# Patient Record
Sex: FEMALE | Race: BLACK OR AFRICAN AMERICAN | Employment: OTHER | URBAN - METROPOLITAN AREA
[De-identification: names, ages, dates, MRNs, and addresses within clinical notes are randomized per-mention and may not be internally consistent; named-entity substitution may affect disease eponyms.]

---

## 2019-11-25 ENCOUNTER — ED HISTORICAL/CONVERTED ENCOUNTER (OUTPATIENT)
Dept: OTHER | Age: 69
End: 2019-11-25

## 2021-05-08 ENCOUNTER — APPOINTMENT (OUTPATIENT)
Dept: GENERAL RADIOLOGY | Age: 71
End: 2021-05-08
Attending: STUDENT IN AN ORGANIZED HEALTH CARE EDUCATION/TRAINING PROGRAM
Payer: COMMERCIAL

## 2021-05-08 ENCOUNTER — HOSPITAL ENCOUNTER (EMERGENCY)
Age: 71
Discharge: HOME | End: 2021-05-08
Attending: STUDENT IN AN ORGANIZED HEALTH CARE EDUCATION/TRAINING PROGRAM
Payer: COMMERCIAL

## 2021-05-08 VITALS
SYSTOLIC BLOOD PRESSURE: 144 MMHG | WEIGHT: 240 LBS | OXYGEN SATURATION: 99 % | BODY MASS INDEX: 39.99 KG/M2 | HEIGHT: 65 IN | DIASTOLIC BLOOD PRESSURE: 50 MMHG | HEART RATE: 59 BPM | TEMPERATURE: 98.5 F | RESPIRATION RATE: 16 BRPM

## 2021-05-08 DIAGNOSIS — U07.1 COVID-19: ICD-10-CM

## 2021-05-08 DIAGNOSIS — R06.02 SOB (SHORTNESS OF BREATH): Primary | ICD-10-CM

## 2021-05-08 LAB
ALBUMIN SERPL-MCNC: 3.2 G/DL (ref 3.5–5)
ALBUMIN/GLOB SERPL: 0.9 {RATIO} (ref 1.1–2.2)
ALP SERPL-CCNC: 101 U/L (ref 45–117)
ALT SERPL-CCNC: 40 U/L (ref 12–78)
ANION GAP SERPL CALC-SCNC: 2 MMOL/L (ref 5–15)
AST SERPL W P-5'-P-CCNC: 16 U/L (ref 15–37)
ATRIAL RATE: 52 BPM
BASOPHILS # BLD: 0 K/UL (ref 0–0.1)
BASOPHILS NFR BLD: 0 % (ref 0–1)
BILIRUB SERPL-MCNC: 0.5 MG/DL (ref 0.2–1)
BNP SERPL-MCNC: 208 PG/ML
BUN SERPL-MCNC: 19 MG/DL (ref 6–20)
BUN/CREAT SERPL: 30 (ref 12–20)
CA-I BLD-MCNC: 9.6 MG/DL (ref 8.5–10.1)
CALCULATED P AXIS, ECG09: 49 DEGREES
CALCULATED R AXIS, ECG10: -52 DEGREES
CALCULATED T AXIS, ECG11: 103 DEGREES
CHLORIDE SERPL-SCNC: 104 MMOL/L (ref 97–108)
CO2 SERPL-SCNC: 33 MMOL/L (ref 21–32)
CREAT SERPL-MCNC: 0.63 MG/DL (ref 0.55–1.02)
D DIMER PPP FEU-MCNC: <0.27 UG/ML(FEU)
DIAGNOSIS, 93000: NORMAL
DIFFERENTIAL METHOD BLD: ABNORMAL
EOSINOPHIL # BLD: 0.2 K/UL (ref 0–0.4)
EOSINOPHIL NFR BLD: 2 % (ref 0–7)
ERYTHROCYTE [DISTWIDTH] IN BLOOD BY AUTOMATED COUNT: 12.5 % (ref 11.5–14.5)
GLOBULIN SER CALC-MCNC: 3.5 G/DL (ref 2–4)
GLUCOSE SERPL-MCNC: 114 MG/DL (ref 65–100)
HCT VFR BLD AUTO: 43.7 % (ref 35–47)
HGB BLD-MCNC: 13.9 G/DL (ref 11.5–16)
IMM GRANULOCYTES # BLD AUTO: 0 K/UL (ref 0–0.04)
IMM GRANULOCYTES NFR BLD AUTO: 0 % (ref 0–0.5)
LACTATE SERPL-SCNC: 1 MMOL/L (ref 0.4–2)
LDH SERPL L TO P-CCNC: 169 U/L (ref 81–246)
LYMPHOCYTES # BLD: 4.2 K/UL (ref 0.8–3.5)
LYMPHOCYTES NFR BLD: 48 % (ref 12–49)
MAGNESIUM SERPL-MCNC: 1.9 MG/DL (ref 1.6–2.4)
MCH RBC QN AUTO: 27.8 PG (ref 26–34)
MCHC RBC AUTO-ENTMCNC: 31.8 G/DL (ref 30–36.5)
MCV RBC AUTO: 87.4 FL (ref 80–99)
MONOCYTES # BLD: 1 K/UL (ref 0–1)
MONOCYTES NFR BLD: 12 % (ref 5–13)
NEUTS SEG # BLD: 3.2 K/UL (ref 1.8–8)
NEUTS SEG NFR BLD: 38 % (ref 32–75)
NRBC # BLD: 0 K/UL (ref 0–0.01)
NRBC BLD-RTO: 0 PER 100 WBC
P-R INTERVAL, ECG05: 170 MS
PLATELET # BLD AUTO: 276 K/UL (ref 150–400)
PMV BLD AUTO: 10.4 FL (ref 8.9–12.9)
POTASSIUM SERPL-SCNC: 3.4 MMOL/L (ref 3.5–5.1)
PROCALCITONIN SERPL-MCNC: <0.05 NG/ML
PROT SERPL-MCNC: 6.7 G/DL (ref 6.4–8.2)
Q-T INTERVAL, ECG07: 448 MS
QRS DURATION, ECG06: 118 MS
QTC CALCULATION (BEZET), ECG08: 416 MS
RBC # BLD AUTO: 5 M/UL (ref 3.8–5.2)
SODIUM SERPL-SCNC: 139 MMOL/L (ref 136–145)
TROPONIN I SERPL-MCNC: <0.05 NG/ML
VENTRICULAR RATE, ECG03: 52 BPM
WBC # BLD AUTO: 8.6 K/UL (ref 3.6–11)

## 2021-05-08 PROCEDURE — 83615 LACTATE (LD) (LDH) ENZYME: CPT

## 2021-05-08 PROCEDURE — 99285 EMERGENCY DEPT VISIT HI MDM: CPT

## 2021-05-08 PROCEDURE — 84484 ASSAY OF TROPONIN QUANT: CPT

## 2021-05-08 PROCEDURE — 71045 X-RAY EXAM CHEST 1 VIEW: CPT

## 2021-05-08 PROCEDURE — 83880 ASSAY OF NATRIURETIC PEPTIDE: CPT

## 2021-05-08 PROCEDURE — 82728 ASSAY OF FERRITIN: CPT

## 2021-05-08 PROCEDURE — 83605 ASSAY OF LACTIC ACID: CPT

## 2021-05-08 PROCEDURE — 80053 COMPREHEN METABOLIC PANEL: CPT

## 2021-05-08 PROCEDURE — 36415 COLL VENOUS BLD VENIPUNCTURE: CPT

## 2021-05-08 PROCEDURE — 93005 ELECTROCARDIOGRAM TRACING: CPT

## 2021-05-08 PROCEDURE — 74011250636 HC RX REV CODE- 250/636: Performed by: STUDENT IN AN ORGANIZED HEALTH CARE EDUCATION/TRAINING PROGRAM

## 2021-05-08 PROCEDURE — 85025 COMPLETE CBC W/AUTO DIFF WBC: CPT

## 2021-05-08 PROCEDURE — 84145 PROCALCITONIN (PCT): CPT

## 2021-05-08 PROCEDURE — 96374 THER/PROPH/DIAG INJ IV PUSH: CPT

## 2021-05-08 PROCEDURE — 83735 ASSAY OF MAGNESIUM: CPT

## 2021-05-08 PROCEDURE — 85379 FIBRIN DEGRADATION QUANT: CPT

## 2021-05-08 RX ORDER — DEXAMETHASONE SODIUM PHOSPHATE 10 MG/ML
10 INJECTION INTRAMUSCULAR; INTRAVENOUS ONCE
Status: COMPLETED | OUTPATIENT
Start: 2021-05-08 | End: 2021-05-08

## 2021-05-08 RX ORDER — ALBUTEROL SULFATE 90 UG/1
2 AEROSOL, METERED RESPIRATORY (INHALATION)
Qty: 1 INHALER | Refills: 1 | Status: SHIPPED | OUTPATIENT
Start: 2021-05-08 | End: 2021-10-11

## 2021-05-08 RX ORDER — ALBUTEROL SULFATE 90 UG/1
2 AEROSOL, METERED RESPIRATORY (INHALATION)
Qty: 1 INHALER | Refills: 1 | Status: SHIPPED | OUTPATIENT
Start: 2021-05-08 | End: 2021-05-08 | Stop reason: SDUPTHER

## 2021-05-08 RX ADMIN — DEXAMETHASONE SODIUM PHOSPHATE 10 MG: 10 INJECTION, SOLUTION INTRAMUSCULAR; INTRAVENOUS at 17:16

## 2021-05-08 NOTE — ED PROVIDER NOTES
EMERGENCY DEPARTMENT HISTORY AND PHYSICAL EXAM      Date: 5/8/2021  Patient Name: Nya Stern    History of Presenting Illness     Chief Complaint   Patient presents with    Shortness of Breath       History Provided By: Patient    HPI: Nya Stern, 66 y.o. female with a past medical history significant diabetes, hypertension and Recent Covid diagnosis presents to the ED with cc of shortness of breath. Patient was diagnosed with COVID-19 approximately 10 days ago, states she has been self isolating at home, has noticed worsening chest pressure, shortness of breath over the last 2 to 3 days. Patient was given prednisone to go home with, completed course, over the last 2 days noticed shortness of breath even worse. Denies any pains, denies any fevers chills, denies any abdominal pain, nausea vomiting. There are no other complaints, changes, or physical findings at this time. PCP: None    Current Outpatient Medications   Medication Sig Dispense Refill    albuterol (PROVENTIL HFA, VENTOLIN HFA, PROAIR HFA) 90 mcg/actuation inhaler Take 2 Puffs by inhalation every four (4) hours as needed for Wheezing. 1 Inhaler 1       Past History     Past Medical History:  Past Medical History:   Diagnosis Date    Diabetes ([facility] [address]     High cholesterol     Hypertension        Past Surgical History:  Past Surgical History:   Procedure Laterality Date    HX HEMORRHOIDECTOMY      HX TONSILLECTOMY      HX WISDOM TEETH EXTRACTION         Family History:  No family history on file. Social History:  Social History     Tobacco Use    Smoking status: Never Smoker   Substance Use Topics    Alcohol use: Never     Frequency: Never    Drug use: Never       Allergies: Allergies   Allergen Reactions    Doxycycline Unknown (comments)     HIVES    Metoprolol Unknown (comments)     HIVES         Review of Systems     Review of Systems   Constitutional: Negative for activity change, appetite change, chills and fever. HENT: Negative for ear pain, rhinorrhea and sore throat. Eyes: Negative for photophobia and visual disturbance. Respiratory: Positive for cough, chest tightness and shortness of breath. Cardiovascular: Negative for chest pain and palpitations. Gastrointestinal: Negative for abdominal pain and nausea. Genitourinary: Negative for dysuria and hematuria. Musculoskeletal: Negative for arthralgias and myalgias. Skin: Negative for color change and pallor. Neurological: Negative for dizziness, weakness, numbness and headaches. Psychiatric/Behavioral: Negative for confusion. Physical Exam     Physical Exam  Vitals signs and nursing note reviewed. Constitutional:       General: She is not in acute distress. Appearance: Normal appearance. She is normal weight. She is not ill-appearing. HENT:      Head: Normocephalic and atraumatic. Nose: Nose normal.      Mouth/Throat:      Mouth: Mucous membranes are moist.   Eyes:      Extraocular Movements: Extraocular movements intact. Pupils: Pupils are equal, round, and reactive to light. Neck:      Musculoskeletal: Normal range of motion and neck supple. No muscular tenderness. Cardiovascular:      Rate and Rhythm: Normal rate and regular rhythm. Pulses: Normal pulses. Pulmonary:      Effort: Tachypnea and accessory muscle usage present. Breath sounds: Normal breath sounds. No stridor. Chest:      Chest wall: No tenderness or edema. Abdominal:      General: Abdomen is flat. Bowel sounds are normal.      Palpations: Abdomen is soft. Tenderness: There is no abdominal tenderness. There is no guarding. Musculoskeletal: Normal range of motion. General: No tenderness. Skin:     General: Skin is warm and dry. Neurological:      General: No focal deficit present. Mental Status: She is alert and oriented to person, place, and time. Sensory: No sensory deficit. Motor: No weakness. Diagnostic Study Results     Labs -     Recent Results (from the past 12 hour(s))   CBC WITH AUTOMATED DIFF    Collection Time: 05/08/21  4:00 PM   Result Value Ref Range    WBC 8.6 3.6 - 11.0 K/uL    RBC 5.00 3.80 - 5.20 M/uL    HGB 13.9 11.5 - 16.0 g/dL    HCT 43.7 35.0 - 47.0 %    MCV 87.4 80.0 - 99.0 FL    MCH 27.8 26.0 - 34.0 PG    MCHC 31.8 30.0 - 36.5 g/dL    RDW 12.5 11.5 - 14.5 %    PLATELET 990 472 - 703 K/uL    MPV 10.4 8.9 - 12.9 FL    NRBC 0.0 0.0  WBC    ABSOLUTE NRBC 0.00 0.00 - 0.01 K/uL    NEUTROPHILS 38 32 - 75 %    LYMPHOCYTES 48 12 - 49 %    MONOCYTES 12 5 - 13 %    EOSINOPHILS 2 0 - 7 %    BASOPHILS 0 0 - 1 %    IMMATURE GRANULOCYTES 0 0 - 0.5 %    ABS. NEUTROPHILS 3.2 1.8 - 8.0 K/UL    ABS. LYMPHOCYTES 4.2 (H) 0.8 - 3.5 K/UL    ABS. MONOCYTES 1.0 0.0 - 1.0 K/UL    ABS. EOSINOPHILS 0.2 0.0 - 0.4 K/UL    ABS. BASOPHILS 0.0 0.0 - 0.1 K/UL    ABS. IMM. GRANS. 0.0 0.00 - 0.04 K/UL    DF AUTOMATED     METABOLIC PANEL, COMPREHENSIVE    Collection Time: 05/08/21  4:00 PM   Result Value Ref Range    Sodium 139 136 - 145 mmol/L    Potassium 3.4 (L) 3.5 - 5.1 mmol/L    Chloride 104 97 - 108 mmol/L    CO2 33 (H) 21 - 32 mmol/L    Anion gap 2 (L) 5 - 15 mmol/L    Glucose 114 (H) 65 - 100 mg/dL    BUN 19 6 - 20 mg/dL    Creatinine 0.63 0.55 - 1.02 mg/dL    BUN/Creatinine ratio 30 (H) 12 - 20      GFR est AA >60 >60 ml/min/1.73m2    GFR est non-AA >60 >60 ml/min/1.73m2    Calcium 9.6 8.5 - 10.1 mg/dL    Bilirubin, total 0.5 0.2 - 1.0 mg/dL    AST (SGOT) 16 15 - 37 U/L    ALT (SGPT) 40 12 - 78 U/L    Alk.  phosphatase 101 45 - 117 U/L    Protein, total 6.7 6.4 - 8.2 g/dL    Albumin 3.2 (L) 3.5 - 5.0 g/dL    Globulin 3.5 2.0 - 4.0 g/dL    A-G Ratio 0.9 (L) 1.1 - 2.2     TROPONIN I    Collection Time: 05/08/21  4:00 PM   Result Value Ref Range    Troponin-I, Qt. <0.05 <0.05 ng/mL   BNP    Collection Time: 05/08/21  4:00 PM   Result Value Ref Range    NT pro- (H) <125 pg/mL   MAGNESIUM Collection Time: 05/08/21  4:00 PM   Result Value Ref Range    Magnesium 1.9 1.6 - 2.4 mg/dL   LD    Collection Time: 05/08/21  4:00 PM   Result Value Ref Range     81 - 246 U/L   D DIMER    Collection Time: 05/08/21  4:00 PM   Result Value Ref Range    D DIMER <0.27 <0.50 ug/ml(FEU)   PROCALCITONIN    Collection Time: 05/08/21  4:00 PM   Result Value Ref Range    Procalcitonin <0.05 (H) 0 ng/mL   LACTIC ACID    Collection Time: 05/08/21  4:00 PM   Result Value Ref Range    Lactic acid 1.0 0.4 - 2.0 mmol/L   EKG, 12 LEAD, INITIAL    Collection Time: 05/08/21  5:22 PM   Result Value Ref Range    Ventricular Rate 52 BPM    Atrial Rate 52 BPM    P-R Interval 170 ms    QRS Duration 118 ms    Q-T Interval 448 ms    QTC Calculation (Bezet) 416 ms    Calculated P Axis 49 degrees    Calculated R Axis -52 degrees    Calculated T Axis 103 degrees    Diagnosis       Sinus bradycardia with Premature atrial complexes  Left anterior fascicular block  Left ventricular hypertrophy with QRS widening and repolarization abnormality  Abnormal ECG  No previous ECGs available  Confirmed by DOMENICO RAMOS ([id number]) on 5/8/2021 6:47:28 PM         Radiologic Studies -   [unfilled]  CT Results  (Last 48 hours)    None        CXR Results  (Last 48 hours)               05/08/21 1609  XR CHEST SNGL V Final result    Narrative:  Chest single view. A single view of the chest is obtained. Lung volumes are within normal limits. The peripheral lungs are clear. Cardiac and mediastinal structures are within   normal limits. There is no pneumothorax or pleural effusion. Medical Decision Making and ED Course   I am the first provider for this patient. I reviewed the vital signs, available nursing notes, past medical history, past surgical history, family history and social history. Vital Signs-Reviewed the patient's vital signs.   Patient Vitals for the past 12 hrs:   Temp Pulse Resp BP SpO2   05/08/21 1755  (!) 59 16 (!) 144/50 99 %   05/08/21 1736     100 %   05/08/21 1735  61 18 (!) 155/81 100 %   05/08/21 1554 98.5 °F (36.9 °C) 67 18 (!) 163/148 99 %       EKG interpretation: (Preliminary)  Sinus bradycardia 52, no ST elevations, T wave inversions in lateral leads    Records Reviewed: Nursing Notes    The patient presents with cough, shortness of breath with a differential diagnosis of COVID-19 pneumonia, pleural effusion, ACS, NSTEMI      Provider Notes (Medical Decision Making):     MDM   67-year-old female, history of diabetes, hypertension, presents to emergency department for 2 days of worsening shortness of breath, patient was diagnosed with COVID-19 approximately 10 days ago. Physical exam shows stable vitals,  patient tachypneic, complaining of significant shortness of breath however breath sounds are clear, saturations 99% on room air, clear oropharynx, no stridor no signs of obstruction. Patient placed on 2 L oxygen for symptomatic relief, states that symptoms much improved after oxygen. Chest x-ray obtained, resulted by radiologist no signs of acute infiltrate, effusion, no obvious airway obstruction noted,    Basic lab work drawn including cardiac enzymes, BNP, D-dimer    will administer 10 mg Decadron IV. ED Course:   Initial assessment performed. The patients presenting problems have been discussed, and they are in agreement with the care plan formulated and outlined with them. I have encouraged them to ask questions as they arise throughout their visit. ED Course as of May 08 2234   Sat May 08, 2021   1759 Patient's lab work reviewed, D-dimer negative, troponin negative, BMP within normal limits, chest x-ray shows no effusion infiltrate, patient continues to saturate 99 -100% on room air.     [PZ]   5774 Patient reassessed, comfortable appearing, discussed results with patient and daughter over the phone, will discharge patient home with prescription for albuterol pump, pulse ox reading, instructed on use, instructed to follow-up with primary care physician 1 to 2 days as needed, return precautions discussed. [PZ]      ED Course User Index  [PZ] Starr Yin MD         Procedures       Tate Drake MD  Procedures         Disposition       Discharged    Remove if not discharged  DISCHARGE PLAN:  1. There are no discharge medications for this patient. 2.   Follow-up Information     Follow up With Specialties Details Why Contact Info    Your primary care physician            3. Return to ED if worse     Diagnosis     Clinical Impression:   1. SOB (shortness of breath)    2. COVID-19        Attestations:    Tate Drake MD    Please note that this dictation was completed with [email or web address], the computer voice recognition software. Quite often unanticipated grammatical, syntax, homophones, and other interpretive errors are inadvertently transcribed by the computer software. Please disregard these errors. Please excuse any errors that have escaped final proofreading. Thank you.

## 2021-05-09 ENCOUNTER — PATIENT OUTREACH (OUTPATIENT)
Dept: CASE MANAGEMENT | Age: 71
End: 2021-05-09

## 2021-05-09 NOTE — PROGRESS NOTES
Patient contacted regarding TOGNS-11 diagnosis\". Discussed COVID-19 related testing which was not done at this time. Test results were not done. Patient informed of results, if available? N/a. Tested positive 11 days outside of health system. LPN Care Coordinator contacted the patient by telephone to perform post discharge assessment. Call within 2 business days of discharge: Yes Verified name and  with patient as identifiers. Provided introduction to self, and explanation of the CTN/ACM role, and reason for call due to risk factors for infection and/or exposure to COVID-19. Symptoms reviewed with patient who verbalized the following symptoms: no worsening symptoms      Due to no new or worsening symptoms encounter was not routed to provider for escalation. Discussed follow-up appointments. If no appointment was previously scheduled, appointment scheduling offered:  [facility] follow up appointment(s): No future appointments. Non-[facility] follow up appointment(s): Plans to follow-up with primary care provider. Advance Care Planning:   Does patient have an Advance Directive:  decision maker updated. Patient has following risk factors of: diabetes. LPN CC reviewed discharge instructions, medical action plan and red flags such as increased shortness of breath, increasing fever and signs of decompensation with patient who verbalized understanding. Discussed exposure protocols and quarantine with CDC Guidelines What to do if you are sick with coronavirus disease 2019.  Patient was given an opportunity for questions and concerns. The patient agrees to contact the [facility] exposure line [phone], [facility] [address]  ([phone] and PCP office for questions related to their healthcare. LPN CC provided contact information for future needs.     Reviewed and educated patient on any new and changed medications related to discharge diagnosis     Was patient discharged with a pulse oximeter? Pt states not discharged with pulse ox. Discussed and confirmed pulse oximeter discharge instructions and when to notify provider or seek emergency care. Plan for follow-up call in 5-7 days based on severity of symptoms and risk factors.

## 2021-05-09 NOTE — ACP (ADVANCE CARE PLANNING)
Advance Care Planning   Healthcare Decision Maker:     Carlos Betojama Marleni-daughter    Click here to complete [address] including selection of the Healthcare Decision Maker Relationship (ie \"Primary\")

## 2021-05-10 LAB — FERRITIN SERPL-MCNC: 66 NG/ML (ref 8–252)

## 2021-05-19 ENCOUNTER — PATIENT OUTREACH (OUTPATIENT)
Dept: CASE MANAGEMENT | Age: 71
End: 2021-05-19

## 2021-05-20 NOTE — PROGRESS NOTES
Patient contacted regarding COVID-19 diagnosis. LPN Care Coordinator contacted the patient by telephone to perform follow-up assessment. Verified name and  with patient as identifiers. Patient has following risk factors of: diabetes and HTN. Symptoms reviewed with patient who verbalized the following symptoms: no new symptoms. Due to no new or worsening symptoms encounter was not routed to provider for escalation. LPN CC provided contact information. Plan for follow-up call in 3-5 days based on severity of symptoms and risk factors.

## 2021-05-25 ENCOUNTER — PATIENT OUTREACH (OUTPATIENT)
Dept: CASE MANAGEMENT | Age: 71
End: 2021-05-25

## 2021-10-05 ENCOUNTER — HOSPITAL ENCOUNTER (INPATIENT)
Age: 71
LOS: 6 days | Discharge: HOME HEALTH | DRG: 308 | End: 2021-10-11
Attending: EMERGENCY MEDICINE | Admitting: FAMILY MEDICINE
Payer: MEDICARE

## 2021-10-05 ENCOUNTER — APPOINTMENT (OUTPATIENT)
Dept: CT IMAGING | Age: 71
DRG: 308 | End: 2021-10-05
Attending: FAMILY MEDICINE
Payer: MEDICARE

## 2021-10-05 ENCOUNTER — APPOINTMENT (OUTPATIENT)
Dept: CT IMAGING | Age: 71
DRG: 308 | End: 2021-10-05
Attending: EMERGENCY MEDICINE
Payer: MEDICARE

## 2021-10-05 ENCOUNTER — APPOINTMENT (OUTPATIENT)
Dept: GENERAL RADIOLOGY | Age: 71
DRG: 308 | End: 2021-10-05
Attending: EMERGENCY MEDICINE
Payer: MEDICARE

## 2021-10-05 DIAGNOSIS — R06.03 ACUTE RESPIRATORY DISTRESS: Primary | ICD-10-CM

## 2021-10-05 PROBLEM — R06.02 SOB (SHORTNESS OF BREATH): Status: ACTIVE | Noted: 2021-10-05

## 2021-10-05 PROBLEM — R06.00 DYSPNEA: Status: ACTIVE | Noted: 2021-10-05

## 2021-10-05 LAB
ALBUMIN SERPL-MCNC: 3.2 G/DL (ref 3.5–5)
ALBUMIN/GLOB SERPL: 1 {RATIO} (ref 1.1–2.2)
ALP SERPL-CCNC: 88 U/L (ref 45–117)
ALT SERPL-CCNC: 27 U/L (ref 12–78)
ANION GAP SERPL CALC-SCNC: 2 MMOL/L (ref 5–15)
APPEARANCE UR: CLEAR
AST SERPL W P-5'-P-CCNC: 18 U/L (ref 15–37)
ATRIAL RATE: 73 BPM
BACTERIA URNS QL MICRO: NEGATIVE /HPF
BASOPHILS # BLD: 0 K/UL (ref 0–0.1)
BASOPHILS NFR BLD: 1 % (ref 0–1)
BILIRUB SERPL-MCNC: 0.4 MG/DL (ref 0.2–1)
BILIRUB UR QL: NEGATIVE
BNP SERPL-MCNC: 89 PG/ML
BUN SERPL-MCNC: 12 MG/DL (ref 6–20)
BUN/CREAT SERPL: 30 (ref 12–20)
CA-I BLD-MCNC: 9.8 MG/DL (ref 8.5–10.1)
CALCULATED P AXIS, ECG09: 43 DEGREES
CALCULATED R AXIS, ECG10: -54 DEGREES
CALCULATED T AXIS, ECG11: 111 DEGREES
CHLORIDE SERPL-SCNC: 102 MMOL/L (ref 97–108)
CK SERPL-CCNC: 38.2 NG/ML (ref 26–192)
CO2 SERPL-SCNC: 37 MMOL/L (ref 21–32)
COLOR UR: NORMAL
COVID-19 RAPID TEST, COVR: NOT DETECTED
CREAT SERPL-MCNC: 0.4 MG/DL (ref 0.55–1.02)
DIAGNOSIS, 93000: NORMAL
DIFFERENTIAL METHOD BLD: ABNORMAL
EOSINOPHIL # BLD: 0 K/UL (ref 0–0.4)
EOSINOPHIL NFR BLD: 1 % (ref 0–7)
ERYTHROCYTE [DISTWIDTH] IN BLOOD BY AUTOMATED COUNT: 11.9 % (ref 11.5–14.5)
FLUAV AG NPH QL IA: NEGATIVE
FLUBV AG NOSE QL IA: NEGATIVE
GLOBULIN SER CALC-MCNC: 3.3 G/DL (ref 2–4)
GLUCOSE SERPL-MCNC: 101 MG/DL (ref 65–100)
GLUCOSE UR STRIP.AUTO-MCNC: NEGATIVE MG/DL
HCT VFR BLD AUTO: 39.1 % (ref 35–47)
HGB BLD-MCNC: 12.4 G/DL (ref 11.5–16)
HGB UR QL STRIP: NEGATIVE
IMM GRANULOCYTES # BLD AUTO: 0 K/UL (ref 0–0.04)
IMM GRANULOCYTES NFR BLD AUTO: 0 % (ref 0–0.5)
KETONES UR QL STRIP.AUTO: NEGATIVE MG/DL
LEUKOCYTE ESTERASE UR QL STRIP.AUTO: NEGATIVE
LYMPHOCYTES # BLD: 1.8 K/UL (ref 0.8–3.5)
LYMPHOCYTES NFR BLD: 42 % (ref 12–49)
MCH RBC QN AUTO: 28.6 PG (ref 26–34)
MCHC RBC AUTO-ENTMCNC: 31.7 G/DL (ref 30–36.5)
MCV RBC AUTO: 90.1 FL (ref 80–99)
MONOCYTES # BLD: 0.6 K/UL (ref 0–1)
MONOCYTES NFR BLD: 14 % (ref 5–13)
MUCOUS THREADS URNS QL MICRO: NORMAL /LPF
NEUTS SEG # BLD: 1.8 K/UL (ref 1.8–8)
NEUTS SEG NFR BLD: 42 % (ref 32–75)
NITRITE UR QL STRIP.AUTO: NEGATIVE
NRBC # BLD: 0 K/UL (ref 0–0.01)
NRBC BLD-RTO: 0 PER 100 WBC
P-R INTERVAL, ECG05: 198 MS
PH UR STRIP: 7 [PH] (ref 5–8)
PLATELET # BLD AUTO: 228 K/UL (ref 150–400)
PMV BLD AUTO: 10.5 FL (ref 8.9–12.9)
POTASSIUM SERPL-SCNC: 3.4 MMOL/L (ref 3.5–5.1)
PROT SERPL-MCNC: 6.5 G/DL (ref 6.4–8.2)
PROT UR STRIP-MCNC: NEGATIVE MG/DL
Q-T INTERVAL, ECG07: 414 MS
QRS DURATION, ECG06: 108 MS
QTC CALCULATION (BEZET), ECG08: 456 MS
RBC # BLD AUTO: 4.34 M/UL (ref 3.8–5.2)
RBC #/AREA URNS HPF: NORMAL /HPF (ref 0–5)
SODIUM SERPL-SCNC: 141 MMOL/L (ref 136–145)
SP GR UR REFRACTOMETRY: 1.01 (ref 1–1.03)
SPECIMEN SOURCE: NORMAL
TROPONIN I SERPL-MCNC: <0.05 NG/ML
TROPONIN I SERPL-MCNC: <0.05 NG/ML
UA: UC IF INDICATED,UAUC: NORMAL
UROBILINOGEN UR QL STRIP.AUTO: 0.1 EU/DL (ref 0.1–1)
VENTRICULAR RATE, ECG03: 73 BPM
WBC # BLD AUTO: 4.2 K/UL (ref 3.6–11)
WBC URNS QL MICRO: NORMAL /HPF (ref 0–4)

## 2021-10-05 PROCEDURE — 74011250637 HC RX REV CODE- 250/637: Performed by: FAMILY MEDICINE

## 2021-10-05 PROCEDURE — 87804 INFLUENZA ASSAY W/OPTIC: CPT

## 2021-10-05 PROCEDURE — 87040 BLOOD CULTURE FOR BACTERIA: CPT

## 2021-10-05 PROCEDURE — 99284 EMERGENCY DEPT VISIT MOD MDM: CPT

## 2021-10-05 PROCEDURE — 94760 N-INVAS EAR/PLS OXIMETRY 1: CPT

## 2021-10-05 PROCEDURE — 74011250637 HC RX REV CODE- 250/637: Performed by: EMERGENCY MEDICINE

## 2021-10-05 PROCEDURE — 71275 CT ANGIOGRAPHY CHEST: CPT

## 2021-10-05 PROCEDURE — 80053 COMPREHEN METABOLIC PANEL: CPT

## 2021-10-05 PROCEDURE — 74011250636 HC RX REV CODE- 250/636: Performed by: FAMILY MEDICINE

## 2021-10-05 PROCEDURE — 74011000250 HC RX REV CODE- 250: Performed by: FAMILY MEDICINE

## 2021-10-05 PROCEDURE — 94640 AIRWAY INHALATION TREATMENT: CPT

## 2021-10-05 PROCEDURE — 81001 URINALYSIS AUTO W/SCOPE: CPT

## 2021-10-05 PROCEDURE — 85025 COMPLETE CBC W/AUTO DIFF WBC: CPT

## 2021-10-05 PROCEDURE — 82550 ASSAY OF CK (CPK): CPT

## 2021-10-05 PROCEDURE — 65270000029 HC RM PRIVATE

## 2021-10-05 PROCEDURE — 87635 SARS-COV-2 COVID-19 AMP PRB: CPT

## 2021-10-05 PROCEDURE — 93005 ELECTROCARDIOGRAM TRACING: CPT

## 2021-10-05 PROCEDURE — 83880 ASSAY OF NATRIURETIC PEPTIDE: CPT

## 2021-10-05 PROCEDURE — 83036 HEMOGLOBIN GLYCOSYLATED A1C: CPT

## 2021-10-05 PROCEDURE — 84484 ASSAY OF TROPONIN QUANT: CPT

## 2021-10-05 PROCEDURE — 74011000636 HC RX REV CODE- 636: Performed by: FAMILY MEDICINE

## 2021-10-05 PROCEDURE — 71045 X-RAY EXAM CHEST 1 VIEW: CPT

## 2021-10-05 RX ORDER — POLYETHYLENE GLYCOL 3350 17 G/17G
17 POWDER, FOR SOLUTION ORAL DAILY PRN
Status: DISCONTINUED | OUTPATIENT
Start: 2021-10-05 | End: 2021-10-11 | Stop reason: HOSPADM

## 2021-10-05 RX ORDER — AMLODIPINE BESYLATE 10 MG/1
TABLET ORAL
COMMUNITY

## 2021-10-05 RX ORDER — GLIPIZIDE 5 MG/1
TABLET, FILM COATED, EXTENDED RELEASE ORAL
COMMUNITY

## 2021-10-05 RX ORDER — ACETAMINOPHEN 650 MG/1
650 SUPPOSITORY RECTAL
Status: DISCONTINUED | OUTPATIENT
Start: 2021-10-05 | End: 2021-10-11 | Stop reason: HOSPADM

## 2021-10-05 RX ORDER — ONDANSETRON 4 MG/1
4 TABLET, ORALLY DISINTEGRATING ORAL
Status: DISCONTINUED | OUTPATIENT
Start: 2021-10-05 | End: 2021-10-11 | Stop reason: HOSPADM

## 2021-10-05 RX ORDER — ASPIRIN 325 MG
325 TABLET ORAL
Status: COMPLETED | OUTPATIENT
Start: 2021-10-05 | End: 2021-10-05

## 2021-10-05 RX ORDER — IPRATROPIUM BROMIDE AND ALBUTEROL SULFATE 2.5; .5 MG/3ML; MG/3ML
3 SOLUTION RESPIRATORY (INHALATION)
Status: DISCONTINUED | OUTPATIENT
Start: 2021-10-05 | End: 2021-10-10

## 2021-10-05 RX ORDER — CHLORTHALIDONE 25 MG/1
25 TABLET ORAL DAILY
Status: DISCONTINUED | OUTPATIENT
Start: 2021-10-06 | End: 2021-10-11 | Stop reason: HOSPADM

## 2021-10-05 RX ORDER — LANOLIN ALCOHOL/MO/W.PET/CERES
CREAM (GRAM) TOPICAL
COMMUNITY

## 2021-10-05 RX ORDER — AMLODIPINE BESYLATE 5 MG/1
10 TABLET ORAL DAILY
Status: DISCONTINUED | OUTPATIENT
Start: 2021-10-06 | End: 2021-10-11 | Stop reason: HOSPADM

## 2021-10-05 RX ORDER — CHLORTHALIDONE 25 MG/1
TABLET ORAL
COMMUNITY

## 2021-10-05 RX ORDER — ENOXAPARIN SODIUM 100 MG/ML
40 INJECTION SUBCUTANEOUS DAILY
Status: DISCONTINUED | OUTPATIENT
Start: 2021-10-06 | End: 2021-10-08

## 2021-10-05 RX ORDER — SODIUM CHLORIDE 9 MG/ML
25 INJECTION, SOLUTION INTRAVENOUS CONTINUOUS
Status: DISCONTINUED | OUTPATIENT
Start: 2021-10-05 | End: 2021-10-08

## 2021-10-05 RX ORDER — ONDANSETRON 2 MG/ML
4 INJECTION INTRAMUSCULAR; INTRAVENOUS
Status: DISCONTINUED | OUTPATIENT
Start: 2021-10-05 | End: 2021-10-11 | Stop reason: HOSPADM

## 2021-10-05 RX ORDER — OXYBUTYNIN CHLORIDE 5 MG/1
5 TABLET ORAL DAILY
Status: DISCONTINUED | OUTPATIENT
Start: 2021-10-06 | End: 2021-10-11 | Stop reason: HOSPADM

## 2021-10-05 RX ORDER — ALBUTEROL SULFATE 90 UG/1
AEROSOL, METERED RESPIRATORY (INHALATION)
COMMUNITY
End: 2021-10-11

## 2021-10-05 RX ORDER — OXYBUTYNIN CHLORIDE 5 MG/1
TABLET ORAL
COMMUNITY
Start: 2021-09-23

## 2021-10-05 RX ORDER — LISINOPRIL 40 MG/1
40 TABLET ORAL DAILY
Status: DISCONTINUED | OUTPATIENT
Start: 2021-10-06 | End: 2021-10-11 | Stop reason: HOSPADM

## 2021-10-05 RX ORDER — INSULIN LISPRO 100 [IU]/ML
INJECTION, SOLUTION INTRAVENOUS; SUBCUTANEOUS
Status: DISCONTINUED | OUTPATIENT
Start: 2021-10-06 | End: 2021-10-11 | Stop reason: HOSPADM

## 2021-10-05 RX ORDER — MAGNESIUM SULFATE 100 %
4 CRYSTALS MISCELLANEOUS AS NEEDED
Status: DISCONTINUED | OUTPATIENT
Start: 2021-10-05 | End: 2021-10-11 | Stop reason: HOSPADM

## 2021-10-05 RX ORDER — LANOLIN ALCOHOL/MO/W.PET/CERES
400 CREAM (GRAM) TOPICAL DAILY
Status: DISCONTINUED | OUTPATIENT
Start: 2021-10-06 | End: 2021-10-11 | Stop reason: HOSPADM

## 2021-10-05 RX ORDER — GABAPENTIN 100 MG/1
CAPSULE ORAL
COMMUNITY

## 2021-10-05 RX ORDER — PRAVASTATIN SODIUM 20 MG/1
80 TABLET ORAL DAILY
Status: DISCONTINUED | OUTPATIENT
Start: 2021-10-06 | End: 2021-10-11 | Stop reason: HOSPADM

## 2021-10-05 RX ORDER — GABAPENTIN 100 MG/1
100 CAPSULE ORAL
Status: DISCONTINUED | OUTPATIENT
Start: 2021-10-05 | End: 2021-10-11 | Stop reason: HOSPADM

## 2021-10-05 RX ORDER — FAMOTIDINE 20 MG/1
40 TABLET, FILM COATED ORAL 2 TIMES DAILY
Status: DISCONTINUED | OUTPATIENT
Start: 2021-10-05 | End: 2021-10-11 | Stop reason: HOSPADM

## 2021-10-05 RX ORDER — DEXTROSE 50 % IN WATER (D50W) INTRAVENOUS SYRINGE
25-50 AS NEEDED
Status: DISCONTINUED | OUTPATIENT
Start: 2021-10-05 | End: 2021-10-11 | Stop reason: HOSPADM

## 2021-10-05 RX ORDER — PRAVASTATIN SODIUM 80 MG/1
TABLET ORAL
COMMUNITY
Start: 2020-12-07

## 2021-10-05 RX ORDER — ACETAMINOPHEN 325 MG/1
650 TABLET ORAL
Status: DISCONTINUED | OUTPATIENT
Start: 2021-10-05 | End: 2021-10-11 | Stop reason: HOSPADM

## 2021-10-05 RX ORDER — RAMIPRIL 10 MG/1
CAPSULE ORAL
COMMUNITY
Start: 2021-08-14

## 2021-10-05 RX ORDER — GLIPIZIDE 5 MG/1
5 TABLET ORAL DAILY
Status: DISCONTINUED | OUTPATIENT
Start: 2021-10-06 | End: 2021-10-11 | Stop reason: HOSPADM

## 2021-10-05 RX ADMIN — SODIUM CHLORIDE 75 ML/HR: 9 INJECTION, SOLUTION INTRAVENOUS at 23:50

## 2021-10-05 RX ADMIN — IPRATROPIUM BROMIDE AND ALBUTEROL SULFATE 3 ML: .5; 2.5 SOLUTION RESPIRATORY (INHALATION) at 19:23

## 2021-10-05 RX ADMIN — FAMOTIDINE 40 MG: 20 TABLET ORAL at 20:09

## 2021-10-05 RX ADMIN — ASPIRIN 325 MG ORAL TABLET 325 MG: 325 PILL ORAL at 16:23

## 2021-10-05 RX ADMIN — GABAPENTIN 100 MG: 100 CAPSULE ORAL at 20:10

## 2021-10-05 RX ADMIN — APIXABAN 5 MG: 5 TABLET, FILM COATED ORAL at 20:10

## 2021-10-05 RX ADMIN — METHYLPREDNISOLONE SODIUM SUCCINATE 40 MG: 40 INJECTION, POWDER, FOR SOLUTION INTRAMUSCULAR; INTRAVENOUS at 18:03

## 2021-10-05 RX ADMIN — IOPAMIDOL 100 ML: 755 INJECTION, SOLUTION INTRAVENOUS at 19:00

## 2021-10-05 RX ADMIN — METHYLPREDNISOLONE SODIUM SUCCINATE 40 MG: 40 INJECTION, POWDER, FOR SOLUTION INTRAMUSCULAR; INTRAVENOUS at 23:53

## 2021-10-05 NOTE — H&P
History and Physical    NAME: Raman Garibay   :  1950   MRN:  [id number]     Date/Time:  10/5/2021 6:08 PM    Patient PCP: Aidee Reynolds MD  ______________________________________________________________________             Subjective:     CHIEF COMPLAINT:     Shortness of breath    HISTORY OF PRESENT ILLNESS:       Patient is a 66y.o. year old female past medical history of diabetes hypertension hyperlipidemia chronic A. fib came to emergency room because of shortness of breath and dyspnea according the patient patient have Covid 19 in April since then patient feeling weak tired seen in the ER 3 times but recently getting more worse came to the emergency room seen by the ER physician  And recommend the patient to be admitted for further evaluation treatment    Past Medical History:   Diagnosis Date    Diabetes ([address]     High cholesterol     Hypertension         Past Surgical History:   Procedure Laterality Date    HX HEMORRHOIDECTOMY      HX TONSILLECTOMY      HX WISDOM TEETH EXTRACTION         Social History     Tobacco Use    Smoking status: Never Smoker   Substance Use Topics    Alcohol use: Never        No family history on file. Allergies   Allergen Reactions    Doxycycline Unknown (comments)     HIVES    Metoprolol Unknown (comments)     HIVES        Prior to Admission medications    Medication Sig Start Date End Date Taking? Authorizing Provider   apixaban (Eliquis) 5 mg tablet Eliquis 5 mg tablet   Take 1 tablet twice a day by oral route.    Yes Other, MD Vicenta   glipiZIDE SR (GLUCOTROL XL) 5 mg CR tablet glipizide ER 5 mg tablet, extended release 24 hr   Yes Other, MD Vicenta   amLODIPine (NORVASC) 10 mg tablet amlodipine 10 mg tablet   Yes Other, MD Vicenta   pravastatin (PRAVACHOL) 80 mg tablet pravastatin 80 mg tablet 20  Yes Other, MD Vicenta   ramipriL (ALTACE) 10 mg capsule  21  Yes Other, MD Vicenta   chlorthalidone (HYGROTON) 25 mg tablet chlorthalidone 25 mg tablet TAKE 1 TABLET BY MOUTH EVERY DAY   Yes Other, MD Vicenta   gabapentin (NEURONTIN) 100 mg capsule gabapentin 100 mg capsule   TAKE 1 CAPSULE AT BEDTIME THEN SLOWLY INCREASE TO 1 CAPSULE 3 TIMES A DAY   Yes Miky, MD Vicenta   magnesium oxide (MAG-OX) 400 mg tablet magnesium oxide 400 mg (241.3 mg magnesium) tablet   Take 1 tablet every day by oral route. Yes Miky, MD Vicenta   oxybutynin (DITROPAN) 5 mg tablet 1 TABLET AT BEDTIME ORALLY ONCE A DAY 10 DAYS 9/23/21  Yes Other, MD Vicenta   albuterol (PROVENTIL HFA, VENTOLIN HFA, PROAIR HFA) 90 mcg/actuation inhaler albuterol sulfate HFA 90 mcg/actuation aerosol inhaler   Yes Other, MD Vicenta   albuterol (PROVENTIL HFA, VENTOLIN HFA, PROAIR HFA) 90 mcg/actuation inhaler Take 2 Puffs by inhalation every four (4) hours as needed for Wheezing.  5/8/21  Yes Pepe Benjamin NP         Current Facility-Administered Medications:     [START ON 10/6/2021] amLODIPine (NORVASC) tablet 10 mg, 10 mg, Oral, DAILY, Quiana Cerna MD    apixaban (ELIQUIS) tablet 5 mg, 5 mg, Oral, BID, Quiana Cerna MD Estell Edouard  [START ON 10/6/2021] chlorthalidone (HYGROTON) tablet 25 mg, 25 mg, Oral, DAILY, Quiana Cerna MD    gabapentin (NEURONTIN) capsule 100 mg, 100 mg, Oral, QHS, Beverly Cerna MD    [START ON 10/6/2021] glipiZIDE (GLUCOTROL) tablet 5 mg, 5 mg, Oral, DAILY, Quiana Cerna MD Estell Edouard  [START ON 10/6/2021] magnesium oxide (MAG-OX) tablet 400 mg, 400 mg, Oral, DAILY, Quiana Cerna MD Estell Edouard  [START ON 10/6/2021] oxybutynin (DITROPAN) tablet 5 mg, 5 mg, Oral, DAILY, Quiana Cerna MD Estell Edouard  [START ON 10/6/2021] pravastatin (PRAVACHOL) tablet 80 mg, 80 mg, Oral, DAILY, Beverly Cerna MD Estell Edouard  [START ON 10/6/2021] lisinopriL (PRINIVIL, ZESTRIL) tablet 40 mg, 40 mg, Oral, DAILY, Beverly Cerna MD    albuterol-ipratropium (DUO-NEB) 2.5 MG-0.5 MG/3 ML, 3 mL, Nebulization, Q6H RT, Beverly Cerna MD    methylPREDNISolone (PF) (SOLU-MEDROL) injection 40 mg, 40 mg, IntraVENous, Q6H, Neeru Cerna MD    Current Outpatient Medications:     apixaban (Eliquis) 5 mg tablet, Eliquis 5 mg tablet  Take 1 tablet twice a day by oral route., Disp: , Rfl:     glipiZIDE SR (GLUCOTROL XL) 5 mg CR tablet, glipizide ER 5 mg tablet, extended release 24 hr, Disp: , Rfl:     amLODIPine (NORVASC) 10 mg tablet, amlodipine 10 mg tablet, Disp: , Rfl:     pravastatin (PRAVACHOL) 80 mg tablet, pravastatin 80 mg tablet, Disp: , Rfl:     ramipriL (ALTACE) 10 mg capsule, , Disp: , Rfl:     chlorthalidone (HYGROTON) 25 mg tablet, chlorthalidone 25 mg tablet  TAKE 1 TABLET BY MOUTH EVERY DAY, Disp: , Rfl:     gabapentin (NEURONTIN) 100 mg capsule, gabapentin 100 mg capsule  TAKE 1 CAPSULE AT BEDTIME THEN SLOWLY INCREASE TO 1 CAPSULE 3 TIMES A DAY, Disp: , Rfl:     magnesium oxide (MAG-OX) 400 mg tablet, magnesium oxide 400 mg (241.3 mg magnesium) tablet  Take 1 tablet every day by oral route., Disp: , Rfl:     oxybutynin (DITROPAN) 5 mg tablet, 1 TABLET AT BEDTIME ORALLY ONCE A DAY 10 DAYS, Disp: , Rfl:     albuterol (PROVENTIL HFA, VENTOLIN HFA, PROAIR HFA) 90 mcg/actuation inhaler, albuterol sulfate HFA 90 mcg/actuation aerosol inhaler, Disp: , Rfl:     albuterol (PROVENTIL HFA, VENTOLIN HFA, PROAIR HFA) 90 mcg/actuation inhaler, Take 2 Puffs by inhalation every four (4) hours as needed for Wheezing., Disp: 1 Inhaler, Rfl: 1    LAB DATA REVIEWED:    Recent Results (from the past 24 hour(s))   EKG, 12 LEAD, INITIAL    Collection Time: 10/05/21  9:59 AM   Result Value Ref Range    Ventricular Rate 73 BPM    Atrial Rate 73 BPM    P-R Interval 198 ms    QRS Duration 108 ms    Q-T Interval 414 ms    QTC Calculation (Bezet) 456 ms    Calculated P Axis 43 degrees    Calculated R Axis -54 degrees    Calculated T Axis 111 degrees    Diagnosis       Normal sinus rhythm  Left anterior fascicular block  Left ventricular hypertrophy with repolarization abnormality  Cannot rule out Septal infarct , age undetermined  Possible Lateral infarct , age undetermined  Abnormal ECG  When compared with ECG of 08-MAY-2021 17:22,  Premature atrial complexes are no longer Present  Confirmed by Navid Tavares ([id number]) on 10/5/2021 5:16:08 PM     COVID-19 RAPID TEST    Collection Time: 10/05/21 10:10 AM   Result Value Ref Range    Specimen source Please find results under separate order      COVID-19 rapid test Not Detected Not Detected     INFLUENZA A & B AG (RAPID TEST)    Collection Time: 10/05/21 10:10 AM   Result Value Ref Range    Influenza A Antigen Negative Negative      Influenza B Antigen Negative Negative     URINALYSIS W/ REFLEX CULTURE    Collection Time: 10/05/21 11:25 AM    Specimen: Urine   Result Value Ref Range    Color Yellow/Straw      Appearance Clear Clear      Specific gravity 1.014 1.003 - 1.030      pH (UA) 7.0 5.0 - 8.0      Protein Negative Negative mg/dL    Glucose Negative Negative mg/dL    Ketone Negative Negative mg/dL    Bilirubin Negative Negative      Blood Negative Negative      Urobilinogen 0.1 0.1 - 1.0 EU/dL    Nitrites Negative Negative      Leukocyte Esterase Negative Negative      UA:UC IF INDICATED Culture not indicated by UA result Culture not indicated by UA result      WBC 0-4 0 - 4 /hpf    RBC 0-5 0 - 5 /hpf    Bacteria Negative Negative /hpf    Mucus Trace /lpf   CBC WITH AUTOMATED DIFF    Collection Time: 10/05/21 11:40 AM   Result Value Ref Range    WBC 4.2 3.6 - 11.0 K/uL    RBC 4.34 3.80 - 5.20 M/uL    HGB 12.4 11.5 - 16.0 g/dL    HCT 39.1 35.0 - 47.0 %    MCV 90.1 80.0 - 99.0 FL    MCH 28.6 26.0 - 34.0 PG    MCHC 31.7 30.0 - 36.5 g/dL    RDW 11.9 11.5 - 14.5 %    PLATELET 378 762 - 232 K/uL    MPV 10.5 8.9 - 12.9 FL    NRBC 0.0 0.0  WBC    ABSOLUTE NRBC 0.00 0.00 - 0.01 K/uL    NEUTROPHILS 42 32 - 75 %    LYMPHOCYTES 42 12 - 49 %    MONOCYTES 14 (H) 5 - 13 %    EOSINOPHILS 1 0 - 7 %    BASOPHILS 1 0 - 1 %    IMMATURE GRANULOCYTES 0 0 - 0.5 %    ABS.  NEUTROPHILS 1.8 1.8 - 8.0 K/UL    ABS. LYMPHOCYTES 1.8 0.8 - 3.5 K/UL    ABS. MONOCYTES 0.6 0.0 - 1.0 K/UL    ABS. EOSINOPHILS 0.0 0.0 - 0.4 K/UL    ABS. BASOPHILS 0.0 0.0 - 0.1 K/UL    ABS. IMM. GRANS. 0.0 0.00 - 0.04 K/UL    DF AUTOMATED     CK W/ REFLX CKMB    Collection Time: 10/05/21 12:45 PM   Result Value Ref Range    CK 38.2 26 - 692 ng/mL   METABOLIC PANEL, COMPREHENSIVE    Collection Time: 10/05/21 12:45 PM   Result Value Ref Range    Sodium 141 136 - 145 mmol/L    Potassium 3.4 (L) 3.5 - 5.1 mmol/L    Chloride 102 97 - 108 mmol/L    CO2 37 (H) 21 - 32 mmol/L    Anion gap 2 (L) 5 - 15 mmol/L    Glucose 101 (H) 65 - 100 mg/dL    BUN 12 6 - 20 mg/dL    Creatinine 0.40 (L) 0.55 - 1.02 mg/dL    BUN/Creatinine ratio 30 (H) 12 - 20      GFR est AA >60 >60 ml/min/1.73m2    GFR est non-AA >60 >60 ml/min/1.73m2    Calcium 9.8 8.5 - 10.1 mg/dL    Bilirubin, total 0.4 0.2 - 1.0 mg/dL    AST (SGOT) 18 15 - 37 U/L    ALT (SGPT) 27 12 - 78 U/L    Alk. phosphatase 88 45 - 117 U/L    Protein, total 6.5 6.4 - 8.2 g/dL    Albumin 3.2 (L) 3.5 - 5.0 g/dL    Globulin 3.3 2.0 - 4.0 g/dL    A-G Ratio 1.0 (L) 1.1 - 2.2     BNP    Collection Time: 10/05/21 12:45 PM   Result Value Ref Range    NT pro-BNP 89 <125 pg/mL   TROPONIN I    Collection Time: 10/05/21 12:45 PM   Result Value Ref Range    Troponin-I, Qt. <0.05 <0.05 ng/mL       XR Results (most recent):  Results from Hospital Encounter encounter on 10/05/21    XR CHEST PORT    Narrative  Chest single view. Comparison single view chest May 8, 2021. Hilar prominence, this may be exaggerated related to reduced lung volumes. Unable to exclude lymphadenopathy. No gross interstitial or alveolar pulmonary edema. No pneumothorax or sizable  pleural effusion.     Consider optimal inspiratory PA and lateral view of the chest or  CECT chest.         XR CHEST PORT   Final Result      CTA CHEST W OR W WO CONT    (Results Pending)   CTA CHEST W OR W WO CONT    (Results Pending)        Review of Systems:  Constitutional: Negative for chills and fever. HENT: Negative. Eyes: Negative. Respiratory: Shortness of breath cardiovascular: Negative. Gastrointestinal: Negative for abdominal pain and nausea. Skin: Negative. Neurological: Negative. Objective:   VITALS:    Visit Vitals  BP (!) 164/74 (BP 1 Location: Left upper arm, BP Patient Position: At rest)   Pulse 74   Temp 98.3 °F (36.8 °C)   Resp 20   Ht 5' 4.5\" (1.638 m)   Wt 107.5 kg (237 lb)   SpO2 100%   BMI 40.05 kg/m²       Physical Exam:   Constitutional: pt is oriented to person, place, and time. HENT:   Head: Normocephalic and atraumatic. Eyes: Pupils are equal, round, and reactive to light. EOM are normal.   Cardiovascular: Normal rate, regular rhythm and normal heart sounds. Pulmonary/Chest: Breath sounds normal. No wheezes. No rales. Exhibits no tenderness. Abdominal: Soft. Bowel sounds are normal. There is no abdominal tenderness. There is no rebound and no guarding. Musculoskeletal: Normal range of motion. Neurological: pt is alert and oriented to person, place, and time. Alert. Normal strength. No cranial nerve deficit or sensory deficit. Displays a negative Romberg sign. ASSESSMENT & PLAN:    Acute dyspnea  History of COVID-19  Type 2 diabetes  Hypertension  Hyperlipidemia  History of DEIDRE Brown      Current Facility-Administered Medications:     [START ON 10/6/2021] amLODIPine (NORVASC) tablet 10 mg, 10 mg, Oral, DAILY, Mohiuddin, Gretel Lundborg, MD    apixaban (ELIQUIS) tablet 5 mg, 5 mg, Oral, BID, Mohiuddin, Gretel Lundborg, MD  [facility]  [START ON 10/6/2021] chlorthalidone (HYGROTON) tablet 25 mg, 25 mg, Oral, DAILY, Beverly Cerna MD    gabapentin (NEURONTIN) capsule 100 mg, 100 mg, Oral, QHS, Beverly Cerna MD    [START ON 10/6/2021] glipiZIDE (GLUCOTROL) tablet 5 mg, 5 mg, Oral, DAILY, Beverly Cerna MD  [facility]  [START ON 10/6/2021] magnesium oxide (MAG-OX) tablet 400 mg, 400 mg, Oral, DAILY, Lieutenant Joce MD  [facility] [START ON 10/6/2021] oxybutynin (DITROPAN) tablet 5 mg, 5 mg, Oral, DAILY, Keith Cerna MD  [facility]  [START ON 10/6/2021] pravastatin (PRAVACHOL) tablet 80 mg, 80 mg, Oral, DAILY, Keith Cerna MD  [facility]  [START ON 10/6/2021] lisinopriL (PRINIVIL, ZESTRIL) tablet 40 mg, 40 mg, Oral, DAILY, Keith Cerna MD    albuterol-ipratropium (DUO-NEB) 2.5 MG-0.5 MG/3 ML, 3 mL, Nebulization, Q6H RT, Keith Cerna MD    methylPREDNISolone (PF) (SOLU-MEDROL) injection 40 mg, 40 mg, IntraVENous, Q6H, Beverly Cerna MD    Current Outpatient Medications:     apixaban (Eliquis) 5 mg tablet, Eliquis 5 mg tablet  Take 1 tablet twice a day by oral route., Disp: , Rfl:     glipiZIDE SR (GLUCOTROL XL) 5 mg CR tablet, glipizide ER 5 mg tablet, extended release 24 hr, Disp: , Rfl:     amLODIPine (NORVASC) 10 mg tablet, amlodipine 10 mg tablet, Disp: , Rfl:     pravastatin (PRAVACHOL) 80 mg tablet, pravastatin 80 mg tablet, Disp: , Rfl:     ramipriL (ALTACE) 10 mg capsule, , Disp: , Rfl:     chlorthalidone (HYGROTON) 25 mg tablet, chlorthalidone 25 mg tablet  TAKE 1 TABLET BY MOUTH EVERY DAY, Disp: , Rfl:     gabapentin (NEURONTIN) 100 mg capsule, gabapentin 100 mg capsule  TAKE 1 CAPSULE AT BEDTIME THEN SLOWLY INCREASE TO 1 CAPSULE 3 TIMES A DAY, Disp: , Rfl:     magnesium oxide (MAG-OX) 400 mg tablet, magnesium oxide 400 mg (241.3 mg magnesium) tablet  Take 1 tablet every day by oral route., Disp: , Rfl:     oxybutynin (DITROPAN) 5 mg tablet, 1 TABLET AT BEDTIME ORALLY ONCE A DAY 10 DAYS, Disp: , Rfl:     albuterol (PROVENTIL HFA, VENTOLIN HFA, PROAIR HFA) 90 mcg/actuation inhaler, albuterol sulfate HFA 90 mcg/actuation aerosol inhaler, Disp: , Rfl:     albuterol (PROVENTIL HFA, VENTOLIN HFA, PROAIR HFA) 90 mcg/actuation inhaler, Take 2 Puffs by inhalation every four (4) hours as needed for Wheezing., Disp: 1 Inhaler, Rfl: 1    Patient admitted to medical telemetry floor order CT of the chest start on nebulizer treatment and IV Solu-Medrol continue other home medication pulmonary consult   PT OT consult ________________________________________________________________________    Signed: Jyoti Berkowitz MD

## 2021-10-05 NOTE — ED TRIAGE NOTES
Patient complained of SOB for a few days. Seen by PCP and given an inhaler with no relief. Patient daughter called EMS for evaluation.  all vitals stable.

## 2021-10-05 NOTE — ACP (ADVANCE CARE PLANNING)
Advance Care Planning   Healthcare Decision Maker:       Primary Decision Maker: Sally Yepez [facility] - [phone]

## 2021-10-05 NOTE — Clinical Note
Status[de-identified] INPATIENT [101]   Type of Bed: Remote Telemetry [29]   Cardiac Monitoring Required?: No   Inpatient Hospitalization Certified Necessary for the Following Reasons: 3.  Patient receiving treatment that can only be provided in an inpatient setting (further clarification in H&P documentation)   Admitting Diagnosis: SOB (shortness of breath) [174355]   Admitting Physician: Mitch Redmond [[id number]]   Attending Physician: Mitch Redmond [[id number]]   Estimated Length of Stay: 2 Midnights   Discharge Plan[de-identified] Home with Office Follow-up

## 2021-10-05 NOTE — PROGRESS NOTES
10/5/21. PCP is STU Liang. D/C Plan is home with daughter Ilana Mcdowell @ [phone]) & daughter will transport home upon discharge. Pt uses cane/no home health.

## 2021-10-05 NOTE — PROGRESS NOTES
Reason for Admission:  Dyspnea                     RUR Score:  8%                   Plan for utilizing home health:  Uses cane/no home health. PCP: First and Last name:  Shanna Mcnulty MD     Name of Practice:    Are you a current patient: Yes/No: Yes   Approximate date of last visit: Seen a few weeks ago. Can you participate in a virtual visit with your PCP: Yes/call. Current Advanced Directive/Advance Care Plan: No Order      Healthcare Decision Maker:                Primary Decision Maker: Sally Yepez - Daughter - [phone]                  Transition of Care Plan:  D/C Plan is home with daughter & daughter will transport home upon discharge.

## 2021-10-06 ENCOUNTER — APPOINTMENT (OUTPATIENT)
Dept: NON INVASIVE DIAGNOSTICS | Age: 71
DRG: 308 | End: 2021-10-06
Attending: FAMILY MEDICINE
Payer: MEDICARE

## 2021-10-06 LAB
ALBUMIN SERPL-MCNC: 3.2 G/DL (ref 3.5–5)
ALBUMIN/GLOB SERPL: 0.9 {RATIO} (ref 1.1–2.2)
ALP SERPL-CCNC: 94 U/L (ref 45–117)
ALT SERPL-CCNC: 29 U/L (ref 12–78)
ANION GAP SERPL CALC-SCNC: 1 MMOL/L (ref 5–15)
AST SERPL W P-5'-P-CCNC: 16 U/L (ref 15–37)
BASOPHILS # BLD: 0 K/UL (ref 0–0.1)
BASOPHILS NFR BLD: 0 % (ref 0–1)
BILIRUB SERPL-MCNC: 0.3 MG/DL (ref 0.2–1)
BNP SERPL-MCNC: 248 PG/ML
BUN SERPL-MCNC: 12 MG/DL (ref 6–20)
BUN/CREAT SERPL: 28 (ref 12–20)
CA-I BLD-MCNC: 10 MG/DL (ref 8.5–10.1)
CHLORIDE SERPL-SCNC: 101 MMOL/L (ref 97–108)
CO2 SERPL-SCNC: 37 MMOL/L (ref 21–32)
CREAT SERPL-MCNC: 0.43 MG/DL (ref 0.55–1.02)
DIFFERENTIAL METHOD BLD: ABNORMAL
ECHO AO ROOT DIAM: 3.1 CM
ECHO AV PEAK GRADIENT: 4 MMHG
ECHO AV PEAK VELOCITY: 106 CM/S
ECHO EST RA PRESSURE: 3 MMHG
ECHO LA MAJOR AXIS: 2.6 CM
ECHO LA MINOR AXIS: 1.23 CM
ECHO LV E' SEPTAL VELOCITY: 5.01 CM/S
ECHO LV EDV A2C: 30.7 CM3
ECHO LV EJECTION FRACTION BIPLANE: 64.7 % (ref 55–100)
ECHO LV ESV A2C: 8.74 CM3
ECHO LV INTERNAL DIMENSION DIASTOLIC: 3.13 CM (ref 3.9–5.3)
ECHO LV INTERNAL DIMENSION SYSTOLIC: 2.06 CM
ECHO LV IVSD: 2.23 CM (ref 0.6–0.9)
ECHO LV MASS 2D: 198.3 G (ref 67–162)
ECHO LV MASS INDEX 2D: 94 G/M2 (ref 43–95)
ECHO LV POSTERIOR WALL DIASTOLIC: 1.09 CM (ref 0.6–0.9)
ECHO LVOT PEAK GRADIENT: 3 MMHG
ECHO LVOT PEAK VELOCITY: 90.7 CM/S
ECHO MV A VELOCITY: 151 CM/S
ECHO MV AREA PHT: 5.5 CM2
ECHO MV E DECELERATION TIME (DT): 136 MS
ECHO MV E VELOCITY: 89 CM/S
ECHO MV E/A RATIO: 0.59
ECHO MV E/E' SEPTAL: 17.76
ECHO MV PRESSURE HALF TIME (PHT): 40 MS
ECHO PV MAX VELOCITY: 116 CM/S
ECHO PV PEAK INSTANTANEOUS GRADIENT SYSTOLIC: 5 MMHG
ECHO RIGHT VENTRICULAR SYSTOLIC PRESSURE (RVSP): 35 MMHG
ECHO RV INTERNAL DIMENSION: 3.05 CM
ECHO TV MEAN GRADIENT: 32 MMHG
ECHO TV REGURGITANT MAX VELOCITY: 282 CM/S
EOSINOPHIL # BLD: 0 K/UL (ref 0–0.4)
EOSINOPHIL NFR BLD: 0 % (ref 0–7)
ERYTHROCYTE [DISTWIDTH] IN BLOOD BY AUTOMATED COUNT: 12 % (ref 11.5–14.5)
EST. AVERAGE GLUCOSE BLD GHB EST-MCNC: 128 MG/DL
GLOBULIN SER CALC-MCNC: 3.6 G/DL (ref 2–4)
GLUCOSE BLD STRIP.AUTO-MCNC: 161 MG/DL (ref 65–117)
GLUCOSE BLD STRIP.AUTO-MCNC: 176 MG/DL (ref 65–117)
GLUCOSE BLD STRIP.AUTO-MCNC: 194 MG/DL (ref 65–117)
GLUCOSE BLD STRIP.AUTO-MCNC: 211 MG/DL (ref 65–117)
GLUCOSE SERPL-MCNC: 207 MG/DL (ref 65–100)
HBA1C MFR BLD: 6.1 % (ref 4–5.6)
HCT VFR BLD AUTO: 40.4 % (ref 35–47)
HGB BLD-MCNC: 12.3 G/DL (ref 11.5–16)
IMM GRANULOCYTES # BLD AUTO: 0 K/UL (ref 0–0.04)
IMM GRANULOCYTES NFR BLD AUTO: 1 % (ref 0–0.5)
LYMPHOCYTES # BLD: 0.5 K/UL (ref 0.8–3.5)
LYMPHOCYTES NFR BLD: 11 % (ref 12–49)
MCH RBC QN AUTO: 27.8 PG (ref 26–34)
MCHC RBC AUTO-ENTMCNC: 30.4 G/DL (ref 30–36.5)
MCV RBC AUTO: 91.2 FL (ref 80–99)
MONOCYTES # BLD: 0 K/UL (ref 0–1)
MONOCYTES NFR BLD: 1 % (ref 5–13)
MV DEC SLOPE: 6480 MM/S2
MV DEC SLOPE: 6480 MM/S2
NEUTS SEG # BLD: 3.6 K/UL (ref 1.8–8)
NEUTS SEG NFR BLD: 87 % (ref 32–75)
NRBC # BLD: 0 K/UL (ref 0–0.01)
NRBC BLD-RTO: 0 PER 100 WBC
PERFORMED BY, TECHID: ABNORMAL
PLATELET # BLD AUTO: 203 K/UL (ref 150–400)
PMV BLD AUTO: 10.8 FL (ref 8.9–12.9)
POTASSIUM SERPL-SCNC: 3.8 MMOL/L (ref 3.5–5.1)
PROT SERPL-MCNC: 6.8 G/DL (ref 6.4–8.2)
RBC # BLD AUTO: 4.43 M/UL (ref 3.8–5.2)
SODIUM SERPL-SCNC: 139 MMOL/L (ref 136–145)
WBC # BLD AUTO: 4.1 K/UL (ref 3.6–11)

## 2021-10-06 PROCEDURE — 93306 TTE W/DOPPLER COMPLETE: CPT

## 2021-10-06 PROCEDURE — 74011250637 HC RX REV CODE- 250/637: Performed by: FAMILY MEDICINE

## 2021-10-06 PROCEDURE — 83880 ASSAY OF NATRIURETIC PEPTIDE: CPT

## 2021-10-06 PROCEDURE — 74011636637 HC RX REV CODE- 636/637: Performed by: FAMILY MEDICINE

## 2021-10-06 PROCEDURE — 94640 AIRWAY INHALATION TREATMENT: CPT

## 2021-10-06 PROCEDURE — 85025 COMPLETE CBC W/AUTO DIFF WBC: CPT

## 2021-10-06 PROCEDURE — 74011000250 HC RX REV CODE- 250: Performed by: FAMILY MEDICINE

## 2021-10-06 PROCEDURE — 74011250636 HC RX REV CODE- 250/636: Performed by: FAMILY MEDICINE

## 2021-10-06 PROCEDURE — 80053 COMPREHEN METABOLIC PANEL: CPT

## 2021-10-06 PROCEDURE — 36415 COLL VENOUS BLD VENIPUNCTURE: CPT

## 2021-10-06 PROCEDURE — 82962 GLUCOSE BLOOD TEST: CPT

## 2021-10-06 PROCEDURE — 77010033678 HC OXYGEN DAILY

## 2021-10-06 PROCEDURE — 65270000029 HC RM PRIVATE

## 2021-10-06 RX ADMIN — Medication 400 MG: at 09:41

## 2021-10-06 RX ADMIN — METHYLPREDNISOLONE SODIUM SUCCINATE 40 MG: 40 INJECTION, POWDER, FOR SOLUTION INTRAMUSCULAR; INTRAVENOUS at 12:24

## 2021-10-06 RX ADMIN — INSULIN LISPRO 4 UNITS: 100 INJECTION, SOLUTION INTRAVENOUS; SUBCUTANEOUS at 12:10

## 2021-10-06 RX ADMIN — FAMOTIDINE 40 MG: 20 TABLET ORAL at 09:40

## 2021-10-06 RX ADMIN — GLIPIZIDE 5 MG: 5 TABLET ORAL at 09:43

## 2021-10-06 RX ADMIN — ENOXAPARIN SODIUM 40 MG: 40 INJECTION SUBCUTANEOUS at 09:46

## 2021-10-06 RX ADMIN — IPRATROPIUM BROMIDE AND ALBUTEROL SULFATE 3 ML: .5; 2.5 SOLUTION RESPIRATORY (INHALATION) at 08:10

## 2021-10-06 RX ADMIN — CHLORTHALIDONE 25 MG: 25 TABLET ORAL at 09:44

## 2021-10-06 RX ADMIN — ACETAMINOPHEN 650 MG: 325 TABLET ORAL at 20:58

## 2021-10-06 RX ADMIN — GABAPENTIN 100 MG: 100 CAPSULE ORAL at 21:00

## 2021-10-06 RX ADMIN — LISINOPRIL 40 MG: 40 TABLET ORAL at 09:43

## 2021-10-06 RX ADMIN — IPRATROPIUM BROMIDE AND ALBUTEROL SULFATE 3 ML: .5; 2.5 SOLUTION RESPIRATORY (INHALATION) at 02:00

## 2021-10-06 RX ADMIN — APIXABAN 5 MG: 5 TABLET, FILM COATED ORAL at 09:41

## 2021-10-06 RX ADMIN — METHYLPREDNISOLONE SODIUM SUCCINATE 40 MG: 40 INJECTION, POWDER, FOR SOLUTION INTRAMUSCULAR; INTRAVENOUS at 05:57

## 2021-10-06 RX ADMIN — OXYBUTYNIN CHLORIDE 5 MG: 5 TABLET ORAL at 09:41

## 2021-10-06 RX ADMIN — PRAVASTATIN SODIUM 80 MG: 20 TABLET ORAL at 09:00

## 2021-10-06 RX ADMIN — IPRATROPIUM BROMIDE AND ALBUTEROL SULFATE 3 ML: .5; 2.5 SOLUTION RESPIRATORY (INHALATION) at 19:23

## 2021-10-06 RX ADMIN — APIXABAN 5 MG: 5 TABLET, FILM COATED ORAL at 20:58

## 2021-10-06 RX ADMIN — METHYLPREDNISOLONE SODIUM SUCCINATE 40 MG: 40 INJECTION, POWDER, FOR SOLUTION INTRAMUSCULAR; INTRAVENOUS at 17:22

## 2021-10-06 RX ADMIN — FAMOTIDINE 40 MG: 20 TABLET ORAL at 20:58

## 2021-10-06 RX ADMIN — INSULIN LISPRO 3 UNITS: 100 INJECTION, SOLUTION INTRAVENOUS; SUBCUTANEOUS at 16:40

## 2021-10-06 NOTE — CONSULTS
PULMONARY CONSULT  [facility]    Name: Mary Kay Raza MRN: [id number]   : 1950 Hospital: [address] [place] [address]   Date: 10/6/2021  Admission date: 10/5/2021 Hospital Day: 2       HPI:     Hospital Problems  Never Reviewed        Codes Class Noted POA    Dyspnea ICD-10-CM: R06.00  ICD-9-CM: 786.09  10/5/2021 Unknown        SOB (shortness of breath) ICD-10-CM: R06.02  ICD-9-CM: 786.05  10/5/2021 Unknown                   [x] High complexity decision making was performed  [x] See my orders for details      Subjective/Initial History:     I was asked by Waylon Caldera MD to see Mary Kay Raza  a 66 y.o.   female in consultation     Excerpts from admission 10/5/2021 or consult notes as follows:     57-year-old lady came in because of shortness of breath and dyspnea significant past medical history of chronic A. fib, diabetes mellitus hypertension hyperlipidemia.   She was diagnosed with COVID-19 pneumonia in April since then patient has not been feeling well she has multiple admissions and ER visits secondary to the above for shortness of breath and dyspnea according to the daughter she is not been feeling well so the past 2 days she is more short of breath so he came to the hospital got admitted and pulmonary consult was called    Allergies   Allergen Reactions    Doxycycline Unknown (comments)     HIVES    Metoprolol Unknown (comments)     HIVES        MAR reviewed and pertinent medications noted or modified as needed     Current Facility-Administered Medications   Medication    amLODIPine (NORVASC) tablet 10 mg    apixaban (ELIQUIS) tablet 5 mg    chlorthalidone (HYGROTON) tablet 25 mg    gabapentin (NEURONTIN) capsule 100 mg    glipiZIDE (GLUCOTROL) tablet 5 mg    magnesium oxide (MAG-OX) tablet 400 mg    oxybutynin (DITROPAN) tablet 5 mg    pravastatin (PRAVACHOL) tablet 80 mg    lisinopriL (PRINIVIL, ZESTRIL) tablet 40 mg    albuterol-ipratropium (DUO-NEB) 2.5 MG-0.5 MG/3 ML    methylPREDNISolone (PF) (SOLU-MEDROL) injection 40 mg    insulin lispro (HUMALOG) injection    glucose chewable tablet 16 g    dextrose (D50W) injection syrg 12.5-25 g    glucagon (GLUCAGEN) injection 1 mg    0.9% sodium chloride infusion    acetaminophen (TYLENOL) tablet 650 mg    Or    acetaminophen (TYLENOL) suppository 650 mg    polyethylene glycol (MIRALAX) packet 17 g    ondansetron (ZOFRAN ODT) tablet 4 mg    Or    ondansetron (ZOFRAN) injection 4 mg    enoxaparin (LOVENOX) injection 40 mg    famotidine (PEPCID) tablet 40 mg     Current Outpatient Medications   Medication Sig    apixaban (Eliquis) 5 mg tablet Eliquis 5 mg tablet   Take 1 tablet twice a day by oral route.  glipiZIDE SR (GLUCOTROL XL) 5 mg CR tablet glipizide ER 5 mg tablet, extended release 24 hr    amLODIPine (NORVASC) 10 mg tablet amlodipine 10 mg tablet    pravastatin (PRAVACHOL) 80 mg tablet pravastatin 80 mg tablet    ramipriL (ALTACE) 10 mg capsule     chlorthalidone (HYGROTON) 25 mg tablet chlorthalidone 25 mg tablet   TAKE 1 TABLET BY MOUTH EVERY DAY    gabapentin (NEURONTIN) 100 mg capsule gabapentin 100 mg capsule   TAKE 1 CAPSULE AT BEDTIME THEN SLOWLY INCREASE TO 1 CAPSULE 3 TIMES A DAY    magnesium oxide (MAG-OX) 400 mg tablet magnesium oxide 400 mg (241.3 mg magnesium) tablet   Take 1 tablet every day by oral route.  oxybutynin (DITROPAN) 5 mg tablet 1 TABLET AT BEDTIME ORALLY ONCE A DAY 10 DAYS    albuterol (PROVENTIL HFA, VENTOLIN HFA, PROAIR HFA) 90 mcg/actuation inhaler albuterol sulfate HFA 90 mcg/actuation aerosol inhaler    albuterol (PROVENTIL HFA, VENTOLIN HFA, PROAIR HFA) 90 mcg/actuation inhaler Take 2 Puffs by inhalation every four (4) hours as needed for Wheezing. Patient PCP: Vita Smith MD  PMH:  has a past medical history of Diabetes ([address] High cholesterol, and Hypertension.   PSH:   has a past surgical history that includes hx hemorrhoidectomy; hx tonsillectomy; and hx wisdom teeth extraction. FHX: family history is not on file. SHX:  reports that she has never smoked. She does not have any smokeless tobacco history on file. She reports that she does not drink alcohol and does not use drugs. ROS:    Review of Systems   Constitutional: Positive for malaise/fatigue. HENT: Negative. Eyes: Negative. Respiratory: Positive for shortness of breath. Cardiovascular: Negative. Gastrointestinal: Negative. Genitourinary: Negative. Musculoskeletal: Negative. Skin: Negative. Neurological: Negative. Psychiatric/Behavioral: Negative. Objective:     Vital Signs: Telemetry atrial fibrillation Intake/Output:   Visit Vitals  BP (!) 140/74   Pulse 89   Temp 97.8 °F (36.6 °C)   Resp 18   Ht 5' 4.5\" (1.638 m)   Wt 107.5 kg (237 lb)   SpO2 100%   BMI 40.05 kg/m²       Temp (24hrs), Av.8 °F (36.6 °C), Min:97.7 °F (36.5 °C), Max:97.8 °F (36.6 °C)        O2 Device: Nasal cannula O2 Flow Rate (L/min): 2 l/min       Wt Readings from Last 4 Encounters:   10/05/21 107.5 kg (237 lb)   21 108.9 kg (240 lb)        No intake or output data in the 24 hours ending 10/06/21 1023    Last shift:      No intake/output data recorded. Last 3 shifts: No intake/output data recorded. Physical Exam:     Physical Exam  Constitutional:       Appearance: She is obese. HENT:      Head: Normocephalic and atraumatic. Nose: Nose normal.      Mouth/Throat:      Mouth: Mucous membranes are moist.   Eyes:      Pupils: Pupils are equal, round, and reactive to light. Cardiovascular:      Rate and Rhythm: Normal rate and regular rhythm. Pulses: Normal pulses. Heart sounds: Normal heart sounds. Pulmonary:      Effort: Pulmonary effort is normal.   Abdominal:      General: Abdomen is flat. Bowel sounds are normal.      Palpations: Abdomen is soft. Musculoskeletal:         General: Normal range of motion. Cervical back: Neck supple.    Skin:     General: Skin is warm. Neurological:      General: No focal deficit present. Mental Status: She is alert. Psychiatric:         Mood and Affect: Mood normal.          Labs:    Recent Labs     10/06/21  0430 10/05/21  1140   WBC 4.1 4.2   HGB 12.3 12.4    228     Recent Labs     10/06/21  0430 10/05/21  1245    141   K 3.8 3.4*    102   CO2 37* 37*   * 101*   BUN 12 12   CREA 0.43* 0.40*   CA 10.0 9.8   ALB 3.2* 3.2*   ALT 29 27     No results for input(s): PH, PCO2, PO2, HCO3, FIO2 in the last 72 hours. Recent Labs     10/05/21  2020 10/05/21  1245   TROIQ <0.05 <0.05     No results found for: BNPP, BNP   No results found for: CULTNo results found for: TSH, TSHEXT    Imaging:    CXR Results  (Last 48 hours)               10/05/21 1050  XR CHEST PORT Final result    Narrative:  Chest single view. Comparison single view chest May 8, 2021. Hilar prominence, this may be exaggerated related to reduced lung volumes. Unable to exclude lymphadenopathy. No gross interstitial or alveolar pulmonary edema. No pneumothorax or sizable   pleural effusion. Consider optimal inspiratory PA and lateral view of the chest or    CECT chest.           Results from Hospital Encounter encounter on 10/05/21    XR CHEST PORT    Narrative  Chest single view. Comparison single view chest May 8, 2021. Hilar prominence, this may be exaggerated related to reduced lung volumes. Unable to exclude lymphadenopathy. No gross interstitial or alveolar pulmonary edema. No pneumothorax or sizable  pleural effusion. Consider optimal inspiratory PA and lateral view of the chest or  CECT chest.      Results from Hospital Encounter encounter on 05/08/21    XR CHEST SNGL V    Narrative  Chest single view. A single view of the chest is obtained. Lung volumes are within normal limits. The peripheral lungs are clear. Cardiac and mediastinal structures are within  normal limits.  There is no pneumothorax or pleural effusion. Results from [place] encounter on 10/05/21    CTA CHEST W OR W WO CONT    Narrative  Shortness of breath. Comparison chest x-ray 10/5/2021. Chest CTA Technique: Axial chest with IV contrast. Multiplanar chest  reformatting and chest MIPs. Dose reduction: All CT scans at this facility are performed using dose reduction  optimization techniques as appropriate to a performed exam including the  following: Automated exposure control, adjustments of the mA and/or kV according  to patient's size, or use of iterative reconstruction technique. FINDINGS: Mild cardiomegaly. No pericardial effusion. Thoracic aorta without  aneurysm or dissection. No large central pulmonary arterial filling defects. Main pulmonary artery is larger than the aorta. No mediastinal or hilar  adenopathy. Thoracic esophagus is collapsed. No pleural effusion. Bilateral  lower lung airspace disease. Included imaging of the upper abdomen demonstrates  dense material in the colon causing streak artifact. No axillary adenopathy. Included thyroid unremarkable. Degenerative changes about the bony structures. Impression  1. No large central pulmonary embolus. Main pulmonary artery is larger than the  aorta suggesting pulmonary arterial hypertension. 2. Cardiomegaly. 3. Bilateral lower lung atelectasis or infiltrates. IMPRESSION:   1. Dyspnea  2. Acute hypoxic respiratory failure  3. Personal history of COVID-19 pneumonia  4. Rule out obstructive sleep apnea  5. Chronic A. Fib  6. Hypertension hyperlipidemia  7. Pulmonary hypertension  8. Pt is requiring Drug therapy requiring intensive monitoring for toxicity  9. Pt is unstable, unpredictable needing inpatient monitoring; is acutely ill and at high risk of sudden decline and decompensation with severe consequenses and continued end organ dysfunction and failure  10. Prognosis guarded       RECOMMENDATIONS/PLAN:     1.  Her dyspnea is multifactorial secondary to underlying chronic A. fib pulmonary hypertension also possible sleep apnea and also she had history of COVID-19 pneumonia  2. CAT scan of the chest negative for pulmonary embolism atelectasis basal no lung mass  3. Will get cardiology consult 2D echo to see the right heart pressure  4. Agree with Solu-Medrol continue with Eliquis  5. Agree with Empiric IV antibiotics pending culture results   6. Follow culture results  7. Supplemental O2 to keep sats > 93%  8. Aspiration precautions  9. Labs to follow electrolytes, renal function and and blood counts  10. Glucose monitoring and SSI  11. Bronchial hygiene with respiratory therapy techniques, bronchodilators  12. DVT, SUP prophylaxis         This care involved high complexity medical decision making: I personally:  · Reviewed the flowsheet and previous days notes  · Reviewed and summarized records or history from previous days note or discussions with staff, family  · High Risk Drug therapy requiring intensive monitoring for toxicity: eg steroids, pressors, antibiotics  · Reviewed and/or ordered Clinical lab tests  · Reviewed images and/or ordered Radiology tests  · Reviewed the patients ECG / Telemetry  · Reviewed and/or adjusted NiPPV settings  · Called and arranged for Radiologic procedures or interventions  · performed or ordered Diagnostic endoscopies with identified risk factors.   · discussed my assessment/management with : Nursing, Hospitalist and Family for coordination of care          Faizan Murray MD

## 2021-10-06 NOTE — CONSULTS
CONSULTATION    REASON FOR CONSULT:  History of atrial fibrillation     REQUESTING PROVIDER:  Dr. Woodrow Abdullahi:    Chief Complaint   Patient presents with    Shortness of Breath         HISTORY OF PRESENT ILLNESS:  April Mercado is a 66y.o. year-old female with past medical history significant for hypertension, hyperlipidemia, and chronic atrial fibrillation who presented to ED for evaluation of atrial fibrillation. On examination patient is sitting upright in the bed. She states that since her COVID diagnosis in April she has experienced generalized weakness and dyspnea with exertion. She states that her atrial fibrillation has been well controlled. She denies palpitations or chest discomfort. EKG: NSR; heart rate 73. Troponin negative x 1, . Records from hospital admission course thus far reviewed. PAST MEDICAL HISTORY:    Past Medical History:   Diagnosis Date    Diabetes ([address]     High cholesterol     Hypertension        PAST SURGICAL HISTORY:   Past Surgical History:   Procedure Laterality Date    HX HEMORRHOIDECTOMY      HX TONSILLECTOMY      HX WISDOM TEETH EXTRACTION         ALLERGIES:    Allergies   Allergen Reactions    Doxycycline Unknown (comments)     HIVES    Metoprolol Unknown (comments)     HIVES       FAMILY HISTORY:  No family history on file. SOCIAL HISTORY:    Tobacco: no  Drugs: no  ETOH: no    HOME MEDICATIONS:    Prior to Admission Medications   Prescriptions Last Dose Informant Patient Reported? Taking? albuterol (PROVENTIL HFA, VENTOLIN HFA, PROAIR HFA) 90 mcg/actuation inhaler 10/5/2021 at Unknown time  No Yes   Sig: Take 2 Puffs by inhalation every four (4) hours as needed for Wheezing.    albuterol (PROVENTIL HFA, VENTOLIN HFA, PROAIR HFA) 90 mcg/actuation inhaler 10/5/2021 at Unknown time  Yes Yes   Sig: albuterol sulfate HFA 90 mcg/actuation aerosol inhaler   amLODIPine (NORVASC) 10 mg tablet 10/5/2021 at Unknown time  Yes Yes Sig: amlodipine 10 mg tablet   apixaban (Eliquis) 5 mg tablet 10/5/2021 at Unknown time  Yes Yes   Sig: Eliquis 5 mg tablet   Take 1 tablet twice a day by oral route. chlorthalidone (HYGROTON) 25 mg tablet 10/5/2021 at Unknown time  Yes Yes   Sig: chlorthalidone 25 mg tablet   TAKE 1 TABLET BY MOUTH EVERY DAY   gabapentin (NEURONTIN) 100 mg capsule 10/5/2021 at Unknown time  Yes Yes   Sig: gabapentin 100 mg capsule   TAKE 1 CAPSULE AT BEDTIME THEN SLOWLY INCREASE TO 1 CAPSULE 3 TIMES A DAY   glipiZIDE SR (GLUCOTROL XL) 5 mg CR tablet 10/5/2021 at Unknown time  Yes Yes   Sig: glipizide ER 5 mg tablet, extended release 24 hr   magnesium oxide (MAG-OX) 400 mg tablet 10/5/2021 at Unknown time  Yes Yes   Sig: magnesium oxide 400 mg (241.3 mg magnesium) tablet   Take 1 tablet every day by oral route. oxybutynin (DITROPAN) 5 mg tablet 10/5/2021 at Unknown time  Yes Yes   Si TABLET AT BEDTIME ORALLY ONCE A DAY 10 DAYS   pravastatin (PRAVACHOL) 80 mg tablet 10/5/2021 at Unknown time  Yes Yes   Sig: pravastatin 80 mg tablet   ramipriL (ALTACE) 10 mg capsule 10/5/2021 at Unknown time  Yes Yes      Facility-Administered Medications: None       REVIEW OF SYSTEMS:  Complete review of systems performed, pertinents noted above, all other systems are negative.     Patient Vitals for the past 24 hrs:   Temp Pulse Resp BP SpO2   10/06/21 1405 97.6 °F (36.4 °C) 95 18 135/68 99 %   10/06/21 1307  83 20 133/75 97 %   10/06/21 1120  (!) 102 25 (!) 162/82    10/06/21 1046  98 24 (!) 149/85 97 %   10/06/21 0900  95 24 (!) 160/81 99 %   10/06/21 0812     100 %   10/06/21 0700  87 19 (!) 147/73 99 %   10/06/21 0448  89 18 (!) 140/74 99 %   10/06/21 0217 97.8 °F (36.6 °C) 90 18 (!) 143/71 98 %   10/06/21 0127     99 %   10/05/21 2349 97.7 °F (36.5 °C) 94 22 (!) 147/70 98 %   10/05/21 2012    (!) 162/59    10/05/21 2011  96 28 (!) 162/61 99 %       PHYSICAL EXAMINATION:    General: Well nourished well developed, NAD, A&O  HEENT: Normocephalic, PERRL, no drainage  Neck: Supple, Trachea midline, No JVD  RESP: CTA bilaterally. + Symmetrical chest movement. No SOB or distress. On O2 per protocol. Cardiovascular: RRR no MRG  PVS: No rubor, cyanosis, no edema  ABD: flat, soft, NT, Normoactive BS  Derm: Warm/Dry/Intact with no lesions,   Neuro: A&O PPTS, No focal deficits  PSYCH: No anxiety or agitation    Recent labs results and imaging reviewed. Recent Results (from the past 24 hour(s))   TROPONIN I    Collection Time: 10/05/21  8:20 PM   Result Value Ref Range    Troponin-I, Qt. <0.05 <0.05 ng/mL   CULTURE, BLOOD    Collection Time: 10/05/21 11:38 PM    Specimen: Blood   Result Value Ref Range    Special Requests: No Special Requests      Culture result: No growth after 4 hours     HEMOGLOBIN A1C WITH EAG    Collection Time: 10/05/21 11:41 PM   Result Value Ref Range    Hemoglobin A1c 6.1 (H) 4.0 - 5.6 %    Est. average glucose 128 mg/dL   CULTURE, BLOOD    Collection Time: 10/05/21 11:41 PM    Specimen: Blood   Result Value Ref Range    Special Requests: No Special Requests      Culture result: No growth after 4 hours     METABOLIC PANEL, COMPREHENSIVE    Collection Time: 10/06/21  4:30 AM   Result Value Ref Range    Sodium 139 136 - 145 mmol/L    Potassium 3.8 3.5 - 5.1 mmol/L    Chloride 101 97 - 108 mmol/L    CO2 37 (H) 21 - 32 mmol/L    Anion gap 1 (L) 5 - 15 mmol/L    Glucose 207 (H) 65 - 100 mg/dL    BUN 12 6 - 20 mg/dL    Creatinine 0.43 (L) 0.55 - 1.02 mg/dL    BUN/Creatinine ratio 28 (H) 12 - 20      GFR est AA >60 >60 ml/min/1.73m2    GFR est non-AA >60 >60 ml/min/1.73m2    Calcium 10.0 8.5 - 10.1 mg/dL    Bilirubin, total 0.3 0.2 - 1.0 mg/dL    AST (SGOT) 16 15 - 37 U/L    ALT (SGPT) 29 12 - 78 U/L    Alk.  phosphatase 94 45 - 117 U/L    Protein, total 6.8 6.4 - 8.2 g/dL    Albumin 3.2 (L) 3.5 - 5.0 g/dL    Globulin 3.6 2.0 - 4.0 g/dL    A-G Ratio 0.9 (L) 1.1 - 2.2     CBC WITH AUTOMATED DIFF    Collection Time: 10/06/21  4:30 AM   Result Value Ref Range    WBC 4.1 3.6 - 11.0 K/uL    RBC 4.43 3.80 - 5.20 M/uL    HGB 12.3 11.5 - 16.0 g/dL    HCT 40.4 35.0 - 47.0 %    MCV 91.2 80.0 - 99.0 FL    MCH 27.8 26.0 - 34.0 PG    MCHC 30.4 30.0 - 36.5 g/dL    RDW 12.0 11.5 - 14.5 %    PLATELET 087 188 - 354 K/uL    MPV 10.8 8.9 - 12.9 FL    NRBC 0.0 0.0  WBC    ABSOLUTE NRBC 0.00 0.00 - 0.01 K/uL    NEUTROPHILS 87 (H) 32 - 75 %    LYMPHOCYTES 11 (L) 12 - 49 %    MONOCYTES 1 (L) 5 - 13 %    EOSINOPHILS 0 0 - 7 %    BASOPHILS 0 0 - 1 %    IMMATURE GRANULOCYTES 1 (H) 0 - 0.5 %    ABS. NEUTROPHILS 3.6 1.8 - 8.0 K/UL    ABS. LYMPHOCYTES 0.5 (L) 0.8 - 3.5 K/UL    ABS. MONOCYTES 0.0 0.0 - 1.0 K/UL    ABS. EOSINOPHILS 0.0 0.0 - 0.4 K/UL    ABS. BASOPHILS 0.0 0.0 - 0.1 K/UL    ABS. IMM. GRANS. 0.0 0.00 - 0.04 K/UL    DF AUTOMATED     GLUCOSE, POC    Collection Time: 10/06/21  8:32 AM   Result Value Ref Range    Glucose (POC) 161 (H) 65 - 117 mg/dL    Performed by Jim Blanca    BNP    Collection Time: 10/06/21 10:43 AM   Result Value Ref Range    NT pro- (H) <125 pg/mL   GLUCOSE, POC    Collection Time: 10/06/21 11:54 AM   Result Value Ref Range    Glucose (POC) 211 (H) 65 - 117 mg/dL    Performed by JAMIE LARA        XR Results (maximum last 3): Results from [place] encounter on 10/05/21    XR CHEST PORT      Results from [place] encounter on 05/08/21    XR CHEST SNGL V      CT Results (maximum last 3): Results from [place] encounter on 10/05/21    CTA CHEST W OR W WO CONT    Impression  1. No large central pulmonary embolus. Main pulmonary artery is larger than the  aorta suggesting pulmonary arterial hypertension. 2. Cardiomegaly. 3. Bilateral lower lung atelectasis or infiltrates.             Current Facility-Administered Medications:     amLODIPine (NORVASC) tablet 10 mg, 10 mg, Oral, DAILY, Beverly Cerna MD    apixaban (ELIQUIS) tablet 5 mg, 5 mg, Oral, BID, Reg Bliss MD, 5 mg at 10/06/21 0941    chlorthalidone (HYGROTON) tablet 25 mg, 25 mg, Oral, DAILY, Reg Bliss MD, 25 mg at 10/06/21 0944    gabapentin (NEURONTIN) capsule 100 mg, 100 mg, Oral, QHS, Beverly Cerna MD, 100 mg at 10/05/21 2010    glipiZIDE (GLUCOTROL) tablet 5 mg, 5 mg, Oral, DAILY, Reg Bliss MD, 5 mg at 10/06/21 0943    magnesium oxide (MAG-OX) tablet 400 mg, 400 mg, Oral, DAILY, Beverly Cerna MD, 400 mg at 10/06/21 0941    oxybutynin (DITROPAN) tablet 5 mg, 5 mg, Oral, DAILY, Reg Bliss MD, 5 mg at 10/06/21 0941    pravastatin (PRAVACHOL) tablet 80 mg, 80 mg, Oral, DAILY, Beverly Cerna MD, 80 mg at 10/06/21 0900    lisinopriL (PRINIVIL, ZESTRIL) tablet 40 mg, 40 mg, Oral, DAILY, Reg Bliss MD, 40 mg at 10/06/21 0943    albuterol-ipratropium (DUO-NEB) 2.5 MG-0.5 MG/3 ML, 3 mL, Nebulization, Q6H RT, Reg Bliss MD, 3 mL at 10/06/21 0810    methylPREDNISolone (PF) (SOLU-MEDROL) injection 40 mg, 40 mg, IntraVENous, Q6H, Beverly Cerna MD, 40 mg at 10/06/21 1224    insulin lispro (HUMALOG) injection, , SubCUTAneous, AC&HS, Reg Bliss MD, 4 Units at 10/06/21 1210    glucose chewable tablet 16 g, 4 Tablet, Oral, PRN, Purvi Cerna MD    dextrose (D50W) injection syrg 12.5-25 g, 25-50 mL, IntraVENous, PRN, Purvi Cerna MD    glucagon (GLUCAGEN) injection 1 mg, 1 mg, IntraMUSCular, PRN, Reg Bliss MD    0.9% sodium chloride infusion, 25 mL/hr, IntraVENous, CONTINUOUS, Beverly Cerna MD, Last Rate: 25 mL/hr at 10/06/21 1125, 25 mL/hr at 10/06/21 1125    acetaminophen (TYLENOL) tablet 650 mg, 650 mg, Oral, Q6H PRN **OR** acetaminophen (TYLENOL) suppository 650 mg, 650 mg, Rectal, Q6H PRN, Beverly Cerna MD    polyethylene glycol (MIRALAX) packet 17 g, 17 g, Oral, DAILY PRN, Beverly Cerna MD    ondansetron (ZOFRAN ODT) tablet 4 mg, 4 mg, Oral, Q8H PRN **OR** ondansetron (ZOFRAN) injection 4 mg, 4 mg, IntraVENous, Q6H PRN, Beverly Cerna MD    enoxaparin (LOVENOX) injection 40 mg, 40 mg, SubCUTAneous, DAILY, Beverly Cerna MD, 40 mg at 10/06/21 0946    famotidine (PEPCID) tablet 40 mg, 40 mg, Oral, BID, Beverly Cerna MD, 40 mg at 10/06/21 0940          Case discussed with collaborating physician Dr. Veverly Essex and our impression and recommendations are as follows:     1. Chronic atrial fibrillation:   - Rate is controlled. Patient is allergic to metoprolol. Will add Cardizem if rate is uncontrolled. Her CHADS2-VASc score is 4 ; continue Eliquis Continue telemetry monitoring. Keep serum potassium between 4-5 and serum magnesium > 2.   - Recommend outpatient appointment to establish care. 2. Hypertension:   - blood pressure is at goal.   - Continue to monitor. 3. Acute hypoxic respiratory failure:  - CTA negative for PE  - pulmonary on the case. Thank you for involving us in the care of this patient. Please do not hesitate to call if additional questions arise. If after hours please call [phone].      Krupa Encinas MD, Reji ANTWAN Noguera  Structural Heart Disease  Endovascular and Vascular Medicine  Interventional Cardiology  [facility] - [facility]  [phone]

## 2021-10-06 NOTE — PROGRESS NOTES
Primary Nurse Cailin Olmos LPN and Blanca López RN performed a dual skin assessment on Ms. Severiano Messer. Patient has no apparent skin issues report. Patient skin is warm, dry, and intact.

## 2021-10-06 NOTE — PROGRESS NOTES
General Daily Progress Note          Patient Name:   Jonathon Gomez       YOB: 1950       Age:  66 y.o. Admit Date: 10/5/2021      Subjective:     Patient is a 66y.o. year old female past medical history of diabetes hypertension hyperlipidemia chronic A. fib came to emergency room because of shortness of breath and dyspnea according the patient patient have Covid 19 in April since then patient feeling weak tired seen in the ER 3 times but recently getting more worse came to the emergency room seen by the ER physician    Patient alert awake denies any chest pain or shortness of breath  Shortness of breath on exertion    CT of the chest done shows    1. No large central pulmonary embolus. Main pulmonary artery is larger than the  aorta suggesting pulmonary arterial hypertension. 2. Cardiomegaly.   3. Bilateral lower lung atelectasis or infiltrates        Objective:     Visit Vitals  BP (!) 140/74   Pulse 89   Temp 97.8 °F (36.6 °C)   Resp 18   Ht 5' 4.5\" (1.638 m)   Wt 107.5 kg (237 lb)   SpO2 100%   BMI 40.05 kg/m²        Recent Results (from the past 24 hour(s))   COVID-19 RAPID TEST    Collection Time: 10/05/21 10:10 AM   Result Value Ref Range    Specimen source Please find results under separate order      COVID-19 rapid test Not Detected Not Detected     INFLUENZA A & B AG (RAPID TEST)    Collection Time: 10/05/21 10:10 AM   Result Value Ref Range    Influenza A Antigen Negative Negative      Influenza B Antigen Negative Negative     URINALYSIS W/ REFLEX CULTURE    Collection Time: 10/05/21 11:25 AM    Specimen: Urine   Result Value Ref Range    Color Yellow/Straw      Appearance Clear Clear      Specific gravity 1.014 1.003 - 1.030      pH (UA) 7.0 5.0 - 8.0      Protein Negative Negative mg/dL    Glucose Negative Negative mg/dL    Ketone Negative Negative mg/dL    Bilirubin Negative Negative      Blood Negative Negative      Urobilinogen 0.1 0.1 - 1.0 EU/dL    Nitrites Negative Negative Leukocyte Esterase Negative Negative      UA:UC IF INDICATED Culture not indicated by UA result Culture not indicated by UA result      WBC 0-4 0 - 4 /hpf    RBC 0-5 0 - 5 /hpf    Bacteria Negative Negative /hpf    Mucus Trace /lpf   CBC WITH AUTOMATED DIFF    Collection Time: 10/05/21 11:40 AM   Result Value Ref Range    WBC 4.2 3.6 - 11.0 K/uL    RBC 4.34 3.80 - 5.20 M/uL    HGB 12.4 11.5 - 16.0 g/dL    HCT 39.1 35.0 - 47.0 %    MCV 90.1 80.0 - 99.0 FL    MCH 28.6 26.0 - 34.0 PG    MCHC 31.7 30.0 - 36.5 g/dL    RDW 11.9 11.5 - 14.5 %    PLATELET 196 579 - 911 K/uL    MPV 10.5 8.9 - 12.9 FL    NRBC 0.0 0.0  WBC    ABSOLUTE NRBC 0.00 0.00 - 0.01 K/uL    NEUTROPHILS 42 32 - 75 %    LYMPHOCYTES 42 12 - 49 %    MONOCYTES 14 (H) 5 - 13 %    EOSINOPHILS 1 0 - 7 %    BASOPHILS 1 0 - 1 %    IMMATURE GRANULOCYTES 0 0 - 0.5 %    ABS. NEUTROPHILS 1.8 1.8 - 8.0 K/UL    ABS. LYMPHOCYTES 1.8 0.8 - 3.5 K/UL    ABS. MONOCYTES 0.6 0.0 - 1.0 K/UL    ABS. EOSINOPHILS 0.0 0.0 - 0.4 K/UL    ABS. BASOPHILS 0.0 0.0 - 0.1 K/UL    ABS. IMM. GRANS. 0.0 0.00 - 0.04 K/UL    DF AUTOMATED     CK W/ REFLX CKMB    Collection Time: 10/05/21 12:45 PM   Result Value Ref Range    CK 38.2 26 - 896 ng/mL   METABOLIC PANEL, COMPREHENSIVE    Collection Time: 10/05/21 12:45 PM   Result Value Ref Range    Sodium 141 136 - 145 mmol/L    Potassium 3.4 (L) 3.5 - 5.1 mmol/L    Chloride 102 97 - 108 mmol/L    CO2 37 (H) 21 - 32 mmol/L    Anion gap 2 (L) 5 - 15 mmol/L    Glucose 101 (H) 65 - 100 mg/dL    BUN 12 6 - 20 mg/dL    Creatinine 0.40 (L) 0.55 - 1.02 mg/dL    BUN/Creatinine ratio 30 (H) 12 - 20      GFR est AA >60 >60 ml/min/1.73m2    GFR est non-AA >60 >60 ml/min/1.73m2    Calcium 9.8 8.5 - 10.1 mg/dL    Bilirubin, total 0.4 0.2 - 1.0 mg/dL    AST (SGOT) 18 15 - 37 U/L    ALT (SGPT) 27 12 - 78 U/L    Alk.  phosphatase 88 45 - 117 U/L    Protein, total 6.5 6.4 - 8.2 g/dL    Albumin 3.2 (L) 3.5 - 5.0 g/dL    Globulin 3.3 2.0 - 4.0 g/dL    A-G Ratio 1.0 (L) 1.1 - 2.2     BNP    Collection Time: 10/05/21 12:45 PM   Result Value Ref Range    NT pro-BNP 89 <125 pg/mL   TROPONIN I    Collection Time: 10/05/21 12:45 PM   Result Value Ref Range    Troponin-I, Qt. <0.05 <0.05 ng/mL   TROPONIN I    Collection Time: 10/05/21  8:20 PM   Result Value Ref Range    Troponin-I, Qt. <0.05 <0.38 ng/mL   METABOLIC PANEL, COMPREHENSIVE    Collection Time: 10/06/21  4:30 AM   Result Value Ref Range    Sodium 139 136 - 145 mmol/L    Potassium 3.8 3.5 - 5.1 mmol/L    Chloride 101 97 - 108 mmol/L    CO2 37 (H) 21 - 32 mmol/L    Anion gap 1 (L) 5 - 15 mmol/L    Glucose 207 (H) 65 - 100 mg/dL    BUN 12 6 - 20 mg/dL    Creatinine 0.43 (L) 0.55 - 1.02 mg/dL    BUN/Creatinine ratio 28 (H) 12 - 20      GFR est AA >60 >60 ml/min/1.73m2    GFR est non-AA >60 >60 ml/min/1.73m2    Calcium 10.0 8.5 - 10.1 mg/dL    Bilirubin, total 0.3 0.2 - 1.0 mg/dL    AST (SGOT) 16 15 - 37 U/L    ALT (SGPT) 29 12 - 78 U/L    Alk. phosphatase 94 45 - 117 U/L    Protein, total 6.8 6.4 - 8.2 g/dL    Albumin 3.2 (L) 3.5 - 5.0 g/dL    Globulin 3.6 2.0 - 4.0 g/dL    A-G Ratio 0.9 (L) 1.1 - 2.2     CBC WITH AUTOMATED DIFF    Collection Time: 10/06/21  4:30 AM   Result Value Ref Range    WBC 4.1 3.6 - 11.0 K/uL    RBC 4.43 3.80 - 5.20 M/uL    HGB 12.3 11.5 - 16.0 g/dL    HCT 40.4 35.0 - 47.0 %    MCV 91.2 80.0 - 99.0 FL    MCH 27.8 26.0 - 34.0 PG    MCHC 30.4 30.0 - 36.5 g/dL    RDW 12.0 11.5 - 14.5 %    PLATELET 117 907 - 427 K/uL    MPV 10.8 8.9 - 12.9 FL    NRBC 0.0 0.0  WBC    ABSOLUTE NRBC 0.00 0.00 - 0.01 K/uL    NEUTROPHILS 87 (H) 32 - 75 %    LYMPHOCYTES 11 (L) 12 - 49 %    MONOCYTES 1 (L) 5 - 13 %    EOSINOPHILS 0 0 - 7 %    BASOPHILS 0 0 - 1 %    IMMATURE GRANULOCYTES 1 (H) 0 - 0.5 %    ABS. NEUTROPHILS 3.6 1.8 - 8.0 K/UL    ABS. LYMPHOCYTES 0.5 (L) 0.8 - 3.5 K/UL    ABS. MONOCYTES 0.0 0.0 - 1.0 K/UL    ABS. EOSINOPHILS 0.0 0.0 - 0.4 K/UL    ABS. BASOPHILS 0.0 0.0 - 0.1 K/UL    ABS. IMM. GRANS. 0.0 0.00 - 0.04 K/UL    DF AUTOMATED     GLUCOSE, POC    Collection Time: 10/06/21  8:32 AM   Result Value Ref Range    Glucose (POC) 161 (H) 65 - 117 mg/dL    Performed by Severino Villanueva      [unfilled]      Review of Systems    Constitutional: Negative for chills and fever. HENT: Negative. Eyes: Negative. Respiratory: Negative. Cardiovascular: Negative. Gastrointestinal: Negative for abdominal pain and nausea. Skin: Negative. Neurological: Negative. Physical Exam:      Constitutional: pt is oriented to person, place, and time. HENT:   Head: Normocephalic and atraumatic. Eyes: Pupils are equal, round, and reactive to light. EOM are normal.   Cardiovascular: Normal rate, regular rhythm and normal heart sounds. Pulmonary/Chest: Breath sounds normal. No wheezes. No rales. Exhibits no tenderness. Abdominal: Soft. Bowel sounds are normal. There is no abdominal tenderness. There is no rebound and no guarding. Musculoskeletal: Normal range of motion. Neurological: pt is alert and oriented to person, place, and time. CTA CHEST W OR W WO CONT   Final Result   1. No large central pulmonary embolus. Main pulmonary artery is larger than the   aorta suggesting pulmonary arterial hypertension. 2. Cardiomegaly. 3. Bilateral lower lung atelectasis or infiltrates.       XR CHEST PORT   Final Result           Recent Results (from the past 24 hour(s))   COVID-19 RAPID TEST    Collection Time: 10/05/21 10:10 AM   Result Value Ref Range    Specimen source Please find results under separate order      COVID-19 rapid test Not Detected Not Detected     INFLUENZA A & B AG (RAPID TEST)    Collection Time: 10/05/21 10:10 AM   Result Value Ref Range    Influenza A Antigen Negative Negative      Influenza B Antigen Negative Negative     URINALYSIS W/ REFLEX CULTURE    Collection Time: 10/05/21 11:25 AM    Specimen: Urine   Result Value Ref Range    Color Yellow/Straw      Appearance Clear Clear      Specific gravity 1.014 1.003 - 1.030      pH (UA) 7.0 5.0 - 8.0      Protein Negative Negative mg/dL    Glucose Negative Negative mg/dL    Ketone Negative Negative mg/dL    Bilirubin Negative Negative      Blood Negative Negative      Urobilinogen 0.1 0.1 - 1.0 EU/dL    Nitrites Negative Negative      Leukocyte Esterase Negative Negative      UA:UC IF INDICATED Culture not indicated by UA result Culture not indicated by UA result      WBC 0-4 0 - 4 /hpf    RBC 0-5 0 - 5 /hpf    Bacteria Negative Negative /hpf    Mucus Trace /lpf   CBC WITH AUTOMATED DIFF    Collection Time: 10/05/21 11:40 AM   Result Value Ref Range    WBC 4.2 3.6 - 11.0 K/uL    RBC 4.34 3.80 - 5.20 M/uL    HGB 12.4 11.5 - 16.0 g/dL    HCT 39.1 35.0 - 47.0 %    MCV 90.1 80.0 - 99.0 FL    MCH 28.6 26.0 - 34.0 PG    MCHC 31.7 30.0 - 36.5 g/dL    RDW 11.9 11.5 - 14.5 %    PLATELET 733 266 - 361 K/uL    MPV 10.5 8.9 - 12.9 FL    NRBC 0.0 0.0  WBC    ABSOLUTE NRBC 0.00 0.00 - 0.01 K/uL    NEUTROPHILS 42 32 - 75 %    LYMPHOCYTES 42 12 - 49 %    MONOCYTES 14 (H) 5 - 13 %    EOSINOPHILS 1 0 - 7 %    BASOPHILS 1 0 - 1 %    IMMATURE GRANULOCYTES 0 0 - 0.5 %    ABS. NEUTROPHILS 1.8 1.8 - 8.0 K/UL    ABS. LYMPHOCYTES 1.8 0.8 - 3.5 K/UL    ABS. MONOCYTES 0.6 0.0 - 1.0 K/UL    ABS. EOSINOPHILS 0.0 0.0 - 0.4 K/UL    ABS. BASOPHILS 0.0 0.0 - 0.1 K/UL    ABS. IMM.  GRANS. 0.0 0.00 - 0.04 K/UL    DF AUTOMATED     CK W/ REFLX CKMB    Collection Time: 10/05/21 12:45 PM   Result Value Ref Range    CK 38.2 26 - 615 ng/mL   METABOLIC PANEL, COMPREHENSIVE    Collection Time: 10/05/21 12:45 PM   Result Value Ref Range    Sodium 141 136 - 145 mmol/L    Potassium 3.4 (L) 3.5 - 5.1 mmol/L    Chloride 102 97 - 108 mmol/L    CO2 37 (H) 21 - 32 mmol/L    Anion gap 2 (L) 5 - 15 mmol/L    Glucose 101 (H) 65 - 100 mg/dL    BUN 12 6 - 20 mg/dL    Creatinine 0.40 (L) 0.55 - 1.02 mg/dL    BUN/Creatinine ratio 30 (H) 12 - 20      GFR est AA >60 >60 ml/min/1.73m2 GFR est non-AA >60 >60 ml/min/1.73m2    Calcium 9.8 8.5 - 10.1 mg/dL    Bilirubin, total 0.4 0.2 - 1.0 mg/dL    AST (SGOT) 18 15 - 37 U/L    ALT (SGPT) 27 12 - 78 U/L    Alk. phosphatase 88 45 - 117 U/L    Protein, total 6.5 6.4 - 8.2 g/dL    Albumin 3.2 (L) 3.5 - 5.0 g/dL    Globulin 3.3 2.0 - 4.0 g/dL    A-G Ratio 1.0 (L) 1.1 - 2.2     BNP    Collection Time: 10/05/21 12:45 PM   Result Value Ref Range    NT pro-BNP 89 <125 pg/mL   TROPONIN I    Collection Time: 10/05/21 12:45 PM   Result Value Ref Range    Troponin-I, Qt. <0.05 <0.05 ng/mL   TROPONIN I    Collection Time: 10/05/21  8:20 PM   Result Value Ref Range    Troponin-I, Qt. <0.05 <0.25 ng/mL   METABOLIC PANEL, COMPREHENSIVE    Collection Time: 10/06/21  4:30 AM   Result Value Ref Range    Sodium 139 136 - 145 mmol/L    Potassium 3.8 3.5 - 5.1 mmol/L    Chloride 101 97 - 108 mmol/L    CO2 37 (H) 21 - 32 mmol/L    Anion gap 1 (L) 5 - 15 mmol/L    Glucose 207 (H) 65 - 100 mg/dL    BUN 12 6 - 20 mg/dL    Creatinine 0.43 (L) 0.55 - 1.02 mg/dL    BUN/Creatinine ratio 28 (H) 12 - 20      GFR est AA >60 >60 ml/min/1.73m2    GFR est non-AA >60 >60 ml/min/1.73m2    Calcium 10.0 8.5 - 10.1 mg/dL    Bilirubin, total 0.3 0.2 - 1.0 mg/dL    AST (SGOT) 16 15 - 37 U/L    ALT (SGPT) 29 12 - 78 U/L    Alk.  phosphatase 94 45 - 117 U/L    Protein, total 6.8 6.4 - 8.2 g/dL    Albumin 3.2 (L) 3.5 - 5.0 g/dL    Globulin 3.6 2.0 - 4.0 g/dL    A-G Ratio 0.9 (L) 1.1 - 2.2     CBC WITH AUTOMATED DIFF    Collection Time: 10/06/21  4:30 AM   Result Value Ref Range    WBC 4.1 3.6 - 11.0 K/uL    RBC 4.43 3.80 - 5.20 M/uL    HGB 12.3 11.5 - 16.0 g/dL    HCT 40.4 35.0 - 47.0 %    MCV 91.2 80.0 - 99.0 FL    MCH 27.8 26.0 - 34.0 PG    MCHC 30.4 30.0 - 36.5 g/dL    RDW 12.0 11.5 - 14.5 %    PLATELET 708 684 - 161 K/uL    MPV 10.8 8.9 - 12.9 FL    NRBC 0.0 0.0  WBC    ABSOLUTE NRBC 0.00 0.00 - 0.01 K/uL    NEUTROPHILS 87 (H) 32 - 75 %    LYMPHOCYTES 11 (L) 12 - 49 %    MONOCYTES 1 (L) 5 - 13 %    EOSINOPHILS 0 0 - 7 %    BASOPHILS 0 0 - 1 %    IMMATURE GRANULOCYTES 1 (H) 0 - 0.5 %    ABS. NEUTROPHILS 3.6 1.8 - 8.0 K/UL    ABS. LYMPHOCYTES 0.5 (L) 0.8 - 3.5 K/UL    ABS. MONOCYTES 0.0 0.0 - 1.0 K/UL    ABS. EOSINOPHILS 0.0 0.0 - 0.4 K/UL    ABS. BASOPHILS 0.0 0.0 - 0.1 K/UL    ABS. IMM. GRANS. 0.0 0.00 - 0.04 K/UL    DF AUTOMATED     GLUCOSE, POC    Collection Time: 10/06/21  8:32 AM   Result Value Ref Range    Glucose (POC) 161 (H) 65 - 117 mg/dL    Performed by Caprice Barrera        Results     Procedure Component Value Units Date/Time    CULTURE, BLOOD #2 [[id number]] Collected: 10/05/21 2341    Order Status: Completed Specimen: Blood Updated: 10/06/21 0000    CULTURE, BLOOD #1 [[id number]] Collected: 10/05/21 2338    Order Status: Completed Specimen: Blood Updated: 10/06/21 0000    COVID-19 RAPID TEST [[id number]] Collected: 10/05/21 1010    Order Status: Completed Specimen: Nasopharyngeal Updated: 10/05/21 1051     Specimen source       Please find results under separate order           COVID-19 rapid test Not Detected        Comment: Rapid Abbott ID Now   Rapid NAAT:  The specimen is NEGATIVE for SARS-CoV-2, the novel coronavirus associated with COVID-19. Negative results should be treated as presumptive and, if inconsistent with clinical signs and symptoms or necessary for patient management, should be tested with an alternative molecular assay. Negative results do not preclude SARS-CoV-2 infection and should not be used as the sole basis for patient management decisions. This test has been authorized by the FDA under   an Emergency Use Authorization (EUA) for use by authorized laboratories.  Fact sheet for Healthcare Providers: ConventionUpdate.co.nz Fact sheet for Patients: ConventionUpdate.co.nz   Methodology: Isothermal Nucleic Acid Amplification         INFLUENZA A & B AG (RAPID TEST) [[id number]] Collected: 10/05/21 1010    Order Status: Completed Specimen: Nasopharyngeal from Nasal washing Updated: 10/05/21 1109     Influenza A Antigen Negative        Influenza B Antigen Negative              Labs:     Recent Labs     10/06/21  0430 10/05/21  1140   WBC 4.1 4.2   HGB 12.3 12.4   HCT 40.4 39.1    228     Recent Labs     10/06/21  0430 10/05/21  1245    141   K 3.8 3.4*    102   CO2 37* 37*   BUN 12 12   CREA 0.43* 0.40*   * 101*   CA 10.0 9.8     Recent Labs     10/06/21  0430 10/05/21  1245   ALT 29 27   AP 94 88   TBILI 0.3 0.4   TP 6.8 6.5   ALB 3.2* 3.2*   GLOB 3.6 3.3     No results for input(s): INR, PTP, APTT, INREXT in the last 72 hours. No results for input(s): FE, TIBC, PSAT, FERR in the last 72 hours. No results found for: FOL, RBCF   No results for input(s): PH, PCO2, PO2 in the last 72 hours. Recent Labs     10/05/21  2020 10/05/21  1245   TROIQ <0.05 <0.05     No results found for: CHOL, CHOLX, CHLST, CHOLV, HDL, HDLP, LDL, LDLC, DLDLP, TGLX, TRIGL, TRIGP, CHHD, CHHDX  Lab Results   Component Value Date/Time    Glucose (POC) 161 (H) 10/06/2021 08:32 AM     Lab Results   Component Value Date/Time    Color Yellow/Straw 10/05/2021 11:25 AM    Appearance Clear 10/05/2021 11:25 AM    Specific gravity 1.014 10/05/2021 11:25 AM    pH (UA) 7.0 10/05/2021 11:25 AM    Protein Negative 10/05/2021 11:25 AM    Glucose Negative 10/05/2021 11:25 AM    Ketone Negative 10/05/2021 11:25 AM    Bilirubin Negative 10/05/2021 11:25 AM    Urobilinogen 0.1 10/05/2021 11:25 AM    Nitrites Negative 10/05/2021 11:25 AM    Leukocyte Esterase Negative 10/05/2021 11:25 AM    Bacteria Negative 10/05/2021 11:25 AM    WBC 0-4 10/05/2021 11:25 AM    RBC 0-5 10/05/2021 11:25 AM         Assessment:     Acute dyspnea  History of COVID-19  Type 2 diabetes  Hypertension  Hyperlipidemia  History of A.  Fib  Pulmonary hypertension      Plan:     Decrease off IV fluid 25 cc/h  2D echo  PT OT consult  Pulmonary consult    Continue following medications        Current Facility-Administered Medications:     amLODIPine (NORVASC) tablet 10 mg, 10 mg, Oral, DAILY, Beverly Cerna MD    apixaban (ELIQUIS) tablet 5 mg, 5 mg, Oral, BID, Beverly Cerna MD, 5 mg at 10/06/21 0941    chlorthalidone (HYGROTON) tablet 25 mg, 25 mg, Oral, DAILY, Beverly Cerna MD, 25 mg at 10/06/21 0944    gabapentin (NEURONTIN) capsule 100 mg, 100 mg, Oral, QHS, Beverly Cerna MD, 100 mg at 10/05/21 2010    glipiZIDE (GLUCOTROL) tablet 5 mg, 5 mg, Oral, DAILY, Martin Bolivar MD, 5 mg at 10/06/21 0943    magnesium oxide (MAG-OX) tablet 400 mg, 400 mg, Oral, DAILY, Beverly Cerna MD, 400 mg at 10/06/21 0941    oxybutynin (DITROPAN) tablet 5 mg, 5 mg, Oral, DAILY, Beverly Cerna MD, 5 mg at 10/06/21 0941    pravastatin (PRAVACHOL) tablet 80 mg, 80 mg, Oral, DAILY, Beverly Cerna MD, 80 mg at 10/06/21 0900    lisinopriL (PRINIVIL, ZESTRIL) tablet 40 mg, 40 mg, Oral, DAILY, Martin Bolivar MD, 40 mg at 10/06/21 0943    albuterol-ipratropium (DUO-NEB) 2.5 MG-0.5 MG/3 ML, 3 mL, Nebulization, Q6H RT, Beverly Cerna MD, 3 mL at 10/06/21 0810    methylPREDNISolone (PF) (SOLU-MEDROL) injection 40 mg, 40 mg, IntraVENous, Q6H, Beverly Cerna MD, 40 mg at 10/06/21 0557    insulin lispro (HUMALOG) injection, , SubCUTAneous, AC&HS, Danyelle Cerna MD    glucose chewable tablet 16 g, 4 Tablet, Oral, PRN, Danyelle Cerna MD    dextrose (D50W) injection syrg 12.5-25 g, 25-50 mL, IntraVENous, PRN, Danyelle Cerna MD    glucagon (GLUCAGEN) injection 1 mg, 1 mg, IntraMUSCular, PRN, Beverly Cerna MD    0.9% sodium chloride infusion, 75 mL/hr, IntraVENous, CONTINUOUS, Beverly Cerna MD, Last Rate: 75 mL/hr at 10/05/21 2350, 75 mL/hr at 10/05/21 2350    acetaminophen (TYLENOL) tablet 650 mg, 650 mg, Oral, Q6H PRN **OR** acetaminophen (TYLENOL) suppository 650 mg, 650 mg, Rectal, Q6H PRN, Danyelle Cerna MD    polyethylene glycol (MIRALAX) packet 17 g, 17 g, Oral, DAILY PRN, Macy Cerna MD    ondansetron (ZOFRAN ODT) tablet 4 mg, 4 mg, Oral, Q8H PRN **OR** ondansetron (ZOFRAN) injection 4 mg, 4 mg, IntraVENous, Q6H PRN, Macy Cerna MD    enoxaparin (LOVENOX) injection 40 mg, 40 mg, SubCUTAneous, DAILY, Beverly Cerna MD, 40 mg at 10/06/21 0946    famotidine (PEPCID) tablet 40 mg, 40 mg, Oral, BID, Beverly Cerna MD, 40 mg at 10/06/21 0940    Current Outpatient Medications:     apixaban (Eliquis) 5 mg tablet, Eliquis 5 mg tablet  Take 1 tablet twice a day by oral route., Disp: , Rfl:     glipiZIDE SR (GLUCOTROL XL) 5 mg CR tablet, glipizide ER 5 mg tablet, extended release 24 hr, Disp: , Rfl:     amLODIPine (NORVASC) 10 mg tablet, amlodipine 10 mg tablet, Disp: , Rfl:     pravastatin (PRAVACHOL) 80 mg tablet, pravastatin 80 mg tablet, Disp: , Rfl:     ramipriL (ALTACE) 10 mg capsule, , Disp: , Rfl:     chlorthalidone (HYGROTON) 25 mg tablet, chlorthalidone 25 mg tablet  TAKE 1 TABLET BY MOUTH EVERY DAY, Disp: , Rfl:     gabapentin (NEURONTIN) 100 mg capsule, gabapentin 100 mg capsule  TAKE 1 CAPSULE AT BEDTIME THEN SLOWLY INCREASE TO 1 CAPSULE 3 TIMES A DAY, Disp: , Rfl:     magnesium oxide (MAG-OX) 400 mg tablet, magnesium oxide 400 mg (241.3 mg magnesium) tablet  Take 1 tablet every day by oral route., Disp: , Rfl:     oxybutynin (DITROPAN) 5 mg tablet, 1 TABLET AT BEDTIME ORALLY ONCE A DAY 10 DAYS, Disp: , Rfl:     albuterol (PROVENTIL HFA, VENTOLIN HFA, PROAIR HFA) 90 mcg/actuation inhaler, albuterol sulfate HFA 90 mcg/actuation aerosol inhaler, Disp: , Rfl:     albuterol (PROVENTIL HFA, VENTOLIN HFA, PROAIR HFA) 90 mcg/actuation inhaler, Take 2 Puffs by inhalation every four (4) hours as needed for Wheezing., Disp: 1 Inhaler, Rfl: 1

## 2021-10-06 NOTE — ROUTINE PROCESS
TRANSFER - OUT REPORT:    Verbal report given to GUERLINE Estrada(name) on Marco A Sloan  being transferred to [facility](unit) for routine progression of care       Report consisted of patients Situation, Background, Assessment and   Recommendations(SBAR). Information from the following report(s) ED Summary was reviewed with the receiving nurse. Lines:   Peripheral IV 10/05/21 Left Antecubital (Active)       Peripheral IV 10/05/21 Right Antecubital (Active)        Opportunity for questions and clarification was provided.       Patient transported with:   Registered Nurse

## 2021-10-06 NOTE — ED NOTES
Verbal shift change report given to Maddy Ag RN (oncoming nurse) by Melody [address], RN (offgoing nurse). Report included the following information SBAR, Kardex, ED Summary, [facility] and Recent Results. 0300: Bedside and Verbal shift change report given to GUERLINE Bush (oncoming nurse) by Maddy Ag RN (offgoing nurse). Report included the following information SBAR, Kardex, ED Summary, MAR, Recent Results and Cardiac Rhythm Sinus.

## 2021-10-07 LAB
ALBUMIN SERPL-MCNC: 3.3 G/DL (ref 3.5–5)
ALBUMIN/GLOB SERPL: 1 {RATIO} (ref 1.1–2.2)
ALP SERPL-CCNC: 90 U/L (ref 45–117)
ALT SERPL-CCNC: 26 U/L (ref 12–78)
ANION GAP SERPL CALC-SCNC: 2 MMOL/L (ref 5–15)
AST SERPL W P-5'-P-CCNC: 17 U/L (ref 15–37)
BILIRUB SERPL-MCNC: 0.3 MG/DL (ref 0.2–1)
BUN SERPL-MCNC: 14 MG/DL (ref 6–20)
BUN/CREAT SERPL: 30 (ref 12–20)
CA-I BLD-MCNC: 10.6 MG/DL (ref 8.5–10.1)
CHLORIDE SERPL-SCNC: 103 MMOL/L (ref 97–108)
CO2 SERPL-SCNC: 35 MMOL/L (ref 21–32)
CREAT SERPL-MCNC: 0.46 MG/DL (ref 0.55–1.02)
ERYTHROCYTE [DISTWIDTH] IN BLOOD BY AUTOMATED COUNT: 11.9 % (ref 11.5–14.5)
GLOBULIN SER CALC-MCNC: 3.2 G/DL (ref 2–4)
GLUCOSE BLD STRIP.AUTO-MCNC: 174 MG/DL (ref 65–117)
GLUCOSE BLD STRIP.AUTO-MCNC: 240 MG/DL (ref 65–117)
GLUCOSE BLD STRIP.AUTO-MCNC: 271 MG/DL (ref 65–117)
GLUCOSE BLD STRIP.AUTO-MCNC: 84 MG/DL (ref 65–117)
GLUCOSE SERPL-MCNC: 208 MG/DL (ref 65–100)
HCT VFR BLD AUTO: 40.5 % (ref 35–47)
HGB BLD-MCNC: 12.5 G/DL (ref 11.5–16)
MCH RBC QN AUTO: 28 PG (ref 26–34)
MCHC RBC AUTO-ENTMCNC: 30.9 G/DL (ref 30–36.5)
MCV RBC AUTO: 90.6 FL (ref 80–99)
NRBC # BLD: 0 K/UL (ref 0–0.01)
NRBC BLD-RTO: 0 PER 100 WBC
PERFORMED BY, TECHID: ABNORMAL
PERFORMED BY, TECHID: NORMAL
PLATELET # BLD AUTO: 209 K/UL (ref 150–400)
PMV BLD AUTO: 10.7 FL (ref 8.9–12.9)
POTASSIUM SERPL-SCNC: 4.2 MMOL/L (ref 3.5–5.1)
PROT SERPL-MCNC: 6.5 G/DL (ref 6.4–8.2)
RBC # BLD AUTO: 4.47 M/UL (ref 3.8–5.2)
SODIUM SERPL-SCNC: 140 MMOL/L (ref 136–145)
WBC # BLD AUTO: 10 K/UL (ref 3.6–11)

## 2021-10-07 PROCEDURE — 92526 ORAL FUNCTION THERAPY: CPT

## 2021-10-07 PROCEDURE — 74011000250 HC RX REV CODE- 250: Performed by: FAMILY MEDICINE

## 2021-10-07 PROCEDURE — 74011250636 HC RX REV CODE- 250/636: Performed by: FAMILY MEDICINE

## 2021-10-07 PROCEDURE — 74011636637 HC RX REV CODE- 636/637: Performed by: INTERNAL MEDICINE

## 2021-10-07 PROCEDURE — 82962 GLUCOSE BLOOD TEST: CPT

## 2021-10-07 PROCEDURE — 97530 THERAPEUTIC ACTIVITIES: CPT

## 2021-10-07 PROCEDURE — 74011250637 HC RX REV CODE- 250/637: Performed by: FAMILY MEDICINE

## 2021-10-07 PROCEDURE — 92610 EVALUATE SWALLOWING FUNCTION: CPT

## 2021-10-07 PROCEDURE — 74011636637 HC RX REV CODE- 636/637: Performed by: FAMILY MEDICINE

## 2021-10-07 PROCEDURE — 80053 COMPREHEN METABOLIC PANEL: CPT

## 2021-10-07 PROCEDURE — 97161 PT EVAL LOW COMPLEX 20 MIN: CPT

## 2021-10-07 PROCEDURE — 97165 OT EVAL LOW COMPLEX 30 MIN: CPT

## 2021-10-07 PROCEDURE — 94640 AIRWAY INHALATION TREATMENT: CPT

## 2021-10-07 PROCEDURE — 85027 COMPLETE CBC AUTOMATED: CPT

## 2021-10-07 PROCEDURE — 77010033678 HC OXYGEN DAILY

## 2021-10-07 PROCEDURE — 65270000029 HC RM PRIVATE

## 2021-10-07 PROCEDURE — 94760 N-INVAS EAR/PLS OXIMETRY 1: CPT

## 2021-10-07 PROCEDURE — 36415 COLL VENOUS BLD VENIPUNCTURE: CPT

## 2021-10-07 RX ORDER — PREDNISONE 5 MG/1
10 TABLET ORAL
Status: DISCONTINUED | OUTPATIENT
Start: 2021-10-07 | End: 2021-10-11 | Stop reason: HOSPADM

## 2021-10-07 RX ADMIN — IPRATROPIUM BROMIDE AND ALBUTEROL SULFATE 3 ML: .5; 2.5 SOLUTION RESPIRATORY (INHALATION) at 19:21

## 2021-10-07 RX ADMIN — APIXABAN 5 MG: 5 TABLET, FILM COATED ORAL at 21:05

## 2021-10-07 RX ADMIN — PRAVASTATIN SODIUM 80 MG: 20 TABLET ORAL at 08:58

## 2021-10-07 RX ADMIN — CHLORTHALIDONE 25 MG: 25 TABLET ORAL at 09:11

## 2021-10-07 RX ADMIN — OXYBUTYNIN CHLORIDE 5 MG: 5 TABLET ORAL at 08:59

## 2021-10-07 RX ADMIN — LISINOPRIL 40 MG: 40 TABLET ORAL at 08:59

## 2021-10-07 RX ADMIN — INSULIN LISPRO 4 UNITS: 100 INJECTION, SOLUTION INTRAVENOUS; SUBCUTANEOUS at 11:30

## 2021-10-07 RX ADMIN — APIXABAN 5 MG: 5 TABLET, FILM COATED ORAL at 08:58

## 2021-10-07 RX ADMIN — ACETAMINOPHEN 650 MG: 325 TABLET ORAL at 04:43

## 2021-10-07 RX ADMIN — Medication 400 MG: at 08:59

## 2021-10-07 RX ADMIN — AMLODIPINE BESYLATE 10 MG: 5 TABLET ORAL at 21:05

## 2021-10-07 RX ADMIN — FAMOTIDINE 40 MG: 20 TABLET ORAL at 21:05

## 2021-10-07 RX ADMIN — FAMOTIDINE 40 MG: 20 TABLET ORAL at 08:58

## 2021-10-07 RX ADMIN — PREDNISONE 10 MG: 5 TABLET ORAL at 13:51

## 2021-10-07 RX ADMIN — METHYLPREDNISOLONE SODIUM SUCCINATE 40 MG: 40 INJECTION, POWDER, FOR SOLUTION INTRAMUSCULAR; INTRAVENOUS at 00:02

## 2021-10-07 RX ADMIN — GLIPIZIDE 5 MG: 5 TABLET ORAL at 08:58

## 2021-10-07 RX ADMIN — IPRATROPIUM BROMIDE AND ALBUTEROL SULFATE 3 ML: .5; 2.5 SOLUTION RESPIRATORY (INHALATION) at 08:50

## 2021-10-07 RX ADMIN — IPRATROPIUM BROMIDE AND ALBUTEROL SULFATE 3 ML: .5; 2.5 SOLUTION RESPIRATORY (INHALATION) at 01:25

## 2021-10-07 RX ADMIN — METHYLPREDNISOLONE SODIUM SUCCINATE 40 MG: 40 INJECTION, POWDER, FOR SOLUTION INTRAMUSCULAR; INTRAVENOUS at 05:53

## 2021-10-07 RX ADMIN — GABAPENTIN 100 MG: 100 CAPSULE ORAL at 21:05

## 2021-10-07 RX ADMIN — ENOXAPARIN SODIUM 40 MG: 40 INJECTION SUBCUTANEOUS at 09:00

## 2021-10-07 RX ADMIN — IPRATROPIUM BROMIDE AND ALBUTEROL SULFATE 3 ML: .5; 2.5 SOLUTION RESPIRATORY (INHALATION) at 13:32

## 2021-10-07 RX ADMIN — INSULIN LISPRO 4 UNITS: 100 INJECTION, SOLUTION INTRAVENOUS; SUBCUTANEOUS at 21:05

## 2021-10-07 RX ADMIN — INSULIN LISPRO 3 UNITS: 100 INJECTION, SOLUTION INTRAVENOUS; SUBCUTANEOUS at 08:56

## 2021-10-07 NOTE — PROGRESS NOTES
Problem: Pressure Injury - Risk of  Goal: *Prevention of pressure injury  Description: Document Nestor Scale and appropriate interventions in the flowsheet. 10/7/2021 0347 by Candance August, RN  Outcome: Progressing Towards Goal  Note: Pressure Injury Interventions:  Sensory Interventions: Float heels, Keep linens dry and wrinkle-free, Maintain/enhance activity level, Minimize linen layers, Turn and reposition approx. every two hours (pillows and wedges if needed), Use 30-degree side-lying position    Moisture Interventions: Absorbent underpads, Apply protective barrier, creams and emollients, Internal/External urinary devices, Limit adult briefs, Maintain skin hydration (lotion/cream)    Activity Interventions: Increase time out of bed    Mobility Interventions: HOB 30 degrees or less, Float heels, Turn and reposition approx. every two hours(pillow and wedges)    Nutrition Interventions: Document food/fluid/supplement intake                  10/7/2021 0346 by Candance August, RN  Outcome: Progressing Towards Goal  Note: Pressure Injury Interventions:  Sensory Interventions: Float heels, Keep linens dry and wrinkle-free, Maintain/enhance activity level, Minimize linen layers, Turn and reposition approx. every two hours (pillows and wedges if needed), Use 30-degree side-lying position    Moisture Interventions: Absorbent underpads, Apply protective barrier, creams and emollients, Internal/External urinary devices, Limit adult briefs, Maintain skin hydration (lotion/cream)    Activity Interventions: Increase time out of bed    Mobility Interventions: HOB 30 degrees or less, Float heels, Turn and reposition approx.  every two hours(pillow and wedges)    Nutrition Interventions: Document food/fluid/supplement intake                     Problem: Patient Education: Go to Patient Education Activity  Goal: Patient/Family Education  10/7/2021 0347 by Candance August, RN  Outcome: Progressing Towards Goal  10/7/2021 0346 by Donis Case RN  Outcome: Progressing Towards Goal     Problem: Falls - Risk of  Goal: *Absence of Falls  Description: Document Kenzie Moon Fall Risk and appropriate interventions in the flowsheet.   10/7/2021 0347 by Donis Case RN  Outcome: Progressing Towards Goal  Note: Fall Risk Interventions:  Mobility Interventions: Bed/chair exit alarm, Patient to call before getting OOB, Strengthening exercises (ROM-active/passive), Utilize walker, cane, or other assistive device              Elimination Interventions: Bed/chair exit alarm, Call light in reach, Stay With Me (per policy)           13/3/5547 0346 by Donis Case RN  Outcome: Progressing Towards Goal  Note: Fall Risk Interventions:  Mobility Interventions: Bed/chair exit alarm, Patient to call before getting OOB, Strengthening exercises (ROM-active/passive), Utilize walker, cane, or other assistive device      Elimination Interventions: Bed/chair exit alarm, Call light in reach, Stay With Me (per policy)      Problem: Patient Education: Go to Patient Education Activity  Goal: Patient/Family Education  10/7/2021 0347 by Donis Case RN  Outcome: Progressing Towards Goal  10/7/2021 0346 by Donis Case RN  Outcome: Progressing Towards Goal     Problem: Gas Exchange - Impaired  Goal: *Absence of hypoxia  Outcome: Progressing Towards Goal  Note:   Activity tolerance assessment (eg: Vital signs including apical pulse; level of consciousness; oxygen saturation level; skin color; dyspnea on exertion; diaphoresis)    Respiratory assessment (eg: Rate; depth; rhythm; lung/breath sounds; use of accessory muscles; secretions)    Mental status assessment    Vital signs assessment    Respiratory management (eg: Oxygen therapy; breathing exercises; early mobilization; suctioning)    Energy conservation management    Cough promotion    Position change (eg: Head of bed elevation; position change every 2 hours)    Incentive spirometry

## 2021-10-07 NOTE — PROGRESS NOTES
CM spoke with patient and family at bedside. They are in agreement with IRF. Choice letter signed and placed on chart. Referral sent to [facility] in [place]. CM will continue to follow.

## 2021-10-07 NOTE — PROGRESS NOTES
Hospitalist Progress Note    Subjective:   Daily Progress Note: 10/7/2021 11:58 AM    Patient is N 54 y. o. year old female past medical history of diabetes hypertension hyperlipidemia chronic A. fib came to emergency room because of shortness of breath and dyspnea according the patient patient have Covid 19 in April since then patient feeling weak tired seen in the ER 3 times but recently getting more worse came to the emergency room seen by the ER physician    10/7  Patient seen today resting in recliner. She appeared fatigued. She admits to dyspnea on exertion, orthopnea, as well as constipation with her last bowel movement being on sunday. She denies chest pain, fever, abdominal pain. Echocardiogram: Summary  · LV: Estimated LVEF is 60 - 65%. Normal cavity size, wall thickness and systolic function (ejection fraction normal). · Image quality for this study was technically difficult.       Current Facility-Administered Medications   Medication Dose Route Frequency    predniSONE (DELTASONE) tablet 10 mg  10 mg Oral DAILY WITH BREAKFAST    amLODIPine (NORVASC) tablet 10 mg  10 mg Oral DAILY    apixaban (ELIQUIS) tablet 5 mg  5 mg Oral BID    chlorthalidone (HYGROTON) tablet 25 mg  25 mg Oral DAILY    gabapentin (NEURONTIN) capsule 100 mg  100 mg Oral QHS    glipiZIDE (GLUCOTROL) tablet 5 mg  5 mg Oral DAILY    magnesium oxide (MAG-OX) tablet 400 mg  400 mg Oral DAILY    oxybutynin (DITROPAN) tablet 5 mg  5 mg Oral DAILY    pravastatin (PRAVACHOL) tablet 80 mg  80 mg Oral DAILY    lisinopriL (PRINIVIL, ZESTRIL) tablet 40 mg  40 mg Oral DAILY    albuterol-ipratropium (DUO-NEB) 2.5 MG-0.5 MG/3 ML  3 mL Nebulization Q6H RT    insulin lispro (HUMALOG) injection   SubCUTAneous AC&HS    glucose chewable tablet 16 g  4 Tablet Oral PRN    dextrose (D50W) injection syrg 12.5-25 g  25-50 mL IntraVENous PRN    glucagon (GLUCAGEN) injection 1 mg  1 mg IntraMUSCular PRN    0.9% sodium chloride infusion 25 mL/hr IntraVENous CONTINUOUS    acetaminophen (TYLENOL) tablet 650 mg  650 mg Oral Q6H PRN    Or    acetaminophen (TYLENOL) suppository 650 mg  650 mg Rectal Q6H PRN    polyethylene glycol (MIRALAX) packet 17 g  17 g Oral DAILY PRN    ondansetron (ZOFRAN ODT) tablet 4 mg  4 mg Oral Q8H PRN    Or    ondansetron (ZOFRAN) injection 4 mg  4 mg IntraVENous Q6H PRN    enoxaparin (LOVENOX) injection 40 mg  40 mg SubCUTAneous DAILY    famotidine (PEPCID) tablet 40 mg  40 mg Oral BID            Objective:     Visit Vitals  BP (!) 176/83 (BP 1 Location: Right upper arm, BP Patient Position: At rest)   Pulse 81   Temp 98.5 °F (36.9 °C)   Resp 18   Ht 5' 4.5\" (1.638 m)   Wt 107.5 kg (236 lb 15.9 oz)   SpO2 95%   BMI 40.05 kg/m²    O2 Flow Rate (L/min): 2 l/min O2 Device: None (Room air)    Temp (24hrs), Av.3 °F (36.8 °C), Min:97.6 °F (36.4 °C), Max:98.7 °F (37.1 °C)      No intake/output data recorded. 10/05 1901 - 10/07 0700  In: -   Out: 900 [Urine:900]    Physical Exam:  Constitutional: Alert and awke  HENT:  Head: Normocephalic and atraumatic. Eyes: Pupils are equal, round, and reactive to light. EOM are normal.   Cardiovascular: Normal rate, regular rhythm and normal heart sounds. Pulmonary/Chest: Breath sounds normal. No wheezes. No rales. Exhibits no tenderness. Abdominal: Soft. Bowel sounds are normal. There is no abdominal tenderness. There is no rebound and no guarding. Musculoskeletal: Normal range of motion. Neurological: apporiate mood and judgement   Skin:       Data Review    24 hrs labs reviewed. Echocardiogram: Summary  · LV: Estimated LVEF is 60 - 65%. Normal cavity size, wall thickness and systolic function (ejection fraction normal). · Image quality for this study was technically difficult.         Recent Results (from the past 24 hour(s))   GLUCOSE, POC    Collection Time: 10/06/21  2:57 PM   Result Value Ref Range    Glucose (POC) 194 (H) 65 - 117 mg/dL    Performed by Jyoti Leung (PCT)    ECHO ADULT COMPLETE    Collection Time: 10/06/21  4:01 PM   Result Value Ref Range    Aortic Valve Systolic Peak Velocity 993.82 cm/s    AoV PG 4.00 mmHg    Ao Root D 3.10 cm    IVSd 2.23 (A) 0.60 - 0.90 cm    LVIDd 3.13 (A) 3.90 - 5.30 cm    LVIDs 2.06 cm    LVOT Peak Velocity 90.70 cm/s    LVOT Peak Gradient 3.00 mmHg    LVPWd 1.09 (A) 0.60 - 0.90 cm    LV E' Septal Velocity 5.01 cm/s    LV ED Vol A2C 30.70 cm3    LV ES Vol A2C 8.74 cm3    E/E' septal 17.76     Left Atrium Major Axis 2.60 cm    Mitral Valve Deceleration [place] 6,480.00 mm/s2    Mitral Valve Deceleration [place] 6,480.00 mm/s2    Mitral Valve E Wave Deceleration Time 136.00 ms    Mitral Valve Pressure Half-time 40.00 ms    MV A Marvin 151.00 cm/s    MV E Marvin 89.00 cm/s    MVA (PHT) 5.50 cm2    MV E/A 0.59     Pulmonic Valve Max Velocity 116.00 cm/s    Pulmonic Valve Systolic Peak Instantaneous Gradient 5.00 mmHg    Est. RA Pressure 3.00 mmHg    RVIDd 3.05 cm    RVSP 35.00 mmHg    TR Max Velocity 282.00 cm/s    TV MG 32.00 mmHg    BP EF 64.7 55.0 - 100.0 %    LV Mass .3 67.0 - 162.0 g    LV Mass AL Index 94.0 43.0 - 95.0 g/m2    Left Atrium Minor Axis 1.23 cm   GLUCOSE, POC    Collection Time: 10/06/21  7:18 PM   Result Value Ref Range    Glucose (POC) 176 (H) 65 - 117 mg/dL    Performed by Assawoman Mook    CBC W/O DIFF    Collection Time: 10/07/21  7:08 AM   Result Value Ref Range    WBC 10.0 3.6 - 11.0 K/uL    RBC 4.47 3.80 - 5.20 M/uL    HGB 12.5 11.5 - 16.0 g/dL    HCT 40.5 35.0 - 47.0 %    MCV 90.6 80.0 - 99.0 FL    MCH 28.0 26.0 - 34.0 PG    MCHC 30.9 30.0 - 36.5 g/dL    RDW 11.9 11.5 - 14.5 %    PLATELET 523 324 - 370 K/uL    MPV 10.7 8.9 - 12.9 FL    NRBC 0.0 0.0  WBC    ABSOLUTE NRBC 0.00 0.00 - 6.40 K/uL   METABOLIC PANEL, COMPREHENSIVE    Collection Time: 10/07/21  7:08 AM   Result Value Ref Range    Sodium 140 136 - 145 mmol/L    Potassium 4.2 3.5 - 5.1 mmol/L    Chloride 103 97 - 108 mmol/L    CO2 35 (H) 21 - 32 mmol/L    Anion gap 2 (L) 5 - 15 mmol/L    Glucose 208 (H) 65 - 100 mg/dL    BUN 14 6 - 20 mg/dL    Creatinine 0.46 (L) 0.55 - 1.02 mg/dL    BUN/Creatinine ratio 30 (H) 12 - 20      GFR est AA >60 >60 ml/min/1.73m2    GFR est non-AA >60 >60 ml/min/1.73m2    Calcium 10.6 (H) 8.5 - 10.1 mg/dL    Bilirubin, total 0.3 0.2 - 1.0 mg/dL    AST (SGOT) 17 15 - 37 U/L    ALT (SGPT) 26 12 - 78 U/L    Alk. phosphatase 90 45 - 117 U/L    Protein, total 6.5 6.4 - 8.2 g/dL    Albumin 3.3 (L) 3.5 - 5.0 g/dL    Globulin 3.2 2.0 - 4.0 g/dL    A-G Ratio 1.0 (L) 1.1 - 2.2     GLUCOSE, POC    Collection Time: 10/07/21  7:44 AM   Result Value Ref Range    Glucose (POC) 174 (H) 65 - 117 mg/dL    Performed by Xavier Sal    GLUCOSE, POC    Collection Time: 10/07/21 11:02 AM   Result Value Ref Range    Glucose (POC) 240 (H) 65 - 117 mg/dL    Performed by Corinne Noble          Assessment/Plan:     Acute dyspnea  History of COVID-19  Type 2 diabetes  Hypertension  Hyperlipidemia  History of A. Fib  Pulmonary hypertension  · LV: Estimated LVEF is 60 - 65%. Normal cavity size, wall thickness and systolic function (ejection fraction normal). PLAN    Per Pulm:   -Discontinue Solu-Medrol, start patient on low-dose prednisone  -sleep study as an outpatient    Per cardiology:  -Chronic atrial fibrillation:   - Rate controlled. Will add Cardizem if rate is uncontrolled. Her CHADS2-VASc score is 4 ; continue Eliquis Continue telemetry monitoring.  Keep serum potassium between 4-5 and serum magnesium > 2.   - Recommend outpatient appointment to establish care    Continue following medications  -Give Miralax for constipation    PT OT consult and then discharge tomorrow discussed with the patient daughter at the bedside    Current Facility-Administered Medications:     predniSONE (DELTASONE) tablet 10 mg, 10 mg, Oral, DAILY WITH BREAKFAST, Moody Newsome MD    amLODIPine (NORVASC) tablet 10 mg, 10 mg, Oral, DAILY, Eryn, Rajinder Overton MD    apixaban Gisel Beers) tablet 5 mg, 5 mg, Oral, BID, Rajinder Cerna MD, 5 mg at 10/07/21 0858    chlorthalidone (HYGROTON) tablet 25 mg, 25 mg, Oral, DAILY, Neyda Soto MD, 25 mg at 10/07/21 0911    gabapentin (NEURONTIN) capsule 100 mg, 100 mg, Oral, QHS, Beverly Cerna MD, 100 mg at 10/06/21 2100    glipiZIDE (GLUCOTROL) tablet 5 mg, 5 mg, Oral, DAILY, Neyda Soto MD, 5 mg at 10/07/21 0858    magnesium oxide (MAG-OX) tablet 400 mg, 400 mg, Oral, DAILY, Beverly Cerna MD, 400 mg at 10/07/21 0859    oxybutynin (DITROPAN) tablet 5 mg, 5 mg, Oral, DAILY, Neyda Soto MD, 5 mg at 10/07/21 0859    pravastatin (PRAVACHOL) tablet 80 mg, 80 mg, Oral, DAILY, Beverly Cerna MD, 80 mg at 10/07/21 0858    lisinopriL (PRINIVIL, ZESTRIL) tablet 40 mg, 40 mg, Oral, DAILY, Neyda Soto MD, 40 mg at 10/07/21 0859    albuterol-ipratropium (DUO-NEB) 2.5 MG-0.5 MG/3 ML, 3 mL, Nebulization, Q6H RT, Neyda Soto MD, 3 mL at 10/07/21 0850    insulin lispro (HUMALOG) injection, , SubCUTAneous, AC&HS, Neyda Soto MD, 3 Units at 10/07/21 0856    glucose chewable tablet 16 g, 4 Tablet, Oral, PRN, Rajinder Cerna MD    dextrose (D50W) injection syrg 12.5-25 g, 25-50 mL, IntraVENous, PRN, Rajinder Cerna MD    glucagon (GLUCAGEN) injection 1 mg, 1 mg, IntraMUSCular, PRN, Neyda Soto MD    0.9% sodium chloride infusion, 25 mL/hr, IntraVENous, CONTINUOUS, Beverly Cerna MD, Last Rate: 25 mL/hr at 10/06/21 1125, 25 mL/hr at 10/06/21 1125    acetaminophen (TYLENOL) tablet 650 mg, 650 mg, Oral, Q6H PRN, 650 mg at 10/07/21 0443 **OR** acetaminophen (TYLENOL) suppository 650 mg, 650 mg, Rectal, Q6H PRN, Beverly Cerna MD    polyethylene glycol (MIRALAX) packet 17 g, 17 g, Oral, DAILY PRN, Beverly Cerna MD    ondansetron (ZOFRAN ODT) tablet 4 mg, 4 mg, Oral, Q8H PRN **OR** ondansetron (ZOFRAN) injection 4 mg, 4 mg, IntraVENous, Q6H PRN, MD Montse Montes enoxaparin (LOVENOX) injection 40 mg, 40 mg, SubCUTAneous, DAILY, Beverly Cerna MD, 40 mg at 10/07/21 0900    famotidine (PEPCID) tablet 40 mg, 40 mg, Oral, BID, Beverly Cerna MD, 40 mg at 10/07/21 1934

## 2021-10-07 NOTE — PROGRESS NOTES
SPEECH LANGUAGE PATHOLOGY BEDSIDE SWALLOW EVALUATIONS  Patient: Ni Landry  (19 y.o. )  Date: 10/7/2021  Primary Diagnosis: Dyspnea, SOB   Precautions: Fall    ASSESSMENT :  Patient is alert, oriented x4, and follows basic commands. Daughter at bedside w/ consent. Patient sitting in chair reports globus sensation w/ pills and solids. Recently referred to GI following MBS and esophagram.   Overall, oropharyngeal swallow is wfl. However, multiple audible swallows consistent w/ report of cricopharyngeal hypertrophy. Extensive education provided w/ demonstration and diagrams. Patient and daughter engaged. Introduced swallow strategies and provided GERD diet. Patient will benefit from skilled intervention to address the above impairments. Patients rehabilitation potential is considered to be Good     PLAN :  Recommendations and Planned Interventions:  Rec easy to chew w/ thin liquids and strict GERD precautions. Rec f/u w/ GI for possible dilatation as medically indicated. Frequency/Duration: Patient will be followed by speech-language pathology 1 time a week to address goals. Discharge Recommendations: Outpatient     SUBJECTIVE:   Patient and daughter report recent MBS and esophagram at [facility] w/ recs for GI for dilatation. Patient experience pharyngeal globus sensation and chronic hoarse vocal quality. Patient reports seen by ENT placed on new PPI for chronic GERD. OBJECTIVE:   Patietn admitted w/ SOB. Hx of COVID in April 2021.    Past Medical History:   Diagnosis Date    Diabetes ([address]     High cholesterol     Hypertension        CXR Results  (Last 48 hours)      None            Diet prior to admission: Regular  Current Diet:  DIET ADULT     Cognitive and Communication Status:  Neurologic State: Alert  Orientation Level: Oriented X4  Cognition: Follows commands        Safety/Judgement: Fall prevention, Home safety    Pain:  Pain Scale 1: Numeric (0 - 10)  Pain Intensity 1: 0         After treatment:   Patient left in no apparent distress sitting up in chair, Call bell within reach, Nursing notified and Caregiver / family present    COMMUNICATION/EDUCATION:   Patient and daughter educated regarding diet recs, swallow strategies, GERD precautions, GERD diet, s/s aspiration and aspiration precautions. She demonstrated Good understanding as evidenced by engagement and appropriate questions. The patient's plan of care including recommendations, planned interventions, and recommended diet changes were discussed with: Registered nurse. Patient/family have participated as able in goal setting and plan of care. Patient/family agree to work toward stated goals and plan of care. Problem: Dysphagia (Adult)  Goal: *Acute Goals and Plan of Care (Insert Text)  Description: Speech Therapy Swallow Goals  Initiated 10/7/2021    -Patient will tolerate PO trials without clinical indicators of aspiration given no cues within 7 day(s). -Patient will demonstrate understanding of swallow safety precautions and aspiration precautions, diet recs with no cues within 7 day(s).      Outcome: Initial     Thank you for this referral.  Colt Garcia M.S., M.Ed., CCC-SLP  Time Calculation: 25 mins

## 2021-10-07 NOTE — PROGRESS NOTES
PULMONARY NOTE  [facility]    Name: Melly Qureshi MRN: [id number]   : 1950 Hospital: [address] [place] [address]   Date: 10/7/2021  Admission date: 10/5/2021 Hospital Day: 3       HPI:     Hospital Problems  Never Reviewed        Codes Class Noted POA    Dyspnea ICD-10-CM: R06.00  ICD-9-CM: 786.09  10/5/2021 Unknown        SOB (shortness of breath) ICD-10-CM: R06.02  ICD-9-CM: 786.05  10/5/2021 Unknown                   [x] High complexity decision making was performed  [x] See my orders for details      Subjective/Initial History:     I was asked by Wesley Parks MD to see Melly Qureshi  a 66 y.o.   female in consultation     Excerpts from admission 10/5/2021 or consult notes as follows:     28-year-old lady came in because of shortness of breath and dyspnea significant past medical history of chronic A. fib, diabetes mellitus hypertension hyperlipidemia.   She was diagnosed with COVID-19 pneumonia in April since then patient has not been feeling well she has multiple admissions and ER visits secondary to the above for shortness of breath and dyspnea according to the daughter she is not been feeling well so the past 2 days she is more short of breath so he came to the hospital got admitted and pulmonary consult was called    Allergies   Allergen Reactions    Doxycycline Unknown (comments)     HIVES    Metoprolol Unknown (comments)     HIVES        MAR reviewed and pertinent medications noted or modified as needed     Current Facility-Administered Medications   Medication    amLODIPine (NORVASC) tablet 10 mg    apixaban (ELIQUIS) tablet 5 mg    chlorthalidone (HYGROTON) tablet 25 mg    gabapentin (NEURONTIN) capsule 100 mg    glipiZIDE (GLUCOTROL) tablet 5 mg    magnesium oxide (MAG-OX) tablet 400 mg    oxybutynin (DITROPAN) tablet 5 mg    pravastatin (PRAVACHOL) tablet 80 mg    lisinopriL (PRINIVIL, ZESTRIL) tablet 40 mg    albuterol-ipratropium (DUO-NEB) 2.5 MG-0.5 MG/3 ML  methylPREDNISolone (PF) (SOLU-MEDROL) injection 40 mg    insulin lispro (HUMALOG) injection    glucose chewable tablet 16 g    dextrose (D50W) injection syrg 12.5-25 g    glucagon (GLUCAGEN) injection 1 mg    0.9% sodium chloride infusion    acetaminophen (TYLENOL) tablet 650 mg    Or    acetaminophen (TYLENOL) suppository 650 mg    polyethylene glycol (MIRALAX) packet 17 g    ondansetron (ZOFRAN ODT) tablet 4 mg    Or    ondansetron (ZOFRAN) injection 4 mg    enoxaparin (LOVENOX) injection 40 mg    famotidine (PEPCID) tablet 40 mg      Patient PCP: Brooklynn Medel MD  PMH:  has a past medical history of Diabetes ([address] High cholesterol, and Hypertension. PSH:   has a past surgical history that includes hx hemorrhoidectomy; hx tonsillectomy; and hx wisdom teeth extraction. FHX: family history is not on file. SHX:  reports that she has never smoked. She does not have any smokeless tobacco history on file. She reports that she does not drink alcohol and does not use drugs. ROS:    Review of Systems   Constitutional: Positive for malaise/fatigue. HENT: Negative. Eyes: Negative. Respiratory: Positive for shortness of breath. Cardiovascular: Negative. Gastrointestinal: Negative. Genitourinary: Negative. Musculoskeletal: Negative. Skin: Negative. Neurological: Negative. Psychiatric/Behavioral: Negative.          Objective:     Vital Signs: Telemetry atrial fibrillation Intake/Output:   Visit Vitals  BP (!) 176/83 (BP 1 Location: Right upper arm, BP Patient Position: At rest)   Pulse 81   Temp 98.5 °F (36.9 °C)   Resp 18   Ht 5' 4.5\" (1.638 m)   Wt 107.5 kg (236 lb 15.9 oz)   SpO2 95%   BMI 40.05 kg/m²       Temp (24hrs), Av.3 °F (36.8 °C), Min:97.6 °F (36.4 °C), Max:98.7 °F (37.1 °C)        O2 Device: None (Room air) O2 Flow Rate (L/min): 2 l/min       Wt Readings from Last 4 Encounters:   10/06/21 107.5 kg (236 lb 15.9 oz)   21 108.9 kg (240 lb) Intake/Output Summary (Last 24 hours) at 10/7/2021 0913  Last data filed at 10/7/2021 0656  Gross per 24 hour   Intake    Output 900 ml   Net -900 ml       Last shift:      No intake/output data recorded. Last 3 shifts: 10/05 1901 - 10/07 0700  In: -   Out: 900 [Urine:900]       Physical Exam:     Physical Exam  Constitutional:       Appearance: She is obese. HENT:      Head: Normocephalic and atraumatic. Nose: Nose normal.      Mouth/Throat:      Mouth: Mucous membranes are moist.   Eyes:      Pupils: Pupils are equal, round, and reactive to light. Cardiovascular:      Rate and Rhythm: Normal rate and regular rhythm. Pulses: Normal pulses. Heart sounds: Normal heart sounds. Pulmonary:      Effort: Pulmonary effort is normal.   Abdominal:      General: Abdomen is flat. Bowel sounds are normal.      Palpations: Abdomen is soft. Musculoskeletal:         General: Normal range of motion. Cervical back: Neck supple. Skin:     General: Skin is warm. Neurological:      General: No focal deficit present. Mental Status: She is alert. Psychiatric:         Mood and Affect: Mood normal.          Labs:    Recent Labs     10/07/21  0708 10/06/21  0430 10/05/21  1140   WBC 10.0 4.1 4.2   HGB 12.5 12.3 12.4    203 228     Recent Labs     10/07/21  0708 10/06/21  0430 10/05/21  1245    139 141   K 4.2 3.8 3.4*    101 102   CO2 35* 37* 37*   * 207* 101*   BUN 14 12 12   CREA 0.46* 0.43* 0.40*   CA 10.6* 10.0 9.8   ALB 3.3* 3.2* 3.2*   ALT 26 29 27     No results for input(s): PH, PCO2, PO2, HCO3, FIO2 in the last 72 hours.   Recent Labs     10/05/21  2020 10/05/21  1245   TROIQ <0.05 <0.05     No results found for: BNPP, BNP   Lab Results   Component Value Date/Time    Culture result: No growth after 6 hours 10/05/2021 11:41 PM    Culture result: No growth after 6 hours 10/05/2021 11:38 PM   No results found for: TSH, TSHEXT, TSHEXT    Imaging:    CXR Results  (Last 48 hours)               10/05/21 1050  XR CHEST PORT Final result    Narrative:  Chest single view. Comparison single view chest May 8, 2021. Hilar prominence, this may be exaggerated related to reduced lung volumes. Unable to exclude lymphadenopathy. No gross interstitial or alveolar pulmonary edema. No pneumothorax or sizable   pleural effusion. Consider optimal inspiratory PA and lateral view of the chest or    CECT chest.           Results from Hospital Encounter encounter on 10/05/21    XR CHEST PORT    Narrative  Chest single view. Comparison single view chest May 8, 2021. Hilar prominence, this may be exaggerated related to reduced lung volumes. Unable to exclude lymphadenopathy. No gross interstitial or alveolar pulmonary edema. No pneumothorax or sizable  pleural effusion. Consider optimal inspiratory PA and lateral view of the chest or  CECT chest.      Results from Hospital Encounter encounter on 05/08/21    XR CHEST SNGL V    Narrative  Chest single view. A single view of the chest is obtained. Lung volumes are within normal limits. The peripheral lungs are clear. Cardiac and mediastinal structures are within  normal limits. There is no pneumothorax or pleural effusion. Results from [place] encounter on 10/05/21    CTA CHEST W OR W WO CONT    Narrative  Shortness of breath. Comparison chest x-ray 10/5/2021. Chest CTA Technique: Axial chest with IV contrast. Multiplanar chest  reformatting and chest MIPs. Dose reduction: All CT scans at this facility are performed using dose reduction  optimization techniques as appropriate to a performed exam including the  following: Automated exposure control, adjustments of the mA and/or kV according  to patient's size, or use of iterative reconstruction technique. FINDINGS: Mild cardiomegaly. No pericardial effusion. Thoracic aorta without  aneurysm or dissection.  No large central pulmonary arterial filling defects. Main pulmonary artery is larger than the aorta. No mediastinal or hilar  adenopathy. Thoracic esophagus is collapsed. No pleural effusion. Bilateral  lower lung airspace disease. Included imaging of the upper abdomen demonstrates  dense material in the colon causing streak artifact. No axillary adenopathy. Included thyroid unremarkable. Degenerative changes about the bony structures. Impression  1. No large central pulmonary embolus. Main pulmonary artery is larger than the  aorta suggesting pulmonary arterial hypertension. 2. Cardiomegaly. 3. Bilateral lower lung atelectasis or infiltrates. IMPRESSION:   1. Dyspnea  2. Acute hypoxic respiratory failure  3. Personal history of COVID-19 pneumonia  4. Rule out obstructive sleep apnea  5. Chronic A. Fib  6. Hypertension hyperlipidemia  7. Pulmonary hypertension  8. Pt is requiring Drug therapy requiring intensive monitoring for toxicity  9. Pt is unstable, unpredictable needing inpatient monitoring; is acutely ill and at high risk of sudden decline and decompensation with severe consequenses and continued end organ dysfunction and failure  10. Prognosis guarded       RECOMMENDATIONS/PLAN:     1. Her dyspnea is multifactorial secondary to underlying chronic A. fib pulmonary hypertension also possible sleep apnea and also she had history of COVID-19 pneumonia  2. CAT scan of the chest negative for pulmonary embolism atelectasis basal no lung mass  3. 2D echo shows normal ejection fraction  4. Discontinue Solu-Medrol start patient on low-dose prednisone  5. She needs sleep study as an outpatient  6. Supplemental O2 to keep sats > 93%  7. Aspiration precautions  8. Labs to follow electrolytes, renal function and and blood counts  9. Glucose monitoring and SSI  10. Bronchial hygiene with respiratory therapy techniques, bronchodilators  11.  DVT, SUP prophylaxis               Adrian Lobo MD

## 2021-10-07 NOTE — PROGRESS NOTES
Progress Note    Patient: Jonathon Gomez MRN: [id number]  SSN: [id number]    YOB: 1950  Age: 66 y.o. Sex: female      Admit Date: 10/5/2021    LOS: 2 days     Subjective:     Patient seen and examined. Patient is followed for atrial fibrillation. She has a past medical history of hypertension, hyperlipidemia, and chronic atrial fibrillation. She was COVID positive in April. Condition remains the same no acute events noted overnight. Telemetry Review: Normal sinus rhythm; heart rate 80s, no ectopy. Review of Symptoms:   Review of Systems   Constitutional: Positive for malaise/fatigue. Cardiovascular: Positive for dyspnea on exertion. Negative for chest pain, irregular heartbeat and palpitations. Respiratory: Positive for shortness of breath. Negative for cough. Gastrointestinal: Positive for constipation. Negative for nausea and vomiting. Neurological: Negative. Objective:     Vitals:    10/07/21 0440 10/07/21 0748 10/07/21 0850 10/07/21 0856   BP: (!) 154/77 (!) 176/83     Pulse: 76 81     Resp:  18     Temp:  98.5 °F (36.9 °C)     SpO2: 98% 100% 98% 95%   Weight:       Height:            Intake and Output:  Current Shift: No intake/output data recorded. Last three shifts: 10/05 1901 - 10/07 0700  In: -   Out: 900 [Urine:900]    Physical Exam:   General: Well nourished well nourished, NAD, A&O  HEENT: Normocephalic, PERRL, no drainage  Neck: Supple, Trachea midline, No JVD  RESP: CTA bilaterally no wheezing, rhonchi, or rales. + Symmetrical chest movement. No SOB or distress. On room air. Cardiovascular: RRR no MRG  PVS: No rubor, cyanosis, no edema,   ABD: round, soft, NT, Normoactive BS  Derm: Warm/Dry/Intact with no lesions,   Neuro: A&O PPTS,  No focal deficits  PSYCH: No anxiety or agitation      Lab/Data Review: All lab results for the last 24 hours reviewed.          Current Facility-Administered Medications:     predniSONE (DELTASONE) tablet 10 mg, 10 mg, Oral, DAILY WITH BREAKFAST, Tino Caputo MD, 10 mg at 10/07/21 1351    amLODIPine (NORVASC) tablet 10 mg, 10 mg, Oral, DAILY, Beverly Cerna MD    apixaban (ELIQUIS) tablet 5 mg, 5 mg, Oral, BID, Beverly Cerna MD, 5 mg at 10/07/21 0858    chlorthalidone (HYGROTON) tablet 25 mg, 25 mg, Oral, DAILY, Neyda Soto MD, 25 mg at 10/07/21 0911    gabapentin (NEURONTIN) capsule 100 mg, 100 mg, Oral, QHS, Beverly Cerna MD, 100 mg at 10/06/21 2100    glipiZIDE (GLUCOTROL) tablet 5 mg, 5 mg, Oral, DAILY, Neyda Soto MD, 5 mg at 10/07/21 0858    magnesium oxide (MAG-OX) tablet 400 mg, 400 mg, Oral, DAILY, Neyda Soto MD, 400 mg at 10/07/21 0859    oxybutynin (DITROPAN) tablet 5 mg, 5 mg, Oral, DAILY, Neyda Soto MD, 5 mg at 10/07/21 0859    pravastatin (PRAVACHOL) tablet 80 mg, 80 mg, Oral, DAILY, Beverly Cerna MD, 80 mg at 10/07/21 0858    lisinopriL (PRINIVIL, ZESTRIL) tablet 40 mg, 40 mg, Oral, DAILY, Neyda Soto MD, 40 mg at 10/07/21 0859    albuterol-ipratropium (DUO-NEB) 2.5 MG-0.5 MG/3 ML, 3 mL, Nebulization, Q6H RT, Neyda Soto MD, 3 mL at 10/07/21 1332    insulin lispro (HUMALOG) injection, , SubCUTAneous, AC&HS, Neyda Soto MD, 4 Units at 10/07/21 1130    glucose chewable tablet 16 g, 4 Tablet, Oral, PRN, Beverly Cerna MD    dextrose (D50W) injection syrg 12.5-25 g, 25-50 mL, IntraVENous, PRN, Beverly Cerna MD    glucagon (GLUCAGEN) injection 1 mg, 1 mg, IntraMUSCular, PRN, Beverly Cerna MD    0.9% sodium chloride infusion, 25 mL/hr, IntraVENous, CONTINUOUS, Beverly Cerna MD, Last Rate: 25 mL/hr at 10/06/21 1125, 25 mL/hr at 10/06/21 1125    acetaminophen (TYLENOL) tablet 650 mg, 650 mg, Oral, Q6H PRN, 650 mg at 10/07/21 0443 **OR** acetaminophen (TYLENOL) suppository 650 mg, 650 mg, Rectal, Q6H PRN, Beverly Cerna MD    polyethylene glycol (MIRALAX) packet 17 g, 17 g, Oral, DAILY PRN, Rajinder Cerna MD    ondansetron (ZOFRAN ODT) tablet 4 mg, 4 mg, Oral, Q8H PRN **OR** ondansetron (ZOFRAN) injection 4 mg, 4 mg, IntraVENous, Q6H PRN, Beverly Cerna MD    enoxaparin (LOVENOX) injection 40 mg, 40 mg, SubCUTAneous, DAILY, Beverly Cerna MD, 40 mg at 10/07/21 0900    famotidine (PEPCID) tablet 40 mg, 40 mg, Oral, BID, Beverly Cerna MD, 40 mg at 10/07/21 4839      Assessment:     Active Problems:    Dyspnea (10/5/2021)      SOB (shortness of breath) (10/5/2021)        Plan:     Case discussed with Collaborating physician Dr. Gayatri Vega and our recommendations are as follows:     1. Chronic atrial fibrillation:   - Rate remains controlled. - Her CHADS2-VASc score is 4 ; continue Eliquis Continue telemetry monitoring. Keep serum potassium between 4-5 and serum magnesium > 2.   - Recommend outpatient appointment to establish care. - 2-D echo showed preserved EF with no wall motion abnormalities.     2. Hypertension:   - blood pressure above goal this am. Before am meds. - Continue to monitor.     3. Acute hypoxic respiratory failure:  - CTA negative for PE  - pulmonary on the case. Will continue to follow patient peripherally. Thank you for involving us in the care of this patient. Please do not hesitate to call if additional questions arise. If after hours please call [phone].     Caity Gonzales MD, ANTWAN [place]  Structural Heart Disease  Endovascular and Vascular Medicine  Interventional Cardiology  [facility]  [phone]    Signed By: Kishore Payan NP     October 7, 2021

## 2021-10-07 NOTE — PROGRESS NOTES
OCCUPATIONAL THERAPY EVALUATION  Patient: Melly Qureshi (48 y.o. female)  Date: 10/7/2021  Primary Diagnosis: Dyspnea [R06.00]  SOB (shortness of breath) [R06.02]        Precautions: Fall    ASSESSMENT  Patient is a 66year old female, who came to the ED with shortness of breath, pleuritic chest pain, tried an inhaler with no improvement and admitted for dyspnea and SOB on 10/05/2021. PMH includes: diabetes, HTN, high cholesterol, COVID-19 in April. Based on the objective data described below, the patient presents with SOB with activity, generalized deconditioning, decreased strength, impaired activity tolerance, oxygen saturation at or above 94% throughout session on room air, and increased need for A with self care and functional mobility/transfers. Patient semi supine in bed upon OT/PT arrival and agreeable to working with therapy. Patient A&O x4 and per pt report, pt lives with daughter since she got sick in April, in a single story home with 1 [address] and no handrails. Prior to that patient was living independently [place], [place] and was still driving, independent for all ADLs/IADLs. Patient reports being able to complete ADLs independently when feeling well and walking with a cane, did not use any AD prior to April. DME at daughter's house includes: cane, grab bars, and commode frame. Patient's daughter works from home ~1 day/week typically, otherwise patient is home alone. Patient SBA bed mobility and scooting, SBA with increased time sup -> sit. Patient total A LE dressing sitting EOB and reports unable to complete at this time. Patient CGA sit <> stand and ambulating to and from bathroom with gait belt and RW. Patient CGA toilet transfer and toileting. Patient then ambulated to sink and CGA grooming at sink to brush teeth. Patient then returned to room and CGA transfer to chair beside bed.  Patient currently on room air, noted SOB following ambulation to and from bathroom, oxygen at 94-95% when checked, pt reports 5/10 for perceived exertion to brush teeth at sink and walk to and from bathroom. Patient took several minutes to recover once sitting, pt and daughter educated on discharge options and wanting to discuss with family prior to making a decision, all questions answered at this time. Patient would benefit from continued skilled OT services to address above deficits and improve safety and independence with self care and functional mobility/transfers. Recommend discharge to IRF vs HHOT when medically appropriate. Current Level of Function Impacting Discharge (ADLs/self-care): CGA grooming at sink, total A LE dressing, CGA toileting. Other factors to consider for discharge: time since onset, PLOF, general deconditioning and SOB since pt was sick in April        PLAN :  Recommendations and Planned Interventions: self care training, functional mobility training, therapeutic exercise, balance training, therapeutic activities, endurance activities, patient education, home safety training and family training/education    Frequency/Duration: Patient will be followed by physical therapy:  3-5x/week to address goals. Recommendation for discharge: (in order for the patient to meet his/her long term goals)  [address] vs HHOT    This discharge recommendation:  Has been made in collaboration with the attending provider and/or case management    IF patient discharges home will need the following DME: patient owns DME required for discharge       SUBJECTIVE:   Patient stated I haven't felt like myself since I was sick in April.     OBJECTIVE DATA SUMMARY:   HISTORY:   Past Medical History:   Diagnosis Date    Diabetes ([address]     High cholesterol     Hypertension      Past Surgical History:   Procedure Laterality Date    HX HEMORRHOIDECTOMY      HX TONSILLECTOMY      HX WISDOM TEETH EXTRACTION         Expanded or extensive additional review of patient history:     [facility] Environment: Private residence  # Steps to Enter: 1  Rails to [employer]: No  One/Two Story Residence: One story  Living Alone: No (currently living with daughter)  Support Systems: Child(felice)  Patient Expects to be Discharged to[de-identified] Other (comment) (deciding between home or rehab facility)  Current DME Used/Available at Home: Grab bars, Cane, straight (commode frame)    PLOF: Pt I for ADLS, requiring A for IADLS, like cooking, since April, mod I with mobility prior to admission. EXAMINATION OF PERFORMANCE DEFICITS:  Cognitive/Behavioral Status:  Neurologic State: Alert  Orientation Level: Oriented X4  Cognition: Follows commands; Appropriate decision making  Safety/Judgement: Fall prevention;Home safety    Skin: intact where visible    Edema: none noted    Hearing: Auditory  Auditory Impairment: None    Vision/Perceptual:    No deficits reported at this time    Range of Motion:  AROM: Within functional limits    Strength:  Strength: Generally decreased, functional     RUE Strength  Observation: grossly observed to be 3+/5     LUE Strength  Observation: grossly observed to be 3+/5    Coordination:  Generally intact, not formally assessed    Tone & Sensation:  Tone: Normal    Balance:  Sitting: Intact; Without support  Standing: Impaired; With support  Standing - Static: Good  Standing - Dynamic : Fair;Occasional    Functional Mobility and Transfers for ADLs:  Bed Mobility:  Rolling: Stand-by assistance  Supine to Sit: Stand-by assistance; Additional time  Scooting: Stand-by assistance    Transfers:  Sit to Stand: Contact guard assistance  Stand to Sit: Contact guard assistance  Bed to Chair: Contact guard assistance  Bathroom Mobility: Contact guard assistance  Toilet Transfer : Contact guard assistance  Assistive Device : Gait Belt;Walker, rolling    ADL Assessment:    Oral Facial Hygiene/Grooming: Contact guard assistance    Lower Body Dressing:  Total assistance    Toileting: Contact guard assistance    ADL Intervention and task modifications:    Grooming  Grooming Assistance: Contact guard assistance  Position Performed: Standing  Brushing Teeth: Contact guard assistance    Lower Body Dressing Assistance  Dressing Assistance: Total assistance(dependent)  Socks: Total assistance (dependent)  Position Performed: Seated edge of bed    Toileting  Toileting Assistance: Contact guard assistance  Bladder Hygiene: Stand-by assistance  Clothing Management: Contact guard assistance    Cognitive Retraining  Safety/Judgement: Fall prevention;Home safety    Therapeutic Exercise:  Patient may benefit from UE HEP, initiate at next session as able. Functional Measure:    [address] [place] [address] AM-PACTM \"6 Clicks\"                                                       Daily Activity Inpatient Short Form  How much help from another person does the patient currently need. .. Total; A Lot A Little None   1. Putting on and taking off regular lower body clothing? [x]  1 []  2 []  3 []  4   2. Bathing (including washing, rinsing, drying)? []  1 [x]  2 []  3 []  4   3. Toileting, which includes using toilet, bedpan or urinal? [] 1 []  2 [x]  3 []  4   4. Putting on and taking off regular upper body clothing? []  1 []  2 [x]  3 []  4   5. Taking care of personal grooming such as brushing teeth? []  1 []  2 [x]  3 []  4   6. Eating meals? []  1 []  2 []  3 [x]  4   © 2007, Trustees of [address] [place] [address], under license to [employer]. All rights reserved     Score: 16/24     Interpretation of Tool:  Represents clinically-significant functional categories (i.e. Activities of daily living).   Percentage of Impairment CH    0%   CI    1-19% CJ    20-39% CK    40-59% CL    60-79% CM    80-99% CN     100%   [facility]  Score 6-24 24 23 20-22 15-19 10-14 7-9 6        Occupational Therapy Evaluation Charge Determination   History Examination Decision-Making   LOW Complexity : Brief history review  MEDIUM Complexity : 3-5 performance deficits relating to physical, cognitive , or psychosocial skils that result in activity limitations and / or participation restrictions LOW Complexity : No comorbidities that affect functional and no verbal or physical assistance needed to complete eval tasks       Based on the above components, the patient evaluation is determined to be of the following complexity level: LOW     Pain Ratin/10    Activity Tolerance:   Fair, SpO2 stable on RA, requires rest breaks and observed SOB with activity    After treatment patient left in no apparent distress:    Sitting in chair, Call bell within reach and Caregiver / family present    COMMUNICATION/EDUCATION:   The patients plan of care was discussed with: Physical therapist, Registered nurse and Case management. Home safety education was provided and the patient/caregiver indicated understanding., Patient/family have participated as able in goal setting and plan of care. and Patient/family agree to work toward stated goals and plan of care. This patients plan of care is appropriate for delegation to [facility].     OT/PT sessions occurred together for increased patient and clinician safety    Problem: Self Care Deficits Care Plan (Adult)  Goal: *Acute Goals and Plan of Care (Insert Text)  Description: Pt will be I sup <> sit in prep for EOB ADLs  Pt will be mod I grooming standing at sink  Pt will be mod I LE dressing sitting EOB/long sit  Pt will be mod I sit <>  prep for toileting LRAD  Pt will be mod I toileting/toilet transfer/cloth mgmt LRAD  Pt will be I following UE HEP in prep for self care tasks   Outcome: Not Met     Thank you for this referral.  Dangelo Lopez, OTR/L  Time Calculation: 33 mins

## 2021-10-07 NOTE — PROGRESS NOTES
Authorization has been started at [facility].  Auth Ref# [id number].

## 2021-10-07 NOTE — PROGRESS NOTES
*ATTENTION:  This note has been created by a medical student for educational purposes only. Please do not refer to the content of this note for clinical decision-making, billing, or other purposes. Please see attending physicians note to obtain clinical information on this patient. *      Hospitalist Progress Note    Subjective:   Daily Progress Note: 10/7/2021 11:58 AM    Patient is T 25 y. o. year old female past medical history of diabetes hypertension hyperlipidemia chronic A. fib came to emergency room because of shortness of breath and dyspnea according the patient patient have Covid 19 in April since then patient feeling weak tired seen in the ER 3 times but recently getting more worse came to the emergency room seen by the ER physician    10/7  Patient seen today resting in recliner. She appeared fatigued. She admits to dyspnea on exertion, orthopnea, as well as constipation with her last bowel movement being on sunday. She denies chest pain, fever, abdominal pain. Echocardiogram: Summary  · LV: Estimated LVEF is 60 - 65%. Normal cavity size, wall thickness and systolic function (ejection fraction normal). · Image quality for this study was technically difficult.       Current Facility-Administered Medications   Medication Dose Route Frequency    predniSONE (DELTASONE) tablet 10 mg  10 mg Oral DAILY WITH BREAKFAST    amLODIPine (NORVASC) tablet 10 mg  10 mg Oral DAILY    apixaban (ELIQUIS) tablet 5 mg  5 mg Oral BID    chlorthalidone (HYGROTON) tablet 25 mg  25 mg Oral DAILY    gabapentin (NEURONTIN) capsule 100 mg  100 mg Oral QHS    glipiZIDE (GLUCOTROL) tablet 5 mg  5 mg Oral DAILY    magnesium oxide (MAG-OX) tablet 400 mg  400 mg Oral DAILY    oxybutynin (DITROPAN) tablet 5 mg  5 mg Oral DAILY    pravastatin (PRAVACHOL) tablet 80 mg  80 mg Oral DAILY    lisinopriL (PRINIVIL, ZESTRIL) tablet 40 mg  40 mg Oral DAILY    albuterol-ipratropium (DUO-NEB) 2.5 MG-0.5 MG/3 ML  3 mL Nebulization Q6H RT    insulin lispro (HUMALOG) injection   SubCUTAneous AC&HS    glucose chewable tablet 16 g  4 Tablet Oral PRN    dextrose (D50W) injection syrg 12.5-25 g  25-50 mL IntraVENous PRN    glucagon (GLUCAGEN) injection 1 mg  1 mg IntraMUSCular PRN    0.9% sodium chloride infusion  25 mL/hr IntraVENous CONTINUOUS    acetaminophen (TYLENOL) tablet 650 mg  650 mg Oral Q6H PRN    Or    acetaminophen (TYLENOL) suppository 650 mg  650 mg Rectal Q6H PRN    polyethylene glycol (MIRALAX) packet 17 g  17 g Oral DAILY PRN    ondansetron (ZOFRAN ODT) tablet 4 mg  4 mg Oral Q8H PRN    Or    ondansetron (ZOFRAN) injection 4 mg  4 mg IntraVENous Q6H PRN    enoxaparin (LOVENOX) injection 40 mg  40 mg SubCUTAneous DAILY    famotidine (PEPCID) tablet 40 mg  40 mg Oral BID            Objective:     Visit Vitals  BP (!) 176/83 (BP 1 Location: Right upper arm, BP Patient Position: At rest)   Pulse 81   Temp 98.5 °F (36.9 °C)   Resp 18   Ht 5' 4.5\" (1.638 m)   Wt 236 lb 15.9 oz (107.5 kg)   SpO2 95%   BMI 40.05 kg/m²    O2 Flow Rate (L/min): 2 l/min O2 Device: None (Room air)    Temp (24hrs), Av.3 °F (36.8 °C), Min:97.6 °F (36.4 °C), Max:98.7 °F (37.1 °C)      No intake/output data recorded. 10/05 1901 - 10/07 0700  In: -   Out: 900 [Urine:900]    Physical Exam:  Constitutional: Alert and awke  HENT:  Head: Normocephalic and atraumatic. Eyes: Pupils are equal, round, and reactive to light. EOM are normal.   Cardiovascular: Normal rate, regular rhythm and normal heart sounds. Pulmonary/Chest: Breath sounds normal. No wheezes. No rales. Exhibits no tenderness. Abdominal: Soft. Bowel sounds are normal. There is no abdominal tenderness. There is no rebound and no guarding. Musculoskeletal: Normal range of motion. Neurological: apporiate mood and judgement   Skin:       Data Review    24 hrs labs reviewed. Echocardiogram: Summary  · LV: Estimated LVEF is 60 - 65%.  Normal cavity size, wall thickness and systolic function (ejection fraction normal). · Image quality for this study was technically difficult.         Recent Results (from the past 24 hour(s))   GLUCOSE, POC    Collection Time: 10/06/21  2:57 PM   Result Value Ref Range    Glucose (POC) 194 (H) 65 - 117 mg/dL    Performed by Lawrence Guerrero (PCT)    ECHO ADULT COMPLETE    Collection Time: 10/06/21  4:01 PM   Result Value Ref Range    Aortic Valve Systolic Peak Velocity 813.21 cm/s    AoV PG 4.00 mmHg    Ao Root D 3.10 cm    IVSd 2.23 (A) 0.60 - 0.90 cm    LVIDd 3.13 (A) 3.90 - 5.30 cm    LVIDs 2.06 cm    LVOT Peak Velocity 90.70 cm/s    LVOT Peak Gradient 3.00 mmHg    LVPWd 1.09 (A) 0.60 - 0.90 cm    LV E' Septal Velocity 5.01 cm/s    LV ED Vol A2C 30.70 cm3    LV ES Vol A2C 8.74 cm3    E/E' septal 17.76     Left Atrium Major Axis 2.60 cm    Mitral Valve Deceleration [place] 6,480.00 mm/s2    Mitral Valve Deceleration [place] 6,480.00 mm/s2    Mitral Valve E Wave Deceleration Time 136.00 ms    Mitral Valve Pressure Half-time 40.00 ms    MV A Marvin 151.00 cm/s    MV E Marvin 89.00 cm/s    MVA (PHT) 5.50 cm2    MV E/A 0.59     Pulmonic Valve Max Velocity 116.00 cm/s    Pulmonic Valve Systolic Peak Instantaneous Gradient 5.00 mmHg    Est. RA Pressure 3.00 mmHg    RVIDd 3.05 cm    RVSP 35.00 mmHg    TR Max Velocity 282.00 cm/s    TV MG 32.00 mmHg    BP EF 64.7 55.0 - 100.0 %    LV Mass .3 67.0 - 162.0 g    LV Mass AL Index 94.0 43.0 - 95.0 g/m2    Left Atrium Minor Axis 1.23 cm   GLUCOSE, POC    Collection Time: 10/06/21  7:18 PM   Result Value Ref Range    Glucose (POC) 176 (H) 65 - 117 mg/dL    Performed by Yoel Lucero    CBC W/O DIFF    Collection Time: 10/07/21  7:08 AM   Result Value Ref Range    WBC 10.0 3.6 - 11.0 K/uL    RBC 4.47 3.80 - 5.20 M/uL    HGB 12.5 11.5 - 16.0 g/dL    HCT 40.5 35.0 - 47.0 %    MCV 90.6 80.0 - 99.0 FL    MCH 28.0 26.0 - 34.0 PG    MCHC 30.9 30.0 - 36.5 g/dL    RDW 11.9 11.5 - 14.5 %    PLATELET 534 039 - 689 K/uL    MPV 10.7 8.9 - 12.9 FL    NRBC 0.0 0.0  WBC    ABSOLUTE NRBC 0.00 0.00 - 1.29 K/uL   METABOLIC PANEL, COMPREHENSIVE    Collection Time: 10/07/21  7:08 AM   Result Value Ref Range    Sodium 140 136 - 145 mmol/L    Potassium 4.2 3.5 - 5.1 mmol/L    Chloride 103 97 - 108 mmol/L    CO2 35 (H) 21 - 32 mmol/L    Anion gap 2 (L) 5 - 15 mmol/L    Glucose 208 (H) 65 - 100 mg/dL    BUN 14 6 - 20 mg/dL    Creatinine 0.46 (L) 0.55 - 1.02 mg/dL    BUN/Creatinine ratio 30 (H) 12 - 20      GFR est AA >60 >60 ml/min/1.73m2    GFR est non-AA >60 >60 ml/min/1.73m2    Calcium 10.6 (H) 8.5 - 10.1 mg/dL    Bilirubin, total 0.3 0.2 - 1.0 mg/dL    AST (SGOT) 17 15 - 37 U/L    ALT (SGPT) 26 12 - 78 U/L    Alk. phosphatase 90 45 - 117 U/L    Protein, total 6.5 6.4 - 8.2 g/dL    Albumin 3.3 (L) 3.5 - 5.0 g/dL    Globulin 3.2 2.0 - 4.0 g/dL    A-G Ratio 1.0 (L) 1.1 - 2.2     GLUCOSE, POC    Collection Time: 10/07/21  7:44 AM   Result Value Ref Range    Glucose (POC) 174 (H) 65 - 117 mg/dL    Performed by Sukhdeep ARREDONDO, POC    Collection Time: 10/07/21 11:02 AM   Result Value Ref Range    Glucose (POC) 240 (H) 65 - 117 mg/dL    Performed by Rock Johnson          Assessment/Plan:     Acute dyspnea  History of COVID-19  Type 2 diabetes  Hypertension  Hyperlipidemia  History of A. Fib  Pulmonary hypertension        PLAN    Per Pulm:   -Discontinue Solu-Medrol, start patient on low-dose prednisone  -sleep study as an outpatient    Per cardiology:  -Chronic atrial fibrillation:   - Rate controlled. Will add Cardizem if rate is uncontrolled. Her CHADS2-VASc score is 4 ; continue Eliquis Continue telemetry monitoring.  Keep serum potassium between 4-5 and serum magnesium > 2.   - Recommend outpatient appointment to establish care    Continue following medications  -Give Miralax for constipation    Current Facility-Administered Medications:     predniSONE (DELTASONE) tablet 10 mg, 10 mg, Oral, DAILY WITH BREAKFAST, Taniya Newsome MD  [place] amLODIPine (NORVASC) tablet 10 mg, 10 mg, Oral, DAILY, Beverly Cerna MD    apixaban (ELIQUIS) tablet 5 mg, 5 mg, Oral, BID, Beverly Cerna MD, 5 mg at 10/07/21 0858    chlorthalidone (HYGROTON) tablet 25 mg, 25 mg, Oral, DAILY, Lavonne Frankel, MD, 25 mg at 10/07/21 0911    gabapentin (NEURONTIN) capsule 100 mg, 100 mg, Oral, QHS, Beverly Cerna MD, 100 mg at 10/06/21 2100    glipiZIDE (GLUCOTROL) tablet 5 mg, 5 mg, Oral, DAILY, Lavonne Frankel, MD, 5 mg at 10/07/21 0858    magnesium oxide (MAG-OX) tablet 400 mg, 400 mg, Oral, DAILY, Lavonne Frankel, MD, 400 mg at 10/07/21 0859    oxybutynin (DITROPAN) tablet 5 mg, 5 mg, Oral, DAILY, Lavonne Frankel, MD, 5 mg at 10/07/21 0859    pravastatin (PRAVACHOL) tablet 80 mg, 80 mg, Oral, DAILY, Beverly Cerna MD, 80 mg at 10/07/21 0858    lisinopriL (PRINIVIL, ZESTRIL) tablet 40 mg, 40 mg, Oral, DAILY, Lavonne Frankel, MD, 40 mg at 10/07/21 0859    albuterol-ipratropium (DUO-NEB) 2.5 MG-0.5 MG/3 ML, 3 mL, Nebulization, Q6H RT, Lavonne Frankel, MD, 3 mL at 10/07/21 0850    insulin lispro (HUMALOG) injection, , SubCUTAneous, AC&HS, Lavonne Frankel, MD, 3 Units at 10/07/21 0856    glucose chewable tablet 16 g, 4 Tablet, Oral, PRN, Mini Cerna MD    dextrose (D50W) injection syrg 12.5-25 g, 25-50 mL, IntraVENous, PRN, Mini Cerna MD    glucagon (GLUCAGEN) injection 1 mg, 1 mg, IntraMUSCular, PRN, Lavonne Frankel, MD    0.9% sodium chloride infusion, 25 mL/hr, IntraVENous, CONTINUOUS, Beverly Cerna MD, Last Rate: 25 mL/hr at 10/06/21 1125, 25 mL/hr at 10/06/21 1125    acetaminophen (TYLENOL) tablet 650 mg, 650 mg, Oral, Q6H PRN, 650 mg at 10/07/21 0443 **OR** acetaminophen (TYLENOL) suppository 650 mg, 650 mg, Rectal, Q6H PRN, Beverly Cerna MD    polyethylene glycol (MIRALAX) packet 17 g, 17 g, Oral, DAILY PRN, Beverly Cerna MD    ondansetron (ZOFRAN ODT) tablet 4 mg, 4 mg, Oral, Q8H PRN **OR** ondansetron (Donelda Merced) injection 4 mg, 4 mg, IntraVENous, Q6H PRN, Beverly Cerna MD    enoxaparin (LOVENOX) injection 40 mg, 40 mg, SubCUTAneous, DAILY, Beverly Cerna MD, 40 mg at 10/07/21 0900    famotidine (PEPCID) tablet 40 mg, 40 mg, Oral, BID, Beverly Cerna MD, 40 mg at 10/07/21 1513

## 2021-10-07 NOTE — PROGRESS NOTES
PHYSICAL THERAPY EVALUATION  Patient: Jeanna Morales (21 y.o. female)  Date: 10/7/2021  Primary Diagnosis: Dyspnea [R06.00]  SOB (shortness of breath) [R06.02]        Precautions: Fall    ASSESSMENT  Patient is a 66year old female, who came to the ED with shortness of breath, pleuritic chest pain, tried an inhaler with no improvement and admitted for dyspnea and SOB on 10/05/2021. PMH includes: diabetes, HTN, high cholesterol, COVID-19 in April.     Based on the objective data described below, the patient presents with SOB with activity, generalized deconditioning, decreased strength, impaired activity tolerance, oxygen saturation at or above 94% throughout session on room air, and increased need for A with amb and functional mobility/transfers. Patient semi supine in bed upon PT/OT arrival and agreeable to working with therapy. Patient A&O x4 and per pt report, pt lives with daughter since she got sick in April, in a single story home with 1 ROSA ELENA and no handrails. Prior to that patient was living independently [place], [place] and was still driving, independent for all ADLs/IADLs. Patient reports being able to complete ADLs independently when feeling well and walking with a cane, did not use any AD prior to April. DME at daughter's house includes: cane, grab bars, and commode frame. Patient's daughter works from home ~1 day/week typically, otherwise patient is home alone.      Patient SBA bed mobility and scooting, SBA with increased time sup -> sit. Patient CGA sit <> stand transfers from EOB and toilet. Pt amb 30 feet to and from bathroom with close CGA, gait belt and RW; demonstrates very slow, cautious, shuffling gt pattern with no LOB or knee buckling noted. Patient then returned to room and CGA transfer to chair beside bed.  Patient currently on room air, noted SOB following ambulation to and from bathroom, oxygen at 94-95% when checked, pt reports 5/10 for perceived exertion following self care in bathroom with OT (see OT note for details). Patient took several minutes to recover once sitting. Pt and daughter educated on discharge options and wanting to discuss with family prior to making a decision, all questions answered at this time. Patient would benefit from continued skilled PT services to address above deficits and improve safety and independence with amb and functional mobility/transfers. Recommend discharge to IRF vs HHPT when medically appropriate. Current Level of Function Impacting Discharge (mobility/balance): CGA with additional time and rest breaks    Other factors to consider for discharge: severity of deficit      PLAN :  Recommendations and Planned Interventions: bed mobility training, transfer training, gait training, therapeutic exercises, neuromuscular re-education, patient and family training/education and therapeutic activities      Recommend with staff: amb with gt belt and RW    Frequency/Duration: Patient will be followed by physical therapy:  3-5x/week to address goals. Recommendation for discharge: (in order for the patient to meet his/her long term goals)  [facility]     This discharge recommendation:  Has been made in collaboration with the attending provider and/or case management    IF patient discharges home will need the following DME: none         SUBJECTIVE:   Patient stated Raad Pugh got the flu, PNA, and COVID back to back to back. I still haven't been able to recover.     OBJECTIVE DATA SUMMARY:   HISTORY:    Past Medical History:   Diagnosis Date    Diabetes ([address]     High cholesterol     Hypertension      Past Surgical History:   Procedure Laterality Date    HX HEMORRHOIDECTOMY      HX TONSILLECTOMY      HX WISDOM TEETH EXTRACTION         Home Situation  Home Environment: Private residence  # Steps to Enter: 1  Rails to Enter: No  One/Two Story Residence: One story  Living Alone: No (currently living with daughter)  Support Systems: Child(felice)  Patient Expects to be Discharged to[de-identified] Other (comment) (deciding between home or rehab facility)  Current DME Used/Available at Home: Grab bars, Cane, straight (commode frame)    EXAMINATION/PRESENTATION/DECISION MAKING:   Critical Behavior:  Neurologic State: Alert  Orientation Level: Oriented X4  Cognition: Follows commands, Appropriate decision making  Safety/Judgement: Fall prevention, Home safety  Hearing: Auditory  Auditory Impairment: None  Skin:  Intact where visible  Edema: none noted   Range Of Motion:  AROM: Within functional limits                       Strength:    Strength: Generally decreased, functional                    Tone & Sensation:   Tone: Normal                   Functional Mobility:  Bed Mobility:  Rolling: Stand-by assistance  Supine to Sit: Stand-by assistance; Additional time     Scooting: Stand-by assistance  Transfers:  Sit to Stand: Contact guard assistance  Stand to Sit: Contact guard assistance        Bed to Chair: Contact guard assistance              Balance:   Sitting: Intact; Without support  Standing: Impaired; With support  Standing - Static: Good  Standing - Dynamic : Fair;Occasional  Ambulation/Gait Training:  Distance (ft): 30 Feet (ft)  Assistive Device: Gait belt  Ambulation - Level of Assistance: Contact guard assistance; Additional time     Gait Description (WDL): Exceptions to WDL  Gait Abnormalities: Shuffling gait        Base of Support: Widened     Speed/Gely: Shuffled; Slow             Therapeutic Exercises:   Not completed this session    Functional Measure:  [address] [facility] [address] [address] AM-PAC 6 Clicks         Basic Mobility Inpatient Short Form  How much difficulty does the patient currently have. .. Unable A Lot A Little None   1. Turning over in bed (including adjusting bedclothes, sheets and blankets)? [] 1   [] 2   [x] 3   [] 4   2. Sitting down on and standing up from a chair with arms ( e.g., wheelchair, bedside commode, etc.)   [] 1   [] 2   [x] 3   [] 4   3.   Moving from lying on back to sitting on the side of the bed? [] 1   [] 2   [x] 3   [] 4          How much help from another person does the patient currently need. .. Total A Lot A Little None   4. Moving to and from a bed to a chair (including a wheelchair)? [] 1   [] 2   [x] 3   [] 4   5. Need to walk in hospital room? [] 1   [] 2   [x] 3   [] 4   6. Climbing 3-5 steps with a railing? [] 1   [x] 2   [] 3   [] 4   © , Trustees of [facility] [employer], under license to [employer]. All rights reserved     Score:  Initial:  Most Recent: X (Date: 10/7/21 )   Interpretation of Tool:  Represents activities that are increasingly more difficult (i.e. Bed mobility, Transfers, Gait). Score 24 23 22-20 19-15 14-10 9-7 6   Modifier CH CI CJ CK CL CM CN         Physical Therapy Evaluation Charge Determination   History Examination Presentation Decision-Making   HIGH Complexity :3+ comorbidities / personal factors will impact the outcome/ POC  HIGH Complexity : 4+ Standardized tests and measures addressing body structure, function, activity limitation and / or participation in recreation  LOW Complexity : Stable, uncomplicated  Other outcome measures ampac 6  mod      Based on the above components, the patient evaluation is determined to be of the following complexity level: MEDIUM    Pain Ratin/10 reported    Activity Tolerance:   Fair, SpO2 stable on RA, requires rest breaks and observed SOB with activity    After treatment patient left in no apparent distress:   Sitting in chair, Call bell within reach, and Caregiver / family present and nsg updated. GOALS:    Problem: Mobility Impaired (Adult and Pediatric)  Goal: *Acute Goals and Plan of Care (Insert Text)  Description: Pt will be I with LE HEP in 7 days. Pt will perform bed mobility with mod I in 7 days. Pt will perform transfers with mod I in 7 days. Pt will amb  feet with LRAD safely with mod I in 7 days.        Outcome: Not Met COMMUNICATION/EDUCATION:   The patients plan of care was discussed with: Occupational therapist, Registered nurse, and Case management. Fall prevention education was provided and the patient/caregiver indicated understanding., Patient/family have participated as able in goal setting and plan of care. , and Patient/family agree to work toward stated goals and plan of care. PT/OT sessions occurred together for increased safety of pt and clinician.        Thank you for this referral.  Paris Feldman, PT, DPT   Time Calculation: 33 mins

## 2021-10-08 LAB
GLUCOSE BLD STRIP.AUTO-MCNC: 115 MG/DL (ref 65–117)
GLUCOSE BLD STRIP.AUTO-MCNC: 129 MG/DL (ref 65–117)
GLUCOSE BLD STRIP.AUTO-MCNC: 129 MG/DL (ref 65–117)
GLUCOSE BLD STRIP.AUTO-MCNC: 191 MG/DL (ref 65–117)
PERFORMED BY, TECHID: ABNORMAL
PERFORMED BY, TECHID: NORMAL

## 2021-10-08 PROCEDURE — 65270000029 HC RM PRIVATE

## 2021-10-08 PROCEDURE — 94760 N-INVAS EAR/PLS OXIMETRY 1: CPT

## 2021-10-08 PROCEDURE — 74011000250 HC RX REV CODE- 250: Performed by: FAMILY MEDICINE

## 2021-10-08 PROCEDURE — 97116 GAIT TRAINING THERAPY: CPT

## 2021-10-08 PROCEDURE — 94640 AIRWAY INHALATION TREATMENT: CPT

## 2021-10-08 PROCEDURE — 74011636637 HC RX REV CODE- 636/637: Performed by: FAMILY MEDICINE

## 2021-10-08 PROCEDURE — 74011250636 HC RX REV CODE- 250/636: Performed by: FAMILY MEDICINE

## 2021-10-08 PROCEDURE — 82962 GLUCOSE BLOOD TEST: CPT

## 2021-10-08 PROCEDURE — 74011250637 HC RX REV CODE- 250/637: Performed by: FAMILY MEDICINE

## 2021-10-08 PROCEDURE — 74011250637 HC RX REV CODE- 250/637: Performed by: INTERNAL MEDICINE

## 2021-10-08 PROCEDURE — 74011636637 HC RX REV CODE- 636/637: Performed by: INTERNAL MEDICINE

## 2021-10-08 PROCEDURE — 97530 THERAPEUTIC ACTIVITIES: CPT

## 2021-10-08 RX ORDER — PREDNISONE 10 MG/1
10 TABLET ORAL
Qty: 5 TABLET | Refills: 0 | Status: SHIPPED | OUTPATIENT
Start: 2021-10-09 | End: 2022-02-19 | Stop reason: SINTOL

## 2021-10-08 RX ORDER — BUTALBITAL, ACETAMINOPHEN AND CAFFEINE 50; 325; 40 MG/1; MG/1; MG/1
2 TABLET ORAL ONCE
Status: COMPLETED | OUTPATIENT
Start: 2021-10-08 | End: 2021-10-08

## 2021-10-08 RX ORDER — DILTIAZEM HYDROCHLORIDE 120 MG/1
120 CAPSULE, COATED, EXTENDED RELEASE ORAL DAILY
Status: DISCONTINUED | OUTPATIENT
Start: 2021-10-08 | End: 2021-10-11 | Stop reason: HOSPADM

## 2021-10-08 RX ORDER — FAMOTIDINE 40 MG/1
40 TABLET, FILM COATED ORAL 2 TIMES DAILY
Qty: 60 TABLET | Refills: 0 | Status: SHIPPED | OUTPATIENT
Start: 2021-10-08

## 2021-10-08 RX ORDER — DILTIAZEM HYDROCHLORIDE 120 MG/1
120 CAPSULE, COATED, EXTENDED RELEASE ORAL DAILY
Qty: 30 CAPSULE | Refills: 1 | Status: SHIPPED | OUTPATIENT
Start: 2021-10-09

## 2021-10-08 RX ADMIN — IPRATROPIUM BROMIDE AND ALBUTEROL SULFATE 3 ML: .5; 2.5 SOLUTION RESPIRATORY (INHALATION) at 13:06

## 2021-10-08 RX ADMIN — GLIPIZIDE 5 MG: 5 TABLET ORAL at 09:37

## 2021-10-08 RX ADMIN — IPRATROPIUM BROMIDE AND ALBUTEROL SULFATE 3 ML: .5; 2.5 SOLUTION RESPIRATORY (INHALATION) at 07:34

## 2021-10-08 RX ADMIN — POLYETHYLENE GLYCOL 3350 17 G: 17 POWDER, FOR SOLUTION ORAL at 12:47

## 2021-10-08 RX ADMIN — PRAVASTATIN SODIUM 80 MG: 20 TABLET ORAL at 09:38

## 2021-10-08 RX ADMIN — IPRATROPIUM BROMIDE AND ALBUTEROL SULFATE 3 ML: .5; 2.5 SOLUTION RESPIRATORY (INHALATION) at 19:17

## 2021-10-08 RX ADMIN — FAMOTIDINE 40 MG: 20 TABLET ORAL at 21:57

## 2021-10-08 RX ADMIN — IPRATROPIUM BROMIDE AND ALBUTEROL SULFATE 3 ML: .5; 2.5 SOLUTION RESPIRATORY (INHALATION) at 01:09

## 2021-10-08 RX ADMIN — BUTALBITAL, ACETAMINOPHEN, AND CAFFEINE 2 TABLET: 50; 325; 40 TABLET ORAL at 03:54

## 2021-10-08 RX ADMIN — GABAPENTIN 100 MG: 100 CAPSULE ORAL at 21:58

## 2021-10-08 RX ADMIN — APIXABAN 5 MG: 5 TABLET, FILM COATED ORAL at 21:58

## 2021-10-08 RX ADMIN — AMLODIPINE BESYLATE 10 MG: 5 TABLET ORAL at 21:57

## 2021-10-08 RX ADMIN — CHLORTHALIDONE 25 MG: 25 TABLET ORAL at 09:39

## 2021-10-08 RX ADMIN — OXYBUTYNIN CHLORIDE 5 MG: 5 TABLET ORAL at 09:37

## 2021-10-08 RX ADMIN — SODIUM CHLORIDE 25 ML/HR: 9 INJECTION, SOLUTION INTRAVENOUS at 12:46

## 2021-10-08 RX ADMIN — INSULIN LISPRO 3 UNITS: 100 INJECTION, SOLUTION INTRAVENOUS; SUBCUTANEOUS at 17:38

## 2021-10-08 RX ADMIN — LISINOPRIL 40 MG: 40 TABLET ORAL at 09:37

## 2021-10-08 RX ADMIN — Medication 400 MG: at 09:37

## 2021-10-08 RX ADMIN — DILTIAZEM HYDROCHLORIDE 120 MG: 120 CAPSULE, COATED, EXTENDED RELEASE ORAL at 09:37

## 2021-10-08 RX ADMIN — FAMOTIDINE 40 MG: 20 TABLET ORAL at 09:39

## 2021-10-08 RX ADMIN — APIXABAN 5 MG: 5 TABLET, FILM COATED ORAL at 09:38

## 2021-10-08 RX ADMIN — PREDNISONE 10 MG: 5 TABLET ORAL at 09:39

## 2021-10-08 NOTE — PROGRESS NOTES
Problem: Self Care Deficits Care Plan (Adult)  Goal: *Acute Goals and Plan of Care (Insert Text)  Description: Pt will be I sup <> sit in prep for EOB ADLs  Pt will be mod I grooming standing at sink  Pt will be mod I LE dressing sitting EOB/long sit  Pt will be mod I sit <>  prep for toileting LRAD  Pt will be mod I toileting/toilet transfer/cloth mgmt LRAD  Pt will be I following UE HEP in prep for self care tasks   Outcome: Progressing Towards Goal   OCCUPATIONAL THERAPY TREATMENT  Patient: Mary Kay Raza (66 y.o. female)  Date: 10/8/2021  Diagnosis: Dyspnea [R06.00]  SOB (shortness of breath) [R06.02] <principal problem not specified>       Precautions: Fall  Chart, occupational therapy assessment, plan of care, and goals were reviewed. ASSESSMENT  Patient continues with skilled OT services and is progressing towards goals. Patient received seated in chair upon GARCIA'S arrival and agreeable to work with therapist. TA for ruben socks 2/2 decreased anterior reach, SOB and decrease endurance. STS and bed <-> bathroom sink tf using Rw/gait belt with CGA to SBA. Patient required CGA to SBA for washing hands at sink with no LOB noted. Slow gait 2/2 decreased endurance and decrease activity tolerance. Seated in chair, patient educated on and completed bilateral UE HEP to increase strength/endurance needed for ADL'S and functional transfers, see grid below. Chest level exercises increase SOB. Pt instructed on PLB for improved breathing. Pt left resting comfortably with daughter. Patient would benefit from continued skilled Occupational services while at [facility] in order to increase safety and independence with self care and functional tranfers/mobility. Recommend at discharge to IRF when medically appropriate.      Current Level of Function Impacting Discharge (ADLs): TA for LB dressing and CGA to SBA for grooming in standing    Other factors to consider for discharge: Time of onset, medical prognosis/diagnosis, severity of deficits, PLOF, home environment, and family support          PLAN :  Patient continues to benefit from skilled intervention to address the above impairments. Continue treatment per established plan of care. to address goals. Recommend next OT session: activity tolerance    Recommendation for discharge: (in order for the patient to meet his/her long term goals)  Inpatient Rehabilitation Facility     This discharge recommendation:  Has been made in collaboration with the attending provider and/or case management    IF patient discharges home will need the following DME: TBD       SUBJECTIVE:   Patient stated I feel weak at times.     OBJECTIVE DATA SUMMARY:   Cognitive/Behavioral Status:  Neurologic State: Alert  Orientation Level: Oriented X4  Cognition: Appropriate for age attention/concentration             Functional Mobility and Transfers for ADLs:  Bed Mobility:       Transfers:  Sit to Stand: Contact guard assistance;Stand-by assistance  Functional Transfers  Bathroom Mobility: Contact guard assistance;Stand-by assistance       Balance:       ADL Intervention:       Grooming  Grooming Assistance: Contact guard assistance;Stand-by assistance  Position Performed: Standing  Washing Hands: Contact guard assistance;Stand-by assistance       Lower Body Dressing Assistance  Dressing Assistance: Total assistance(dependent)  Socks:  Total assistance (dependent)  Leg Crossed Method Used: Yes  Position Performed: Seated in chair  Cues: Verbal cues provided              Therapeutic Exercises:   Exercise Sets Reps AROM AAROM PROM Self PROM Comments   Shoulder flex/ext 2 10 [x] [] [] [] Seated in chair, 1/2lb wt vc's to pace self   Elbow flex/ext 2 10 [x] [] [] []    Chest press 2 10 [x] [] [] []    Ceiling punches 2 10 [x] [] [] []         Pain:  0/10    Activity Tolerance:   Fair and requires rest breaks  Please refer to the flowsheet for vital signs taken during this treatment. After treatment patient left in no apparent distress:   Sitting in chair, Call bell within reach, and Caregiver / family present    COMMUNICATION/COLLABORATION:   The patients plan of care was discussed with: Registered nurse.      RADHA Campbell  Time Calculation: 24 mins

## 2021-10-08 NOTE — PROGRESS NOTES
CM spoke to liaison from [facility], Patient is still pending authorization. CM will continue to follow up.

## 2021-10-08 NOTE — PROGRESS NOTES
PHYSICAL THERAPY TREATMENT  Patient: Aaron Gage (6 y.o. female)  Date: 10/8/2021  Diagnosis: Dyspnea [R06.00]  SOB (shortness of breath) [R06.02] <principal problem not specified>       Precautions: Fall  Chart, physical therapy assessment, plan of care and goals were reviewed. ASSESSMENT  Patient continues with skilled PT services and is progressing towards goals. Patient improved with PT today and was able to perform transfers with min A from the chair and commode today. Patient was CGA for stand>sit and ambulation. Patient amb approx 10 ft approx to bathroom then another 30 ft with RW and no LOB noted. Amb with slow, shuffled gait with fatigue noted. Tolerated LE therex well in the chair and remains motivated for PT session and to improve towards baseline function. Rec IRF at discharge from hospital. Will cont to progress towards goals. Current Level of Function Impacting Discharge (mobility/balance): weakness, unsteady mobility     Other factors to consider for discharge: weakness, fatigue         PLAN :  Patient continues to benefit from skilled intervention to address the above impairments. Continue treatment per established plan of care. to address goals.     Recommendation for discharge: (in order for the patient to meet his/her long term goals)  [facility]     This discharge recommendation:  Has been made in collaboration with the attending provider and/or case management    IF patient discharges home will need the following DME: to be determined (TBD)       SUBJECTIVE:   Patient stated I am just weak    OBJECTIVE DATA SUMMARY:   Critical Behavior:  Neurologic State: Alert  Orientation Level: Oriented X4  Cognition: Appropriate for age attention/concentration  Safety/Judgement: Fall prevention, Home safety  Functional Mobility Training:  Bed Mobility:  Up in chair upon arrival    Transfers:  Sit to Stand: Minimum assistance  Stand to Sit: Contact guard assistance    Balance:  Good with support    Ambulation/Gait Training:  Distance (ft): 40 Feet (ft) (10 plus 30 ft)  Assistive Device: Gait belt;Walker, rolling  Ambulation - Level of Assistance: Minimal assistance  Gait Abnormalities: Shuffling gait  Base of Support: Widened  Speed/Gely: Slow;Shuffled      Therapeutic Exercises:   10x LAQej, HARI    Pain Ratin/10    Activity Tolerance:   Good and requires rest breaks  Please refer to the flowsheet for vital signs taken during this treatment. After treatment patient left in no apparent distress:   Sitting in chair and Call bell within reach    COMMUNICATION/COLLABORATION:   The patients plan of care was discussed with: Occupational therapist.     Radha Varela   Time Calculation: 15 mins         Problem: Mobility Impaired (Adult and Pediatric)  Goal: *Acute Goals and Plan of Care (Insert Text)  Description: Pt will be I with LE HEP in 7 days. Pt will perform bed mobility with mod I in 7 days. Pt will perform transfers with mod I in 7 days. Pt will amb  feet with LRAD safely with mod I in 7 days.        Outcome: Progressing Towards Goal

## 2021-10-08 NOTE — PROGRESS NOTES
*ATTENTION:  This note has been created by a medical student for educational purposes only. Please do not refer to the content of this note for clinical decision-making, billing, or other purposes. Please see attending physicians note to obtain clinical information on this patient. *      Hospitalist Progress Note    Subjective:   Daily Progress Note: 10/8/2021 11:27 AM    Patient is a 71 y. o. year old female past medical history of diabetes hypertension hyperlipidemia chronic A. fib came to emergency room because of shortness of breath and dyspnea according the patient patient have Covid 19 in April since then patient feeling weak tired seen in the ER 3 times but recently getting more worse came to the emergency room seen by the ER physician     10/8  Patient seen today resting in bed. She admits to dyspnea on exertion, orthopnea, dysphagia, fatigue. She denies chest pain, fever, abdominal pain. Seen by speech therapy.  Authorization started for [facility]    Current Facility-Administered Medications   Medication Dose Route Frequency    dilTIAZem ER (CARDIZEM CD) capsule 120 mg  120 mg Oral DAILY    predniSONE (DELTASONE) tablet 10 mg  10 mg Oral DAILY WITH BREAKFAST    amLODIPine (NORVASC) tablet 10 mg  10 mg Oral DAILY    apixaban (ELIQUIS) tablet 5 mg  5 mg Oral BID    chlorthalidone (HYGROTON) tablet 25 mg  25 mg Oral DAILY    gabapentin (NEURONTIN) capsule 100 mg  100 mg Oral QHS    glipiZIDE (GLUCOTROL) tablet 5 mg  5 mg Oral DAILY    magnesium oxide (MAG-OX) tablet 400 mg  400 mg Oral DAILY    oxybutynin (DITROPAN) tablet 5 mg  5 mg Oral DAILY    pravastatin (PRAVACHOL) tablet 80 mg  80 mg Oral DAILY    lisinopriL (PRINIVIL, ZESTRIL) tablet 40 mg  40 mg Oral DAILY    albuterol-ipratropium (DUO-NEB) 2.5 MG-0.5 MG/3 ML  3 mL Nebulization Q6H RT    insulin lispro (HUMALOG) injection   SubCUTAneous AC&HS    glucose chewable tablet 16 g  4 Tablet Oral PRN    dextrose (D50W) injection syrg 12.5-25 g  25-50 mL IntraVENous PRN    glucagon (GLUCAGEN) injection 1 mg  1 mg IntraMUSCular PRN    0.9% sodium chloride infusion  25 mL/hr IntraVENous CONTINUOUS    acetaminophen (TYLENOL) tablet 650 mg  650 mg Oral Q6H PRN    Or    acetaminophen (TYLENOL) suppository 650 mg  650 mg Rectal Q6H PRN    polyethylene glycol (MIRALAX) packet 17 g  17 g Oral DAILY PRN    ondansetron (ZOFRAN ODT) tablet 4 mg  4 mg Oral Q8H PRN    Or    ondansetron (ZOFRAN) injection 4 mg  4 mg IntraVENous Q6H PRN    famotidine (PEPCID) tablet 40 mg  40 mg Oral BID            Objective:     Visit Vitals  BP (!) 143/79   Pulse 85   Temp 98 °F (36.7 °C)   Resp 18   Ht 5' 4.5\" (1.638 m)   Wt 236 lb 15.9 oz (107.5 kg)   SpO2 95%   BMI 40.05 kg/m²    O2 Flow Rate (L/min): 1 l/min O2 Device: None (Room air)    Temp (24hrs), Av °F (36.7 °C), Min:97.7 °F (36.5 °C), Max:98.3 °F (36.8 °C)      No intake/output data recorded. 10/06 1901 - 10/08 0700  In: -   Out: 900 [Urine:900]    Physical Exam:  Constitutional: Alert and awke  HENT:  Head: Normocephalic and atraumatic. Eyes: Pupils are equal, round, and reactive to light. EOM are normal.   Cardiovascular: Normal rate, regular rhythm and normal heart sounds. Pulmonary/Chest: Breath sounds normal. No wheezes. No rales. Exhibits no tenderness. Abdominal: Soft. Bowel sounds are normal. There is no abdominal tenderness. There is no rebound and no guarding. Musculoskeletal: Normal range of motion.    Neurological: apporiate mood and judgement   Skin:       Data Review    Recent Results (from the past 24 hour(s))   GLUCOSE, POC    Collection Time: 10/07/21  4:12 PM   Result Value Ref Range    Glucose (POC) 84 65 - 117 mg/dL    Performed by Sameera Garber    GLUCOSE, POC    Collection Time: 10/07/21  8:58 PM   Result Value Ref Range    Glucose (POC) 271 (H) 65 - 117 mg/dL    Performed by Vivek Correia Se, POC    Collection Time: 10/08/21  7:53 AM   Result Value Ref Range Glucose (POC) 129 (H) 65 - 117 mg/dL    Performed by [employer] [place], POC    Collection Time: 10/08/21 11:05 AM   Result Value Ref Range    Glucose (POC) 115 65 - 117 mg/dL    Performed by Dasha Lopez          Assessment/Plan:     Acute dyspnea  History of COVID-19  Type 2 diabetes  Hypertension  Hyperlipidemia  History of A. Fib  Pulmonary hypertension  · LV: Estimated LVEF is 60 - 65%. Normal cavity size, wall thickness and systolic function (ejection fraction normal). PLAN      PT OT consult and then discharge tomorrow discussed with the patient daughter at the bedside    Per Pulm:   -Discontinue S patient on low-dose prednisone  -sleep study as an outpatient    Per cardiology:  -Chronic atrial fibrillation:   - Rate controlled. Will add Cardizem if rate is uncontrolled. Her CHADS2-VASc score is 4 ; continue Eliquis Continue telemetry monitoring.  Keep serum potassium between 4-5 and serum magnesium > 2.   - Recommend outpatient appointment to establish care     Continue following medications    Current Facility-Administered Medications:     dilTIAZem ER (CARDIZEM CD) capsule 120 mg, 120 mg, Oral, DAILY, Sherley MARTINEZ MD, 120 mg at 10/08/21 6422    predniSONE (DELTASONE) tablet 10 mg, 10 mg, Oral, DAILY WITH BREAKFAST, Tamiko Newsome MD, 10 mg at 10/08/21 0939    amLODIPine (NORVASC) tablet 10 mg, 10 mg, Oral, DAILY, Beverly Cerna MD, 10 mg at 10/07/21 2105    apixaban (ELIQUIS) tablet 5 mg, 5 mg, Oral, BID, Beverly Cerna MD, 5 mg at 10/08/21 8826    chlorthalidone (HYGROTON) tablet 25 mg, 25 mg, Oral, DAILY, Beverly Cerna MD, 25 mg at 10/08/21 506    gabapentin (NEURONTIN) capsule 100 mg, 100 mg, Oral, QHS, Beverly Cerna MD, 100 mg at 10/07/21 2105    glipiZIDE (GLUCOTROL) tablet 5 mg, 5 mg, Oral, DAILY, Beverly Cerna MD, 5 mg at 10/08/21 8786    magnesium oxide (MAG-OX) tablet 400 mg, 400 mg, Oral, DAILY, Beverly Cerna MD, 400 mg at 10/08/21 7231   oxybutynin (DITROPAN) tablet 5 mg, 5 mg, Oral, DAILY, Beverly Cerna MD, 5 mg at 10/08/21 5415    pravastatin (PRAVACHOL) tablet 80 mg, 80 mg, Oral, DAILY, Beverly Cerna MD, 80 mg at 10/08/21 8084    lisinopriL (PRINIVIL, ZESTRIL) tablet 40 mg, 40 mg, Oral, DAILY, Beverly Cerna MD, 40 mg at 10/08/21 0937    albuterol-ipratropium (DUO-NEB) 2.5 MG-0.5 MG/3 ML, 3 mL, Nebulization, Q6H RT, Beverly Cerna MD, 3 mL at 10/08/21 0734    insulin lispro (HUMALOG) injection, , SubCUTAneous, AC&HS, Beverly Cerna MD, 4 Units at 10/07/21 2105    glucose chewable tablet 16 g, 4 Tablet, Oral, PRN, Tita Cerna MD    dextrose (D50W) injection syrg 12.5-25 g, 25-50 mL, IntraVENous, PRN, Tita Cerna MD    glucagon [facility]) injection 1 mg, 1 mg, IntraMUSCular, PRN, Tita Cerna MD    0.9% sodium chloride infusion, 25 mL/hr, IntraVENous, CONTINUOUS, Beverly Cerna MD, Last Rate: 25 mL/hr at 10/06/21 1125, 25 mL/hr at 10/06/21 1125    acetaminophen (TYLENOL) tablet 650 mg, 650 mg, Oral, Q6H PRN, 650 mg at 10/07/21 0443 **OR** acetaminophen (TYLENOL) suppository 650 mg, 650 mg, Rectal, Q6H PRN, Tita Cerna MD    polyethylene glycol (MIRALAX) packet 17 g, 17 g, Oral, DAILY PRN, Tita Cerna MD    ondansetron (ZOFRAN ODT) tablet 4 mg, 4 mg, Oral, Q8H PRN **OR** ondansetron (ZOFRAN) injection 4 mg, 4 mg, IntraVENous, Q6H PRN, Beverly Cerna MD    famotidine (PEPCID) tablet 40 mg, 40 mg, Oral, BID, Beverly Cerna MD, 40 mg at 10/08/21 9850

## 2021-10-08 NOTE — PROGRESS NOTES
Hospitalist Progress Note    Subjective:   Daily Progress Note: 10/8/2021 11:27 AM    Patient is a 71 y. o. year old female past medical history of diabetes hypertension hyperlipidemia chronic A. fib came to emergency room because of shortness of breath and dyspnea according the patient patient have Covid 19 in April since then patient feeling weak tired seen in the ER 3 times but recently getting more worse came to the emergency room seen by the ER physician     10/8  Patient seen today resting in bed. She admits to dyspnea on exertion, orthopnea, dysphagia, fatigue. She denies chest pain, fever, abdominal pain. Seen by speech therapy.  Authorization started for [facility]    Current Facility-Administered Medications   Medication Dose Route Frequency    dilTIAZem ER (CARDIZEM CD) capsule 120 mg  120 mg Oral DAILY    predniSONE (DELTASONE) tablet 10 mg  10 mg Oral DAILY WITH BREAKFAST    amLODIPine (NORVASC) tablet 10 mg  10 mg Oral DAILY    apixaban (ELIQUIS) tablet 5 mg  5 mg Oral BID    chlorthalidone (HYGROTON) tablet 25 mg  25 mg Oral DAILY    gabapentin (NEURONTIN) capsule 100 mg  100 mg Oral QHS    glipiZIDE (GLUCOTROL) tablet 5 mg  5 mg Oral DAILY    magnesium oxide (MAG-OX) tablet 400 mg  400 mg Oral DAILY    oxybutynin (DITROPAN) tablet 5 mg  5 mg Oral DAILY    pravastatin (PRAVACHOL) tablet 80 mg  80 mg Oral DAILY    lisinopriL (PRINIVIL, ZESTRIL) tablet 40 mg  40 mg Oral DAILY    albuterol-ipratropium (DUO-NEB) 2.5 MG-0.5 MG/3 ML  3 mL Nebulization Q6H RT    insulin lispro (HUMALOG) injection   SubCUTAneous AC&HS    glucose chewable tablet 16 g  4 Tablet Oral PRN    dextrose (D50W) injection syrg 12.5-25 g  25-50 mL IntraVENous PRN    glucagon (GLUCAGEN) injection 1 mg  1 mg IntraMUSCular PRN    0.9% sodium chloride infusion  25 mL/hr IntraVENous CONTINUOUS    acetaminophen (TYLENOL) tablet 650 mg  650 mg Oral Q6H PRN    Or    acetaminophen (TYLENOL) suppository 650 mg  650 mg Rectal Q6H PRN    polyethylene glycol (MIRALAX) packet 17 g  17 g Oral DAILY PRN    ondansetron (ZOFRAN ODT) tablet 4 mg  4 mg Oral Q8H PRN    Or    ondansetron (ZOFRAN) injection 4 mg  4 mg IntraVENous Q6H PRN    famotidine (PEPCID) tablet 40 mg  40 mg Oral BID            Objective:     Visit Vitals  BP (!) 143/79   Pulse 85   Temp 98 °F (36.7 °C)   Resp 18   Ht 5' 4.5\" (1.638 m)   Wt 107.5 kg (236 lb 15.9 oz)   SpO2 95%   BMI 40.05 kg/m²    O2 Flow Rate (L/min): 1 l/min O2 Device: None (Room air)    Temp (24hrs), Av.9 °F (36.6 °C), Min:97.7 °F (36.5 °C), Max:98 °F (36.7 °C)      No intake/output data recorded. 10/06 1901 - 10/08 0700  In: -   Out: 900 [Urine:900]    Physical Exam:  Constitutional: Alert and awke  HENT:  Head: Normocephalic and atraumatic. Eyes: Pupils are equal, round, and reactive to light. EOM are normal.   Cardiovascular: Normal rate, regular rhythm and normal heart sounds. Pulmonary/Chest: Breath sounds normal. No wheezes. No rales. Exhibits no tenderness. Abdominal: Soft. Bowel sounds are normal. There is no abdominal tenderness. There is no rebound and no guarding. Musculoskeletal: Normal range of motion.    Neurological: apporiate mood and judgement   Skin:       Data Review    Recent Results (from the past 24 hour(s))   GLUCOSE, POC    Collection Time: 10/07/21  4:12 PM   Result Value Ref Range    Glucose (POC) 84 65 - 117 mg/dL    Performed by Lawyer Reagan    GLUCOSE, POC    Collection Time: 10/07/21  8:58 PM   Result Value Ref Range    Glucose (POC) 271 (H) 65 - 117 mg/dL    Performed by Avinash Parekh    GLUCOSE, POC    Collection Time: 10/08/21  7:53 AM   Result Value Ref Range    Glucose (POC) 129 (H) 65 - 117 mg/dL    Performed by [address] [place] [address], POC    Collection Time: 10/08/21 11:05 AM   Result Value Ref Range    Glucose (POC) 115 65 - 117 mg/dL    Performed by Nicole Dykes          Assessment/Plan:     Acute dyspnea  History of COVID-19  Type 2 diabetes  Hypertension  Hyperlipidemia  History of A. Fib  Pulmonary hypertension  · LV: Estimated LVEF is 60 - 65%. Normal cavity size, wall thickness and systolic function (ejection fraction normal). PLAN      PT OT consult and then discharge tomorrow discussed with the patient daughter at the bedside  While patient here with the family want to see GI  For difficulty in swallowing  Also order speech therapy to see before discharge    Per Pulm:   -Discontinue S patient on low-dose prednisone  -sleep study as an outpatient    Per cardiology:  -Chronic atrial fibrillation:   - Rate controlled. Will add Cardizem if rate is uncontrolled. Her CHADS2-VASc score is 4 ; continue Eliquis Continue telemetry monitoring.  Keep serum potassium between 4-5 and serum magnesium > 2.   - Recommend outpatient appointment to establish care     Continue following medications    Current Facility-Administered Medications:     dilTIAZem ER (CARDIZEM CD) capsule 120 mg, 120 mg, Oral, DAILY, Vanessa MARTINEZ MD, 120 mg at 10/08/21 2969    predniSONE (DELTASONE) tablet 10 mg, 10 mg, Oral, DAILY WITH BREAKFAST, Baron Leslie Newsome MD, 10 mg at 10/08/21 0939    amLODIPine (NORVASC) tablet 10 mg, 10 mg, Oral, DAILY, Beverly Cerna MD, 10 mg at 10/07/21 2105    apixaban (ELIQUIS) tablet 5 mg, 5 mg, Oral, BID, Beverly Cerna MD, 5 mg at 10/08/21 3420    chlorthalidone (HYGROTON) tablet 25 mg, 25 mg, Oral, DAILY, Beverly Cerna MD, 25 mg at 10/08/21 9455    gabapentin (NEURONTIN) capsule 100 mg, 100 mg, Oral, QHS, Beverly Cerna MD, 100 mg at 10/07/21 2105    glipiZIDE (GLUCOTROL) tablet 5 mg, 5 mg, Oral, DAILY, Beverly Cerna MD, 5 mg at 10/08/21 3414    magnesium oxide (MAG-OX) tablet 400 mg, 400 mg, Oral, DAILY, Beverly Cerna MD, 400 mg at 10/08/21 0783    oxybutynin (DITROPAN) tablet 5 mg, 5 mg, Oral, DAILY, Beverly Cerna MD, 5 mg at 10/08/21 0937    pravastatin (PRAVACHOL) tablet 80 mg, 80 mg, Oral, DAILY, Beverly Cerna MD, 80 mg at 10/08/21 8105    lisinopriL (PRINIVIL, ZESTRIL) tablet 40 mg, 40 mg, Oral, DAILY, Beverly Cerna MD, 40 mg at 10/08/21 0937    albuterol-ipratropium (DUO-NEB) 2.5 MG-0.5 MG/3 ML, 3 mL, Nebulization, Q6H RT, Beverly Cerna MD, 3 mL at 10/08/21 1306    insulin lispro (HUMALOG) injection, , SubCUTAneous, AC&HS, Beverly Cerna MD, 4 Units at 10/07/21 2105    glucose chewable tablet 16 g, 4 Tablet, Oral, PRN, Geno Cerna MD    dextrose (D50W) injection syrg 12.5-25 g, 25-50 mL, IntraVENous, PRN, Geno Cerna MD    glucagon [facility]) injection 1 mg, 1 mg, IntraMUSCular, PRN, Geno Cerna MD    0.9% sodium chloride infusion, 25 mL/hr, IntraVENous, CONTINUOUS, Beverly Cerna MD, Last Rate: 25 mL/hr at 10/08/21 1246, 25 mL/hr at 10/08/21 1246    acetaminophen (TYLENOL) tablet 650 mg, 650 mg, Oral, Q6H PRN, 650 mg at 10/07/21 0443 **OR** acetaminophen (TYLENOL) suppository 650 mg, 650 mg, Rectal, Q6H PRN, Geno Cerna MD    polyethylene glycol (MIRALAX) packet 17 g, 17 g, Oral, DAILY PRN, Beverly Cerna MD, 17 g at 10/08/21 1247    ondansetron (ZOFRAN ODT) tablet 4 mg, 4 mg, Oral, Q8H PRN **OR** ondansetron (ZOFRAN) injection 4 mg, 4 mg, IntraVENous, Q6H PRN, Beverly Cerna MD    famotidine (PEPCID) tablet 40 mg, 40 mg, Oral, BID, Beverly Cerna MD, 40 mg at 10/08/21 4076

## 2021-10-08 NOTE — PROGRESS NOTES
Problem: Pressure Injury - Risk of  Goal: *Prevention of pressure injury  Description: Document Nestor Scale and appropriate interventions in the flowsheet. Outcome: Progressing Towards Goal  Note: Pressure Injury Interventions:  Sensory Interventions: Assess changes in LOC, Assess need for specialty bed, Keep linens dry and wrinkle-free, Maintain/enhance activity level, Minimize linen layers    Moisture Interventions: Absorbent underpads, Apply protective barrier, creams and emollients, Minimize layers, Maintain skin hydration (lotion/cream)    Activity Interventions: Assess need for specialty bed, Increase time out of bed, PT/OT evaluation    Mobility Interventions: Assess need for specialty bed, HOB 30 degrees or less, PT/OT evaluation, Turn and reposition approx. every two hours(pillow and wedges)    Nutrition Interventions: Document food/fluid/supplement intake, Offer support with meals,snacks and hydration                     Problem: Patient Education: Go to Patient Education Activity  Goal: Patient/Family Education  Outcome: Progressing Towards Goal     Problem: Falls - Risk of  Goal: *Absence of Falls  Description: Document Carlee Starch Fall Risk and appropriate interventions in the flowsheet.   Outcome: Progressing Towards Goal  Note: Fall Risk Interventions:  Mobility Interventions: Bed/chair exit alarm, OT consult for ADLs, PT Consult for mobility concerns         Medication Interventions: Bed/chair exit alarm    Elimination Interventions: Bed/chair exit alarm, Call light in reach              Problem: Patient Education: Go to Patient Education Activity  Goal: Patient/Family Education  Outcome: Progressing Towards Goal     Problem: Gas Exchange - Impaired  Goal: *Absence of hypoxia  Outcome: Progressing Towards Goal     Problem: Patient Education: Go to Patient Education Activity  Goal: Patient/Family Education  Outcome: Progressing Towards Goal     Problem: Patient Education: Go to Patient Education Activity  Goal: Patient/Family Education  Outcome: Progressing Towards Goal     Problem: Patient Education: Go to Patient Education Activity  Goal: Patient/Family Education  Outcome: Progressing Towards Goal

## 2021-10-08 NOTE — CONSULTS
Consult    Patient: Cleveland Granado MRN: [id number]  SSN: [id number]    YOB: 1950  Age: 66 y.o. Sex: female      Subjective:      Cleveland Granado is a 66 y.o. female who is being seen for dysphagia. She was transferred to the emergency room from [facility] hospital because of shortness of breath and dyspnea , was admitted to the hospital 3 days ago for the dyspnea, according to the patient primary care doctor and admitting physician patient developed dysphagia, fatigue, with frequent choking, heartburn, GI consultation placed,    No fever no chills no GI bleeding,  Past Medical History:   Diagnosis Date    Diabetes ([address]     High cholesterol     Hypertension      Past Surgical History:   Procedure Laterality Date    HX HEMORRHOIDECTOMY      HX TONSILLECTOMY      HX WISDOM TEETH EXTRACTION        No family history on file.   Social History     Tobacco Use    Smoking status: Never Smoker   Substance Use Topics    Alcohol use: Never      Current Facility-Administered Medications   Medication Dose Route Frequency Provider Last Rate Last Admin    dilTIAZem ER (CARDIZEM CD) capsule 120 mg  120 mg Oral DAILY Ian Ritchie MD   120 mg at 10/08/21 9965    predniSONE (DELTASONE) tablet 10 mg  10 mg Oral DAILY WITH BREAKFAST Mindy Newsome MD   10 mg at 10/08/21 0939    amLODIPine (NORVASC) tablet 10 mg  10 mg Oral DAILY Beverly Cerna MD   10 mg at 10/07/21 2105    apixaban (ELIQUIS) tablet 5 mg  5 mg Oral BID Beverly Ceran MD   5 mg at 10/08/21 6507    chlorthalidone (HYGROTON) tablet 25 mg  25 mg Oral DAILY Beverly Cerna MD   25 mg at 10/08/21 6043    gabapentin (NEURONTIN) capsule 100 mg  100 mg Oral QHS Beverly Cerna MD   100 mg at 10/07/21 2105    glipiZIDE (GLUCOTROL) tablet 5 mg  5 mg Oral DAILY Beverly Cerna MD   5 mg at 10/08/21 1501    magnesium oxide (MAG-OX) tablet 400 mg  400 mg Oral DAILY Beverly Cerna MD   400 mg at 10/08/21 9120    oxybutynin (DITROPAN) tablet 5 mg  5 mg Oral DAILY Beverly Cerna MD   5 mg at 10/08/21 6154    pravastatin (PRAVACHOL) tablet 80 mg  80 mg Oral DAILY Beverly Cerna MD   80 mg at 10/08/21 8536    lisinopriL (PRINIVIL, ZESTRIL) tablet 40 mg  40 mg Oral DAILY Beverly Cerna MD   40 mg at 10/08/21 6770    albuterol-ipratropium (DUO-NEB) 2.5 MG-0.5 MG/3 ML  3 mL Nebulization Q6H RT Beverly Cerna MD   3 mL at 10/08/21 1306    insulin lispro (HUMALOG) injection   SubCUTAneous AC&HS Sukhdeep Mccullough MD   4 Units at 10/07/21 2105    glucose chewable tablet 16 g  4 Tablet Oral PRN Tom Cerna MD        dextrose (D50W) injection syrg 12.5-25 g  25-50 mL IntraVENous PRN Tom Cerna MD        glucagon (GLUCAGEN) injection 1 mg  1 mg IntraMUSCular PRN Sukhdeep Mccullough MD        0.9% sodium chloride infusion  25 mL/hr IntraVENous CONTINUOUS Beverly Cerna MD 25 mL/hr at 10/08/21 1246 25 mL/hr at 10/08/21 1246    acetaminophen (TYLENOL) tablet 650 mg  650 mg Oral Q6H PRN Tom Cerna MD   650 mg at 10/07/21 0443    Or    acetaminophen (TYLENOL) suppository 650 mg  650 mg Rectal Q6H PRN Tom Cerna MD        polyethylene glycol (MIRALAX) packet 17 g  17 g Oral DAILY PRN Beverly Cerna MD   17 g at 10/08/21 1247    ondansetron (ZOFRAN ODT) tablet 4 mg  4 mg Oral Q8H PRN Tom Cerna MD        Or    ondansetron [facility]) injection 4 mg  4 mg IntraVENous Q6H PRN Tom Cerna MD        famotidine (PEPCID) tablet 40 mg  40 mg Oral BID Sukhdeep Mccullough MD   40 mg at 10/08/21 0579        Allergies   Allergen Reactions    Doxycycline Unknown (comments)     HIVES    Metoprolol Unknown (comments)     HIVES       Review of Systems:  Review of Systems   Constitutional: Positive for weight loss. HENT: Negative. Eyes: Negative. Respiratory: Negative. Cardiovascular: Positive for leg swelling. Gastrointestinal: Positive for nausea and vomiting.    Genitourinary: Positive for urgency. Musculoskeletal: Positive for myalgias. Skin: Negative. Neurological: Negative. Psychiatric/Behavioral: Negative for hallucinations. Objective:     Vitals:    10/08/21 0056 10/08/21 0734 10/08/21 0750 10/08/21 1511   BP: (!) 152/76  (!) 143/79 (!) 144/76   Pulse: 85  85 82   Resp: 18  18 18   Temp: 97.7 °F (36.5 °C)  98 °F (36.7 °C) 98.3 °F (36.8 °C)   SpO2: 96% 96% 95% 100%   Weight:       Height:            Physical Exam:  Physical Exam  Constitutional:       Appearance: She is ill-appearing. HENT:      Mouth/Throat:      Mouth: Mucous membranes are moist.   Eyes:      General: No scleral icterus. Extraocular Movements: Extraocular movements intact. Cardiovascular:      Rate and Rhythm: Regular rhythm. Heart sounds: Normal heart sounds. Pulmonary:      Effort: Pulmonary effort is normal.   Abdominal:      General: Abdomen is flat. Musculoskeletal:         General: Normal range of motion. Skin:     General: Skin is warm. Neurological:      General: No focal deficit present. Psychiatric:         Mood and Affect: Mood normal.          Recent Results (from the past 24 hour(s))   GLUCOSE, POC    Collection Time: 10/07/21  8:58 PM   Result Value Ref Range    Glucose (POC) 271 (H) 65 - 117 mg/dL    Performed by Ant Hanks    GLUCOSE, POC    Collection Time: 10/08/21  7:53 AM   Result Value Ref Range    Glucose (POC) 129 (H) 65 - 117 mg/dL    Performed by [employer], POC    Collection Time: 10/08/21 11:05 AM   Result Value Ref Range    Glucose (POC) 115 65 - 117 mg/dL    Performed by [employer], POC    Collection Time: 10/08/21  3:16 PM   Result Value Ref Range    Glucose (POC) 191 (H) 65 - 117 mg/dL    Performed by Nikki Jones         CTA CHEST W OR W WO CONT   Final Result   1. No large central pulmonary embolus. Main pulmonary artery is larger than the   aorta suggesting pulmonary arterial hypertension. 2. Cardiomegaly.    3. Bilateral lower lung atelectasis or infiltrates.       XR CHEST PORT   Final Result         Assessment:     Hospital Problems  Never Reviewed        Codes Class Noted POA    Dyspnea ICD-10-CM: R06.00  ICD-9-CM: 786.09  10/5/2021 Unknown        SOB (shortness of breath) ICD-10-CM: R06.02  ICD-9-CM: 786.05  10/5/2021 Unknown          History of COVID-19 infection, had difficult swallow, unknown etiology,  Dysmotility, there has been some report regarding COVID-19 infection induced esophageal dysmotility     versus if secondary infection  No evidence of CVA,  Has been on steroids for a long time,  Has been treated for acute dyspnea, atrial fibrillation,    Plan:   Continue on steroids,  Cardizem for heart rate control  PPI as ordered,  Nystatin 5 mL swallow, swiss 3 times daily  May do upper GI studies should patient symptom persist    Signed By: Edel Jensen MD     October 8, 2021         Thank you for allowing me to participate in this patients care  Cc Referring Physician   Marycarmen Adams MD

## 2021-10-08 NOTE — PROGRESS NOTES
PULMONARY NOTE  [facility]    Name: Yamileth Dunbar MRN: [id number]   : 1950 Hospital: [facility]   Date: 10/8/2021  Admission date: 10/5/2021 Hospital Day: 4       HPI:     Hospital Problems  Never Reviewed        Codes Class Noted POA    Dyspnea ICD-10-CM: R06.00  ICD-9-CM: 786.09  10/5/2021 Unknown        SOB (shortness of breath) ICD-10-CM: R06.02  ICD-9-CM: 786.05  10/5/2021 Unknown                   [x] High complexity decision making was performed  [x] See my orders for details      Subjective/Initial History:     I was asked by Todd Whitten MD to see Yamileth Dunbar  a 66 y.o.   female in consultation     Excerpts from admission 10/5/2021 or consult notes as follows:     58-year-old lady came in because of shortness of breath and dyspnea significant past medical history of chronic A. fib, diabetes mellitus hypertension hyperlipidemia.   She was diagnosed with COVID-19 pneumonia in April since then patient has not been feeling well she has multiple admissions and ER visits secondary to the above for shortness of breath and dyspnea according to the daughter she is not been feeling well so the past 2 days she is more short of breath so he came to the hospital got admitted and pulmonary consult was called    Allergies   Allergen Reactions    Doxycycline Unknown (comments)     HIVES    Metoprolol Unknown (comments)     HIVES        MAR reviewed and pertinent medications noted or modified as needed     Current Facility-Administered Medications   Medication    dilTIAZem ER (CARDIZEM CD) capsule 120 mg    predniSONE (DELTASONE) tablet 10 mg    amLODIPine (NORVASC) tablet 10 mg    apixaban (ELIQUIS) tablet 5 mg    chlorthalidone (HYGROTON) tablet 25 mg    gabapentin (NEURONTIN) capsule 100 mg    glipiZIDE (GLUCOTROL) tablet 5 mg    magnesium oxide (MAG-OX) tablet 400 mg    oxybutynin (DITROPAN) tablet 5 mg    pravastatin (PRAVACHOL) tablet 80 mg    lisinopriL (PRINIVIL, ZESTRIL) tablet 40 mg    albuterol-ipratropium (DUO-NEB) 2.5 MG-0.5 MG/3 ML    insulin lispro (HUMALOG) injection    glucose chewable tablet 16 g    dextrose (D50W) injection syrg 12.5-25 g    glucagon (GLUCAGEN) injection 1 mg    0.9% sodium chloride infusion    acetaminophen (TYLENOL) tablet 650 mg    Or    acetaminophen (TYLENOL) suppository 650 mg    polyethylene glycol (MIRALAX) packet 17 g    ondansetron (ZOFRAN ODT) tablet 4 mg    Or    ondansetron (ZOFRAN) injection 4 mg    famotidine (PEPCID) tablet 40 mg      Patient PCP: Milton Whitman MD  PMH:  has a past medical history of Diabetes ([address] High cholesterol, and Hypertension. PSH:   has a past surgical history that includes hx hemorrhoidectomy; hx tonsillectomy; and hx wisdom teeth extraction. FHX: family history is not on file. SHX:  reports that she has never smoked. She does not have any smokeless tobacco history on file. She reports that she does not drink alcohol and does not use drugs. ROS:    Review of Systems   Constitutional: Positive for malaise/fatigue. HENT: Negative. Eyes: Negative. Respiratory: Positive for shortness of breath. Cardiovascular: Negative. Gastrointestinal: Negative. Genitourinary: Negative. Musculoskeletal: Negative. Skin: Negative. Neurological: Negative. Psychiatric/Behavioral: Negative.          Objective:     Vital Signs: Telemetry atrial fibrillation Intake/Output:   Visit Vitals  BP (!) 143/79   Pulse 85   Temp 98 °F (36.7 °C)   Resp 18   Ht 5' 4.5\" (1.638 m)   Wt 107.5 kg (236 lb 15.9 oz)   SpO2 95%   BMI 40.05 kg/m²       Temp (24hrs), Av °F (36.7 °C), Min:97.7 °F (36.5 °C), Max:98.3 °F (36.8 °C)        O2 Device: None (Room air) O2 Flow Rate (L/min): 1 l/min       Wt Readings from Last 4 Encounters:   10/06/21 107.5 kg (236 lb 15.9 oz)   21 108.9 kg (240 lb)        No intake or output data in the 24 hours ending 10/08/21 0905    Last shift: No intake/output data recorded. Last 3 shifts: 10/06 1901 - 10/08 0700  In: -   Out: 900 [Urine:900]       Physical Exam:     Physical Exam  Constitutional:       Appearance: She is obese. HENT:      Head: Normocephalic and atraumatic. Nose: Nose normal.      Mouth/Throat:      Mouth: Mucous membranes are moist.   Eyes:      Pupils: Pupils are equal, round, and reactive to light. Cardiovascular:      Rate and Rhythm: Normal rate and regular rhythm. Pulses: Normal pulses. Heart sounds: Normal heart sounds. Pulmonary:      Effort: Pulmonary effort is normal.   Abdominal:      General: Abdomen is flat. Bowel sounds are normal.      Palpations: Abdomen is soft. Musculoskeletal:         General: Normal range of motion. Cervical back: Neck supple. Skin:     General: Skin is warm. Neurological:      General: No focal deficit present. Mental Status: She is alert. Psychiatric:         Mood and Affect: Mood normal.          Labs:    Recent Labs     10/07/21  0708 10/06/21  0430 10/05/21  1140   WBC 10.0 4.1 4.2   HGB 12.5 12.3 12.4    203 228     Recent Labs     10/07/21  0708 10/06/21  0430 10/05/21  1245    139 141   K 4.2 3.8 3.4*    101 102   CO2 35* 37* 37*   * 207* 101*   BUN 14 12 12   CREA 0.46* 0.43* 0.40*   CA 10.6* 10.0 9.8   ALB 3.3* 3.2* 3.2*   ALT 26 29 27     No results for input(s): PH, PCO2, PO2, HCO3, FIO2 in the last 72 hours. Recent Labs     10/05/21  2020 10/05/21  1245   TROIQ <0.05 <0.05     No results found for: BNPP, BNP   Lab Results   Component Value Date/Time    Culture result: No growth 2 days 10/05/2021 11:41 PM    Culture result: No growth 2 days 10/05/2021 11:38 PM   No results found for: TSH, TSHEXT, TSHEXT    Imaging:    CXR Results  (Last 48 hours)    None        Results from Hospital Encounter encounter on 10/05/21    XR CHEST PORT    Narrative  Chest single view. Comparison single view chest May 8, 2021.     Hilar prominence, this may be exaggerated related to reduced lung volumes. Unable to exclude lymphadenopathy. No gross interstitial or alveolar pulmonary edema. No pneumothorax or sizable  pleural effusion. Consider optimal inspiratory PA and lateral view of the chest or  CECT chest.      Results from Hospital Encounter encounter on 05/08/21    XR CHEST SNGL V    Narrative  Chest single view. A single view of the chest is obtained. Lung volumes are within normal limits. The peripheral lungs are clear. Cardiac and mediastinal structures are within  normal limits. There is no pneumothorax or pleural effusion. Results from [place] encounter on 10/05/21    CTA CHEST W OR W WO CONT    Narrative  Shortness of breath. Comparison chest x-ray 10/5/2021. Chest CTA Technique: Axial chest with IV contrast. Multiplanar chest  reformatting and chest MIPs. Dose reduction: All CT scans at this facility are performed using dose reduction  optimization techniques as appropriate to a performed exam including the  following: Automated exposure control, adjustments of the mA and/or kV according  to patient's size, or use of iterative reconstruction technique. FINDINGS: Mild cardiomegaly. No pericardial effusion. Thoracic aorta without  aneurysm or dissection. No large central pulmonary arterial filling defects. Main pulmonary artery is larger than the aorta. No mediastinal or hilar  adenopathy. Thoracic esophagus is collapsed. No pleural effusion. Bilateral  lower lung airspace disease. Included imaging of the upper abdomen demonstrates  dense material in the colon causing streak artifact. No axillary adenopathy. Included thyroid unremarkable. Degenerative changes about the bony structures. Impression  1. No large central pulmonary embolus. Main pulmonary artery is larger than the  aorta suggesting pulmonary arterial hypertension. 2. Cardiomegaly.   3. Bilateral lower lung atelectasis or infiltrates. IMPRESSION:   1. Dyspnea  2. Acute hypoxic respiratory failure  3. Personal history of COVID-19 pneumonia  4. Rule out obstructive sleep apnea  5. Chronic A. Fib on Eliquis  6. Hypertension hyperlipidemia  7. Pulmonary hypertension  8. Prognosis guarded       RECOMMENDATIONS/PLAN:     1. Her dyspnea is multifactorial secondary to underlying chronic A. fib pulmonary hypertension also possible sleep apnea and also she had history of COVID-19 pneumonia  2. CAT scan of the chest negative for pulmonary embolism atelectasis basal no lung mass  3. 2D echo shows normal ejection fraction  4. Patient on low-dose prednisone  5. She needs sleep study as an outpatient  6.  DVT, SUP prophylaxis               Capri Robledo MD

## 2021-10-08 NOTE — PROGRESS NOTES
Progress Note    Patient: Darby Chavez MRN: [id number]  SSN: [id number]    YOB: 1950  Age: 66 y.o. Sex: female      Admit Date: 10/5/2021    LOS: 3 days     Subjective:     66y.o. year-old female with past medical history significant for hypertension, hyperlipidemia, and chronic atrial fibrillation who presented to ED for evaluation of atrial fibrillation  - no events overnight    Telemetry Review: SR    Review of Symptoms:   ROS      Objective:     Vitals:    10/07/21 2054 10/08/21 0056 10/08/21 0734 10/08/21 0750   BP: (!) 163/85 (!) 152/76  (!) 143/79   Pulse: 94 85  85   Resp: 20 18  18   Temp: 98 °F (36.7 °C) 97.7 °F (36.5 °C)  98 °F (36.7 °C)   SpO2: 94% 96% 96% 95%   Weight:       Height:            Intake and Output:  Current Shift: No intake/output data recorded. Last three shifts: 10/06 1901 - 10/08 0700  In: -   Out: 900 [Urine:900]    Physical Exam:   General: Well nourished  NAD, A&O  HEENT: Normocephalic, PERRL, no drainage  Neck: Supple, Trachea midline, No JVD  RESP: CTA bilaterally   + Symmetrical chest movement. No SOB or distress. On NC  Cardiovascular: RRR no MRG  PVS: No rubor, cyanosis, no edema, Radial, DP, PT pulses equal bilaterally  ABD: soft, NT, Normoactive BS  Derm: Warm/Dry/Intact with no lesions, normal turgor  Neuro: A&O PPTS, cranial nerves II- XII grossly intact via interaction with patient. No focal deficits  PSYCH: No anxiety or agitation      Lab/Data Review: All labs reviewed last 24 hrs    TTE  ·  LV: Estimated LVEF is 60 - 65%. Normal cavity size, wall thickness and systolic function (ejection fraction normal). Image quality for this study was technically difficult. . No Echocardiographic e/o pHTN        Current Facility-Administered Medications:     dilTIAZem ER (CARDIZEM CD) capsule 120 mg, 120 mg, Oral, DAILY, Israel Craig MD    predniSONE (DELTASONE) tablet 10 mg, 10 mg, Oral, DAILY WITH BREAKFAST, Allauddin, Moody, MD, 10 mg at 10/07/21 1351    amLODIPine (NORVASC) tablet 10 mg, 10 mg, Oral, DAILY, Beverly Cerna MD, 10 mg at 10/07/21 2105    apixaban (ELIQUIS) tablet 5 mg, 5 mg, Oral, BID, Beverly Cerna MD, 5 mg at 10/07/21 2105    chlorthalidone (HYGROTON) tablet 25 mg, 25 mg, Oral, DAILY, Beverly Cerna MD, 25 mg at 10/07/21 0911    gabapentin (NEURONTIN) capsule 100 mg, 100 mg, Oral, QHS, Beverly Cerna MD, 100 mg at 10/07/21 2105    glipiZIDE (GLUCOTROL) tablet 5 mg, 5 mg, Oral, DAILY, George Mcnulty MD, 5 mg at 10/07/21 0858    magnesium oxide (MAG-OX) tablet 400 mg, 400 mg, Oral, DAILY, Beverly Cerna MD, 400 mg at 10/07/21 0859    oxybutynin (DITROPAN) tablet 5 mg, 5 mg, Oral, DAILY, George Mcnulty MD, 5 mg at 10/07/21 0859    pravastatin (PRAVACHOL) tablet 80 mg, 80 mg, Oral, DAILY, Beverly Cerna MD, 80 mg at 10/07/21 0858    lisinopriL (PRINIVIL, ZESTRIL) tablet 40 mg, 40 mg, Oral, DAILY, George Mcnulty MD, 40 mg at 10/07/21 0859    albuterol-ipratropium (DUO-NEB) 2.5 MG-0.5 MG/3 ML, 3 mL, Nebulization, Q6H RT, Beevrly Cerna MD, 3 mL at 10/08/21 0734    insulin lispro (HUMALOG) injection, , SubCUTAneous, AC&HS, Beverly Cerna MD, 4 Units at 10/07/21 2105    glucose chewable tablet 16 g, 4 Tablet, Oral, PRN, Beverly Cerna MD    dextrose (D50W) injection syrg 12.5-25 g, 25-50 mL, IntraVENous, PRN, Hansel Cerna MD    glucagon (GLUCAGEN) injection 1 mg, 1 mg, IntraMUSCular, PRN, George Mcnulty MD    0.9% sodium chloride infusion, 25 mL/hr, IntraVENous, CONTINUOUS, Beverly Cerna MD, Last Rate: 25 mL/hr at 10/06/21 1125, 25 mL/hr at 10/06/21 1125    acetaminophen (TYLENOL) tablet 650 mg, 650 mg, Oral, Q6H PRN, 650 mg at 10/07/21 0443 **OR** acetaminophen (TYLENOL) suppository 650 mg, 650 mg, Rectal, Q6H PRN, Beverly Cerna MD    polyethylene glycol (MIRALAX) packet 17 g, 17 g, Oral, DAILY PRN, Beverly Cerna MD    ondansetron (ZOFRAN ODT) tablet 4 mg, 4 mg, Oral, Q8H PRN **OR** ondansetron (ZOFRAN) injection 4 mg, 4 mg, IntraVENous, Q6H PRN, Beverly Cerna MD    famotidine (PEPCID) tablet 40 mg, 40 mg, Oral, BID, Beverly Cerna MD, 40 mg at 10/07/21 2105      Assessment:     Active Problems:    Dyspnea (10/5/2021)      SOB (shortness of breath) (10/5/2021)        Plan:     Our impression and recommendations are as follows:     1. paroxsymal atrial fibrillation:   - Rate is controlled. Now in NSR. Patient is allergic to metoprolol.   - Started Cardizem 120 mg ER  - Her CHADS2-VASc score is 4 ; continue Eliquis Continue telemetry monitoring. Keep serum potassium between 4-5 and serum magnesium > 2.   - Recommend outpatient appointment to establish care.      2. Hypertension:   - blood pressure is at goal.   - Continue to monitor.     3. Acute hypoxic respiratory failure:  - CTA negative for PE  - no clinical sings of volume overload  - recommend sleep study as outpatient  - appreciate pulmonary recommendations        Thank you for involving us in the care of this patient. Please do not hesitate to call if additional questions arise.  If after hours please call Rae Barboza MD, Laura Case, [address]  Structural Heart Disease  Endovascular and Vascular Medicine  Interventional Cardiology  [facility]  [phone]

## 2021-10-09 LAB
GLUCOSE BLD STRIP.AUTO-MCNC: 113 MG/DL (ref 65–117)
GLUCOSE BLD STRIP.AUTO-MCNC: 141 MG/DL (ref 65–117)
GLUCOSE BLD STRIP.AUTO-MCNC: 149 MG/DL (ref 65–117)
GLUCOSE BLD STRIP.AUTO-MCNC: 224 MG/DL (ref 65–117)
PERFORMED BY, TECHID: ABNORMAL
PERFORMED BY, TECHID: NORMAL

## 2021-10-09 PROCEDURE — 74011636637 HC RX REV CODE- 636/637: Performed by: INTERNAL MEDICINE

## 2021-10-09 PROCEDURE — 94640 AIRWAY INHALATION TREATMENT: CPT

## 2021-10-09 PROCEDURE — 82962 GLUCOSE BLOOD TEST: CPT

## 2021-10-09 PROCEDURE — 74011250637 HC RX REV CODE- 250/637: Performed by: INTERNAL MEDICINE

## 2021-10-09 PROCEDURE — 65270000029 HC RM PRIVATE

## 2021-10-09 PROCEDURE — 94760 N-INVAS EAR/PLS OXIMETRY 1: CPT

## 2021-10-09 PROCEDURE — 74011250637 HC RX REV CODE- 250/637: Performed by: FAMILY MEDICINE

## 2021-10-09 PROCEDURE — 74011636637 HC RX REV CODE- 636/637: Performed by: FAMILY MEDICINE

## 2021-10-09 PROCEDURE — 74011000250 HC RX REV CODE- 250: Performed by: FAMILY MEDICINE

## 2021-10-09 RX ADMIN — FAMOTIDINE 40 MG: 20 TABLET ORAL at 08:16

## 2021-10-09 RX ADMIN — AMLODIPINE BESYLATE 10 MG: 5 TABLET ORAL at 21:44

## 2021-10-09 RX ADMIN — INSULIN LISPRO 4 UNITS: 100 INJECTION, SOLUTION INTRAVENOUS; SUBCUTANEOUS at 18:06

## 2021-10-09 RX ADMIN — LISINOPRIL 40 MG: 40 TABLET ORAL at 08:16

## 2021-10-09 RX ADMIN — PREDNISONE 10 MG: 5 TABLET ORAL at 08:16

## 2021-10-09 RX ADMIN — APIXABAN 5 MG: 5 TABLET, FILM COATED ORAL at 21:44

## 2021-10-09 RX ADMIN — IPRATROPIUM BROMIDE AND ALBUTEROL SULFATE 3 ML: .5; 2.5 SOLUTION RESPIRATORY (INHALATION) at 14:15

## 2021-10-09 RX ADMIN — CHLORTHALIDONE 25 MG: 25 TABLET ORAL at 08:17

## 2021-10-09 RX ADMIN — APIXABAN 5 MG: 5 TABLET, FILM COATED ORAL at 08:16

## 2021-10-09 RX ADMIN — GLIPIZIDE 5 MG: 5 TABLET ORAL at 08:16

## 2021-10-09 RX ADMIN — OXYBUTYNIN CHLORIDE 5 MG: 5 TABLET ORAL at 08:16

## 2021-10-09 RX ADMIN — IPRATROPIUM BROMIDE AND ALBUTEROL SULFATE 3 ML: .5; 2.5 SOLUTION RESPIRATORY (INHALATION) at 20:58

## 2021-10-09 RX ADMIN — DILTIAZEM HYDROCHLORIDE 120 MG: 120 CAPSULE, COATED, EXTENDED RELEASE ORAL at 08:17

## 2021-10-09 RX ADMIN — IPRATROPIUM BROMIDE AND ALBUTEROL SULFATE 3 ML: .5; 2.5 SOLUTION RESPIRATORY (INHALATION) at 01:25

## 2021-10-09 RX ADMIN — POLYETHYLENE GLYCOL 3350 17 G: 17 POWDER, FOR SOLUTION ORAL at 10:16

## 2021-10-09 RX ADMIN — FAMOTIDINE 40 MG: 20 TABLET ORAL at 21:44

## 2021-10-09 RX ADMIN — GABAPENTIN 100 MG: 100 CAPSULE ORAL at 21:44

## 2021-10-09 RX ADMIN — PRAVASTATIN SODIUM 80 MG: 20 TABLET ORAL at 08:16

## 2021-10-09 RX ADMIN — Medication 400 MG: at 08:16

## 2021-10-09 RX ADMIN — INSULIN LISPRO 3 UNITS: 100 INJECTION, SOLUTION INTRAVENOUS; SUBCUTANEOUS at 08:17

## 2021-10-09 NOTE — PROGRESS NOTES
PULMONARY NOTE  [facility]    Name: Eleni Avalos MRN: [id number]   : 1950 Hospital: [address] [place] [address]   Date: 10/9/2021  Admission date: 10/5/2021 Hospital Day: 5       HPI:     Hospital Problems  Never Reviewed        Codes Class Noted POA    Dyspnea ICD-10-CM: R06.00  ICD-9-CM: 786.09  10/5/2021 Unknown        SOB (shortness of breath) ICD-10-CM: R06.02  ICD-9-CM: 786.05  10/5/2021 Unknown                   [x] High complexity decision making was performed  [x] See my orders for details      Subjective/Initial History:     I was asked by Ayala Bowen MD to see Eleni Avalos  a 66 y.o.   female in consultation     Excerpts from admission 10/5/2021 or consult notes as follows:     76-year-old lady came in because of shortness of breath and dyspnea significant past medical history of chronic A. fib, diabetes mellitus hypertension hyperlipidemia.   She was diagnosed with COVID-19 pneumonia in April since then patient has not been feeling well she has multiple admissions and ER visits secondary to the above for shortness of breath and dyspnea according to the daughter she is not been feeling well so the past 2 days she is more short of breath so he came to the hospital got admitted and pulmonary consult was called    Allergies   Allergen Reactions    Doxycycline Unknown (comments)     HIVES    Metoprolol Unknown (comments)     HIVES        MAR reviewed and pertinent medications noted or modified as needed     Current Facility-Administered Medications   Medication    dilTIAZem ER (CARDIZEM CD) capsule 120 mg    predniSONE (DELTASONE) tablet 10 mg    amLODIPine (NORVASC) tablet 10 mg    apixaban (ELIQUIS) tablet 5 mg    chlorthalidone (HYGROTON) tablet 25 mg    gabapentin (NEURONTIN) capsule 100 mg    glipiZIDE (GLUCOTROL) tablet 5 mg    magnesium oxide (MAG-OX) tablet 400 mg    oxybutynin (DITROPAN) tablet 5 mg    pravastatin (PRAVACHOL) tablet 80 mg    lisinopriL (PRINIVIL, ZESTRIL) tablet 40 mg    albuterol-ipratropium (DUO-NEB) 2.5 MG-0.5 MG/3 ML    insulin lispro (HUMALOG) injection    glucose chewable tablet 16 g    dextrose (D50W) injection syrg 12.5-25 g    glucagon (GLUCAGEN) injection 1 mg    acetaminophen (TYLENOL) tablet 650 mg    Or    acetaminophen (TYLENOL) suppository 650 mg    polyethylene glycol (MIRALAX) packet 17 g    ondansetron (ZOFRAN ODT) tablet 4 mg    Or    ondansetron (ZOFRAN) injection 4 mg    famotidine (PEPCID) tablet 40 mg      Patient PCP: Dottie Riley MD  PMH:  has a past medical history of Diabetes ([address] High cholesterol, and Hypertension. PSH:   has a past surgical history that includes hx hemorrhoidectomy; hx tonsillectomy; and hx wisdom teeth extraction. FHX: family history is not on file. SHX:  reports that she has never smoked. She does not have any smokeless tobacco history on file. She reports that she does not drink alcohol and does not use drugs. ROS:    Review of Systems   Constitutional: Positive for malaise/fatigue. HENT: Negative. Eyes: Negative. Respiratory: Positive for shortness of breath. Cardiovascular: Negative. Gastrointestinal: Negative. Genitourinary: Negative. Musculoskeletal: Negative. Skin: Negative. Neurological: Negative. Psychiatric/Behavioral: Negative.          Objective:     Vital Signs: Telemetry atrial fibrillation Intake/Output:   Visit Vitals  BP (!) 149/76 (BP 1 Location: Right upper arm, BP Patient Position: Sitting)   Pulse 96   Temp 97.7 °F (36.5 °C)   Resp 18   Ht 5' 4.5\" (1.638 m)   Wt 107.5 kg (236 lb 15.9 oz)   SpO2 92%   BMI 40.05 kg/m²       Temp (24hrs), Av.1 °F (36.7 °C), Min:97.7 °F (36.5 °C), Max:98.3 °F (36.8 °C)        O2 Device: None (Room air) O2 Flow Rate (L/min): 1 l/min       Wt Readings from Last 4 Encounters:   10/06/21 107.5 kg (236 lb 15.9 oz)   21 108.9 kg (240 lb)        No intake or output data in the 24 hours ending 10/09/21 0946    Last shift:      No intake/output data recorded. Last 3 shifts: No intake/output data recorded. Physical Exam:     Physical Exam  Constitutional:       Appearance: She is obese. HENT:      Head: Normocephalic and atraumatic. Nose: Nose normal.      Mouth/Throat:      Mouth: Mucous membranes are moist.   Eyes:      Pupils: Pupils are equal, round, and reactive to light. Cardiovascular:      Rate and Rhythm: Normal rate and regular rhythm. Pulses: Normal pulses. Heart sounds: Normal heart sounds. Pulmonary:      Effort: Pulmonary effort is normal.   Abdominal:      General: Abdomen is flat. Bowel sounds are normal.      Palpations: Abdomen is soft. Musculoskeletal:         General: Normal range of motion. Cervical back: Neck supple. Skin:     General: Skin is warm. Neurological:      General: No focal deficit present. Mental Status: She is alert. Psychiatric:         Mood and Affect: Mood normal.          Labs:    Recent Labs     10/07/21  0708   WBC 10.0   HGB 12.5        Recent Labs     10/07/21  0708      K 4.2      CO2 35*   *   BUN 14   CREA 0.46*   CA 10.6*   ALB 3.3*   ALT 26     No results for input(s): PH, PCO2, PO2, HCO3, FIO2 in the last 72 hours. No results for input(s): CPK, CKNDX, TROIQ in the last 72 hours. No lab exists for component: CPKMB  No results found for: BNPP, BNP   Lab Results   Component Value Date/Time    Culture result: No growth 3 days 10/05/2021 11:41 PM    Culture result: No growth 3 days 10/05/2021 11:38 PM   No results found for: TSH, TSHEXT, TSHEXT    Imaging:    CXR Results  (Last 48 hours)    None        Results from Hospital Encounter encounter on 10/05/21    XR CHEST PORT    Narrative  Chest single view. Comparison single view chest May 8, 2021. Hilar prominence, this may be exaggerated related to reduced lung volumes. Unable to exclude lymphadenopathy.   No gross interstitial or alveolar pulmonary edema. No pneumothorax or sizable  pleural effusion. Consider optimal inspiratory PA and lateral view of the chest or  CECT chest.      Results from Hospital Encounter encounter on 05/08/21    XR CHEST SNGL V    Narrative  Chest single view. A single view of the chest is obtained. Lung volumes are within normal limits. The peripheral lungs are clear. Cardiac and mediastinal structures are within  normal limits. There is no pneumothorax or pleural effusion. Results from [facility] encounter on 10/05/21    CTA CHEST W OR W WO CONT    Narrative  Shortness of breath. Comparison chest x-ray 10/5/2021. Chest CTA Technique: Axial chest with IV contrast. Multiplanar chest  reformatting and chest MIPs. Dose reduction: All CT scans at this facility are performed using dose reduction  optimization techniques as appropriate to a performed exam including the  following: Automated exposure control, adjustments of the mA and/or kV according  to patient's size, or use of iterative reconstruction technique. FINDINGS: Mild cardiomegaly. No pericardial effusion. Thoracic aorta without  aneurysm or dissection. No large central pulmonary arterial filling defects. Main pulmonary artery is larger than the aorta. No mediastinal or hilar  adenopathy. Thoracic esophagus is collapsed. No pleural effusion. Bilateral  lower lung airspace disease. Included imaging of the upper abdomen demonstrates  dense material in the colon causing streak artifact. No axillary adenopathy. Included thyroid unremarkable. Degenerative changes about the bony structures. Impression  1. No large central pulmonary embolus. Main pulmonary artery is larger than the  aorta suggesting pulmonary arterial hypertension. 2. Cardiomegaly. 3. Bilateral lower lung atelectasis or infiltrates. IMPRESSION:   1. Dyspnea  2. Acute hypoxic respiratory failure  3. Personal history of COVID-19 pneumonia  4.  Rule out obstructive sleep apnea  5. Chronic A. Fib on Eliquis  6. Hypertension hyperlipidemia  7. Pulmonary hypertension  8. Prognosis guarded       RECOMMENDATIONS/PLAN:     1. Her dyspnea is multifactorial secondary to underlying chronic A. fib pulmonary hypertension also possible sleep apnea and also she had history of COVID-19 pneumonia  2. CAT scan of the chest negative for pulmonary embolism atelectasis basal no lung mass  3. 2D echo shows normal ejection fraction  4. Patient on low-dose prednisone  5. She needs sleep study as an outpatient  6.  DVT, SUP prophylaxis               Paulo Kay MD

## 2021-10-09 NOTE — PROGRESS NOTES
Progress Note    Patient: Paul Lion MRN: [id number]  SSN: [id number]    YOB: 1950  Age: 66 y.o. Sex: female      Admit Date: 10/5/2021    LOS: 4 days     Subjective:     66y.o. year-old female with past medical history significant for hypertension, hyperlipidemia, and chronic atrial fibrillation who presented to ED for evaluation of atrial fibrillation  - no events overnight    Telemetry Review: SR    Review of Symptoms:   ROS      Objective:     Vitals:    10/09/21 0125 10/09/21 0801 10/09/21 0810 10/09/21 0828   BP:  (!) 149/76     Pulse:  96     Resp:  18     Temp:  97.7 °F (36.5 °C)     SpO2: 95% 96% 92% 92%   Weight:       Height:            Intake and Output:  Current Shift: No intake/output data recorded. Last three shifts: No intake/output data recorded. Physical Exam:   General: Well nourished  NAD, A&O  HEENT: Normocephalic, PERRL, no drainage  Neck: Supple, Trachea midline, No JVD  RESP: CTA bilaterally   + Symmetrical chest movement. No SOB or distress. On NC  Cardiovascular: RRR no MRG  PVS: No rubor, cyanosis, no edema, Radial, DP, PT pulses equal bilaterally  ABD: soft, NT, Normoactive BS  Derm: Warm/Dry/Intact with no lesions, normal turgor  Neuro: A&O PPTS, cranial nerves II- XII grossly intact via interaction with patient. No focal deficits  PSYCH: No anxiety or agitation      Lab/Data Review: All labs reviewed last 24 hrs    TTE  ·  LV: Estimated LVEF is 60 - 65%. Normal cavity size, wall thickness and systolic function (ejection fraction normal). Image quality for this study was technically difficult. . No Echocardiographic e/o pHTN        Current Facility-Administered Medications:     dilTIAZem ER (CARDIZEM CD) capsule 120 mg, 120 mg, Oral, DAILY, Israel Craig MD, 120 mg at 10/09/21 0817    predniSONE (DELTASONE) tablet 10 mg, 10 mg, Oral, DAILY WITH BREAKFAST, Moody Newsome MD, 10 mg at 10/09/21 0816    amLODIPine (NORVASC) tablet 10 mg, 10 mg, Oral, DAILY, Beverly Cerna MD, 10 mg at 10/08/21 2157    apixaban (ELIQUIS) tablet 5 mg, 5 mg, Oral, BID, Roman Cerna MD, 5 mg at 10/09/21 0816    chlorthalidone (HYGROTON) tablet 25 mg, 25 mg, Oral, DAILY, Cathi Noguera MD, 25 mg at 10/09/21 8471    gabapentin (NEURONTIN) capsule 100 mg, 100 mg, Oral, QHS, Beverly Cerna MD, 100 mg at 10/08/21 2158    glipiZIDE (GLUCOTROL) tablet 5 mg, 5 mg, Oral, DAILY, Cathi Noguera MD, 5 mg at 10/09/21 0816    magnesium oxide (MAG-OX) tablet 400 mg, 400 mg, Oral, DAILY, Cathi Noguera MD, 400 mg at 10/09/21 0816    oxybutynin (DITROPAN) tablet 5 mg, 5 mg, Oral, DAILY, Cathi Noguera MD, 5 mg at 10/09/21 0816    pravastatin (PRAVACHOL) tablet 80 mg, 80 mg, Oral, DAILY, Beverly Cerna MD, 80 mg at 10/09/21 0816    lisinopriL (PRINIVIL, ZESTRIL) tablet 40 mg, 40 mg, Oral, DAILY, Cathi Noguera MD, 40 mg at 10/09/21 0816    albuterol-ipratropium (DUO-NEB) 2.5 MG-0.5 MG/3 ML, 3 mL, Nebulization, Q6H RT, Cathi Noguera MD, 3 mL at 10/09/21 1415    insulin lispro (HUMALOG) injection, , SubCUTAneous, AC&HS, Cathi Noguera MD, 3 Units at 10/09/21 0817    glucose chewable tablet 16 g, 4 Tablet, Oral, PRN, Roman Cerna MD    dextrose (D50W) injection syrg 12.5-25 g, 25-50 mL, IntraVENous, PRN, Roman Cerna MD    glucagon (GLUCAGEN) injection 1 mg, 1 mg, IntraMUSCular, PRN, Roman Cerna MD    acetaminophen (TYLENOL) tablet 650 mg, 650 mg, Oral, Q6H PRN, 650 mg at 10/07/21 0443 **OR** acetaminophen (TYLENOL) suppository 650 mg, 650 mg, Rectal, Q6H PRN, Roman Cerna MD    polyethylene glycol (MIRALAX) packet 17 g, 17 g, Oral, DAILY PRN, Cathi Noguera MD, 17 g at 10/09/21 1016    ondansetron (ZOFRAN ODT) tablet 4 mg, 4 mg, Oral, Q8H PRN **OR** ondansetron (ZOFRAN) injection 4 mg, 4 mg, IntraVENous, Q6H PRN, Beverly Cerna MD    famotidine (PEPCID) tablet 40 mg, 40 mg, Oral, BID, Beverly Cerna MD, 40 mg at 10/09/21 0816      Assessment:     Active Problems:    Dyspnea (10/5/2021)      SOB (shortness of breath) (10/5/2021)        Plan:     Our impression and recommendations are as follows:     1. paroxsymal atrial fibrillation:   - Rate is controlled. Now in NSR. Patient is allergic to metoprolol.   - Started Cardizem 120 mg ER  - Her CHADS2-VASc score is 4 ; continue Eliquis Continue telemetry monitoring. Keep serum potassium between 4-5 and serum magnesium > 2.   - Recommend outpatient appointment to establish care.      2. Hypertension:   - blood pressure is at goal.   - Continue to monitor.     3. Acute hypoxic respiratory failure:  - CTA negative for PE  - no clinical sings of volume overload  - recommend sleep study as outpatient  - appreciate pulmonary recommendations        Thank you for involving us in the care of this patient. Please do not hesitate to call if additional questions arise.  If after hours please call Rae Barboza MD, Reinaldo Flores, [address]  Structural Heart Disease  Endovascular and Vascular Medicine  Interventional Cardiology  [facility]  [phone]

## 2021-10-09 NOTE — PROGRESS NOTES
Progress Note    Patient: Anastsaia Goldstein MRN: [id number]  SSN: [id number]    YOB: 1950  Age: 66 y.o.   Sex: female      Admit Date: 10/5/2021    LOS: 4 days     Subjective:   Examined, swallow better, less heartburn, has been discharged back home    Past Medical History:   Diagnosis Date    Diabetes ([facility] [address]     High cholesterol     Hypertension         Current Facility-Administered Medications:     dilTIAZem ER (CARDIZEM CD) capsule 120 mg, 120 mg, Oral, DAILY, Kieran Bingham MD, 120 mg at 10/09/21 0817    predniSONE (DELTASONE) tablet 10 mg, 10 mg, Oral, DAILY WITH BREAKFAST, Guerda Hyatt MD, 10 mg at 10/09/21 0816    amLODIPine (NORVASC) tablet 10 mg, 10 mg, Oral, DAILY, Beverly Cerna MD, 10 mg at 10/08/21 2157    apixaban (ELIQUIS) tablet 5 mg, 5 mg, Oral, BID, Donna Cook MD, 5 mg at 10/09/21 0816    chlorthalidone (HYGROTON) tablet 25 mg, 25 mg, Oral, DAILY, Donna Cook MD, 25 mg at 10/09/21 7749    gabapentin (NEURONTIN) capsule 100 mg, 100 mg, Oral, QHS, Beverly Cerna MD, 100 mg at 10/08/21 2158    glipiZIDE (GLUCOTROL) tablet 5 mg, 5 mg, Oral, DAILY, Donna Cook MD, 5 mg at 10/09/21 0816    magnesium oxide (MAG-OX) tablet 400 mg, 400 mg, Oral, DAILY, Beverly Cerna MD, 400 mg at 10/09/21 0816    oxybutynin (DITROPAN) tablet 5 mg, 5 mg, Oral, DAILY, Beverly Cerna MD, 5 mg at 10/09/21 0816    pravastatin (PRAVACHOL) tablet 80 mg, 80 mg, Oral, DAILY, Beverly Cerna MD, 80 mg at 10/09/21 0816    lisinopriL (PRINIVIL, ZESTRIL) tablet 40 mg, 40 mg, Oral, DAILY, Donna Cook MD, 40 mg at 10/09/21 0816    albuterol-ipratropium (DUO-NEB) 2.5 MG-0.5 MG/3 ML, 3 mL, Nebulization, Q6H RT, Beverly Cerna MD, 3 mL at 10/09/21 1415    insulin lispro (HUMALOG) injection, , SubCUTAneous, AC&HS, Beverly Cerna MD, 3 Units at 10/09/21 0817    glucose chewable tablet 16 g, 4 Tablet, Oral, PRN, Moe Cerna MD    dextrose (D50W) injection syrg 12.5-25 g, 25-50 mL, IntraVENous, PRN, Roman Cerna MD    glucagon [facility]) injection 1 mg, 1 mg, IntraMUSCular, PRN, Roman Cerna MD    acetaminophen (TYLENOL) tablet 650 mg, 650 mg, Oral, Q6H PRN, 650 mg at 10/07/21 0443 **OR** acetaminophen (TYLENOL) suppository 650 mg, 650 mg, Rectal, Q6H PRN, Roman Cerna MD    polyethylene glycol (MIRALAX) packet 17 g, 17 g, Oral, DAILY PRN, Beverly Cerna MD, 17 g at 10/09/21 1016    ondansetron (ZOFRAN ODT) tablet 4 mg, 4 mg, Oral, Q8H PRN **OR** ondansetron (ZOFRAN) injection 4 mg, 4 mg, IntraVENous, Q6H PRN, Beverly Cerna MD    famotidine (PEPCID) tablet 40 mg, 40 mg, Oral, BID, Beverly Cerna MD, 40 mg at 10/09/21 0816    Objective:     Vitals:    10/09/21 0125 10/09/21 0801 10/09/21 0810 10/09/21 0828   BP:  (!) 149/76     Pulse:  96     Resp:  18     Temp:  97.7 °F (36.5 °C)     SpO2: 95% 96% 92% 92%   Weight:       Height:            Intake and Output:  Current Shift: No intake/output data recorded. Last three shifts: No intake/output data recorded. Physical Exam:   Physical Exam  Constitutional:       Appearance: Normal appearance. HENT:      Head: Atraumatic. Mouth/Throat:      Mouth: Mucous membranes are dry. Eyes:      General:         Left eye: No discharge. Cardiovascular:      Pulses: Normal pulses. Pulmonary:      Effort: Pulmonary effort is normal.   Abdominal:      General: Abdomen is flat. Bowel sounds are normal.   Skin:     Coloration: Skin is not jaundiced. Findings: No bruising. Neurological:      Mental Status: Mental status is at baseline.           Lab/Data Review:  Recent Results (from the past 24 hour(s))   GLUCOSE, POC    Collection Time: 10/08/21  3:16 PM   Result Value Ref Range    Glucose (POC) 191 (H) 65 - 117 mg/dL    Performed by [address] [place] [address] [facility]    Collection Time: 10/08/21  8:11 PM   Result Value Ref Range    Glucose (POC) 129 (H) 65 - 117 mg/dL Performed by Vandana Saunders    GLUCOSE, POC    Collection Time: 10/09/21  7:59 AM   Result Value Ref Range    Glucose (POC) 141 (H) 65 - 117 mg/dL    Performed by Rigo Headley, POC    Collection Time: 10/09/21 12:25 PM   Result Value Ref Range    Glucose (POC) 113 65 - 117 mg/dL    Performed by Nita Urias         CTA CHEST W OR W WO CONT   Final Result   1. No large central pulmonary embolus. Main pulmonary artery is larger than the   aorta suggesting pulmonary arterial hypertension. 2. Cardiomegaly. 3. Bilateral lower lung atelectasis or infiltrates.       XR CHEST PORT   Final Result           Assessment:     Active Problems:    Dyspnea (10/5/2021)      SOB (shortness of breath) (10/5/2021)    History of COVID-19 infection, had difficult swallow, unknown etiology,  Dysmotility, there has been some report regarding COVID-19 infection induced esophageal dysmotility      versus if secondary infection  No evidence of CVA,  Has been on steroids for a long time,  Has been treated for acute dyspnea, atrial fibrillation,     Plan:   Continue on steroids,  Cardizem for heart rate control  PPI as ordered,             Signed By: Berhane Green MD     October 9, 2021        Thank you for allowing me to participate in this patients care  Cc Referring Physician   Shante Jansen MD

## 2021-10-09 NOTE — DISCHARGE SUMMARY
Discharge Summary       PATIENT ID: Jonna Newberry  MRN: [id number]   YOB: 1950    DATE OF ADMISSION: 10/5/2021  9:49 AM    DATE OF DISCHARGE:   PRIMARY CARE PROVIDER: Richard Gordon MD     ATTENDING PHYSICIAN: Deonna Cerna  DISCHARGING PROVIDER: Deonna Cerna      CONSULTATIONS: IP CONSULT TO PULMONOLOGY  IP CONSULT TO CARDIOLOGY  IP CONSULT TO GASTROENTEROLOGY    PROCEDURES/SURGERIES: * No surgery found *    ADMITTING DIAGNOSES:    Patient Active Problem List    Diagnosis Date Noted    Dyspnea 10/05/2021    SOB (shortness of breath) 10/05/2021       DISCHARGE DIAGNOSES / PLAN:      Acute dyspnea  History of COVID-19  Type 2 diabetes  Hypertension  Hyperlipidemia  History of A. Fib  Pulmonary hypertension  · LV: Estimated LVEF is 60 - 65%. Normal cavity size, wall thickness and systolic function (ejection fraction normal). DISCHARGE MEDICATIONS:  Current Discharge Medication List      START taking these medications    Details   dilTIAZem ER (CARDIZEM CD) 120 mg capsule Take 1 Capsule by mouth daily. Qty: 30 Capsule, Refills: 1  Start date: 10/9/2021      famotidine (PEPCID) 40 mg tablet Take 1 Tablet by mouth two (2) times a day. Qty: 60 Tablet, Refills: 0  Start date: 10/8/2021      predniSONE (DELTASONE) 10 mg tablet Take 10 mg by mouth daily (with breakfast). Qty: 5 Tablet, Refills: 0  Start date: 10/9/2021         CONTINUE these medications which have NOT CHANGED    Details   apixaban (Eliquis) 5 mg tablet Eliquis 5 mg tablet   Take 1 tablet twice a day by oral route.       glipiZIDE SR (GLUCOTROL XL) 5 mg CR tablet glipizide ER 5 mg tablet, extended release 24 hr      amLODIPine (NORVASC) 10 mg tablet amlodipine 10 mg tablet      pravastatin (PRAVACHOL) 80 mg tablet pravastatin 80 mg tablet      ramipriL (ALTACE) 10 mg capsule       chlorthalidone (HYGROTON) 25 mg tablet chlorthalidone 25 mg tablet   TAKE 1 TABLET BY MOUTH EVERY DAY      gabapentin (NEURONTIN) 100 mg capsule gabapentin 100 mg capsule   TAKE 1 CAPSULE AT BEDTIME THEN SLOWLY INCREASE TO 1 CAPSULE 3 TIMES A DAY      magnesium oxide (MAG-OX) 400 mg tablet magnesium oxide 400 mg (241.3 mg magnesium) tablet   Take 1 tablet every day by oral route. oxybutynin (DITROPAN) 5 mg tablet 1 TABLET AT BEDTIME ORALLY ONCE A DAY 10 DAYS         STOP taking these medications       albuterol (PROVENTIL HFA, VENTOLIN HFA, PROAIR HFA) 90 mcg/actuation inhaler Comments:   Reason for Stopping:         albuterol (PROVENTIL HFA, VENTOLIN HFA, PROAIR HFA) 90 mcg/actuation inhaler Comments:   Reason for Stopping:                 NOTIFY YOUR PHYSICIAN FOR ANY OF THE FOLLOWING:   Fever over 101 degrees for 24 hours. Chest pain, shortness of breath, fever, chills, nausea, vomiting, diarrhea, change in mentation, falling, weakness, bleeding. Severe pain or pain not relieved by medications. Or, any other signs or symptoms that you may have questions about. DISPOSITION:  x  Home With:   OT  PT  HH  RN       Long term SNF/Inpatient Rehab    Independent/assisted living    Hospice    Other:       PATIENT CONDITION AT DISCHARGE: Stable      PHYSICAL EXAMINATION AT DISCHARGE:  General:          Alert, cooperative, no distress, appears stated age. HEENT:           Atraumatic, anicteric sclerae, pink conjunctivae                          No oral ulcers, mucosa moist, throat clear, dentition fair  Neck:               Supple, symmetrical  Lungs:             Clear to auscultation bilaterally. No Wheezing or Rhonchi. No rales. Chest wall:      No tenderness  No Accessory muscle use. Heart:              Regular  rhythm,  No  murmur   No edema  Abdomen:        Soft, non-tender. Not distended. Bowel sounds normal  Extremities:     No cyanosis. No clubbing,                            Skin turgor normal, Capillary refill normal  Skin:                Not pale.   Not Jaundiced  No rashes   Psych:             Not anxious or agitated. Neurologic:      Alert, moves all extremities, answers questions appropriately and responds to commands     CTA CHEST W OR W WO CONT   Final Result   1. No large central pulmonary embolus. Main pulmonary artery is larger than the   aorta suggesting pulmonary arterial hypertension. 2. Cardiomegaly. 3. Bilateral lower lung atelectasis or infiltrates. XR CHEST PORT   Final Result           Recent Results (from the past 24 hour(s))   GLUCOSE, POC    Collection Time: 10/08/21  3:16 PM   Result Value Ref Range    Glucose (POC) 191 (H) 65 - 117 mg/dL    Performed by Eugenia Ana Lilia Margarito, POC    Collection Time: 10/08/21  8:11 PM   Result Value Ref Range    Glucose (POC) 129 (H) 65 - 117 mg/dL    Performed by Amina Pedro, POC    Collection Time: 10/09/21  7:59 AM   Result Value Ref Range    Glucose (POC) 141 (H) 65 - 117 mg/dL    Performed by Calderon Encinas, POC    Collection Time: 10/09/21 12:25 PM   Result Value Ref Range    Glucose (POC) 113 65 - 117 mg/dL    Performed by [address]:    Patient is a 65 y. o. year old female past medical history of diabetes hypertension hyperlipidemia chronic A. fib came to emergency room because of shortness of breath and dyspnea according the patient patient have Covid 19 in April since then patient feeling weak tired seen in the ER 3 times but recently getting more worse came to the emergency room seen by the ER physician    History of present illness precipitation physical at the time of admission with acute shortness of breath history of COVID-19 patient have echo done shows ejection fraction was 66 5% patient seen by PT OT and recommended inpatient rehab discharge patient have difficulty in swallowing seen by the speech therapist in the GI recommend upper GI series if able symptoms persist    Otherwise patient was alert awake denies any chest pain shortness of breath nausea vomiting discharged in stable condition      Signed:   Mika Graves MD  10/9/2021  2:04 PM

## 2021-10-10 LAB
GLUCOSE BLD STRIP.AUTO-MCNC: 128 MG/DL (ref 65–117)
GLUCOSE BLD STRIP.AUTO-MCNC: 134 MG/DL (ref 65–117)
GLUCOSE BLD STRIP.AUTO-MCNC: 182 MG/DL (ref 65–117)
GLUCOSE BLD STRIP.AUTO-MCNC: 199 MG/DL (ref 65–117)
PERFORMED BY, TECHID: ABNORMAL

## 2021-10-10 PROCEDURE — 74011636637 HC RX REV CODE- 636/637: Performed by: INTERNAL MEDICINE

## 2021-10-10 PROCEDURE — 97530 THERAPEUTIC ACTIVITIES: CPT

## 2021-10-10 PROCEDURE — 65270000029 HC RM PRIVATE

## 2021-10-10 PROCEDURE — 74011250637 HC RX REV CODE- 250/637: Performed by: FAMILY MEDICINE

## 2021-10-10 PROCEDURE — 94640 AIRWAY INHALATION TREATMENT: CPT

## 2021-10-10 PROCEDURE — 74011000250 HC RX REV CODE- 250: Performed by: FAMILY MEDICINE

## 2021-10-10 PROCEDURE — 74011250637 HC RX REV CODE- 250/637: Performed by: INTERNAL MEDICINE

## 2021-10-10 PROCEDURE — 74011636637 HC RX REV CODE- 636/637: Performed by: FAMILY MEDICINE

## 2021-10-10 PROCEDURE — 82962 GLUCOSE BLOOD TEST: CPT

## 2021-10-10 RX ORDER — IPRATROPIUM BROMIDE AND ALBUTEROL SULFATE 2.5; .5 MG/3ML; MG/3ML
3 SOLUTION RESPIRATORY (INHALATION)
Status: DISCONTINUED | OUTPATIENT
Start: 2021-10-10 | End: 2021-10-11 | Stop reason: HOSPADM

## 2021-10-10 RX ADMIN — Medication 400 MG: at 08:59

## 2021-10-10 RX ADMIN — INSULIN LISPRO 3 UNITS: 100 INJECTION, SOLUTION INTRAVENOUS; SUBCUTANEOUS at 11:38

## 2021-10-10 RX ADMIN — GLIPIZIDE 5 MG: 5 TABLET ORAL at 08:58

## 2021-10-10 RX ADMIN — GABAPENTIN 100 MG: 100 CAPSULE ORAL at 21:04

## 2021-10-10 RX ADMIN — IPRATROPIUM BROMIDE AND ALBUTEROL SULFATE 3 ML: .5; 2.5 SOLUTION RESPIRATORY (INHALATION) at 06:11

## 2021-10-10 RX ADMIN — FAMOTIDINE 40 MG: 20 TABLET ORAL at 08:57

## 2021-10-10 RX ADMIN — OXYBUTYNIN CHLORIDE 5 MG: 5 TABLET ORAL at 08:58

## 2021-10-10 RX ADMIN — LISINOPRIL 40 MG: 40 TABLET ORAL at 08:57

## 2021-10-10 RX ADMIN — FAMOTIDINE 40 MG: 20 TABLET ORAL at 21:04

## 2021-10-10 RX ADMIN — APIXABAN 5 MG: 5 TABLET, FILM COATED ORAL at 21:04

## 2021-10-10 RX ADMIN — PRAVASTATIN SODIUM 80 MG: 20 TABLET ORAL at 08:57

## 2021-10-10 RX ADMIN — PREDNISONE 10 MG: 5 TABLET ORAL at 08:58

## 2021-10-10 RX ADMIN — DILTIAZEM HYDROCHLORIDE 120 MG: 120 CAPSULE, COATED, EXTENDED RELEASE ORAL at 08:59

## 2021-10-10 RX ADMIN — APIXABAN 5 MG: 5 TABLET, FILM COATED ORAL at 08:59

## 2021-10-10 RX ADMIN — POLYETHYLENE GLYCOL 3350 17 G: 17 POWDER, FOR SOLUTION ORAL at 12:17

## 2021-10-10 RX ADMIN — CHLORTHALIDONE 25 MG: 25 TABLET ORAL at 09:14

## 2021-10-10 NOTE — PROGRESS NOTES
DC order noted. Chart reviewed. Per previous documentation, the pt is waiting insurance authorization for continued care at [facility]. CM name and # provided via [facility] for direct call use by [facility] Representative this date. Will update as additional information becomes available.   Delay entered

## 2021-10-10 NOTE — PROGRESS NOTES
DC order noted. Chart reviewed. Per previous documentation, the pt is waiting insurance authorization for continued care at [facility]. Will update as additional information becomes available.   Delay entered

## 2021-10-10 NOTE — PROGRESS NOTES
Problem: Mobility Impaired (Adult and Pediatric)  Goal: *Acute Goals and Plan of Care (Insert Text)  Description: Pt will be I with LE HEP in 7 days. Pt will perform bed mobility with mod I in 7 days. Pt will perform transfers with mod I in 7 days. Pt will amb  feet with LRAD safely with mod I in 7 days. Outcome: Progressing Towards Goal   PHYSICAL THERAPY TREATMENT  Patient: Raad Arredondo (84 y.o. female)  Date: 10/10/2021  Diagnosis: Dyspnea [R06.00]  SOB (shortness of breath) [R06.02] <principal problem not specified>       Precautions: Fall  Chart, physical therapy assessment, plan of care and goals were reviewed. ASSESSMENT  Patient continues with skilled PT services and is progressing towards goals. Patient in semi-supine upon entry into room and agreeable to work with PT. Bed mobility performed SBA and incr time. No dizziness with positional changes. She performed gait training around room with RW for 50 feet and SBA for safety but no LOB and good navigation of obstacles and turns in small space. Patient then transferred to chair at bedside and performed LE therex in sitting with rest breaks throughout due to SOB. Patient left sitting in chair with all needs in reach and tray table positioned in front  of patient so she could sit up for lunch. She will benefit from continued skilled therapy for improving strength and endurance with gait training to decrease assistance from caregivers and return to PLOF. Current Level of Function Impacting Discharge (mobility/balance): decr mobility, decr endurance, gait for AD    Other factors to consider for discharge: lives alone but is planning to stay with daughter after rehab          PLAN :  Patient continues to benefit from skilled intervention to address the above impairments. Continue treatment per established plan of care. to address goals.     Recommendation for discharge: (in order for the patient to meet his/her long term goals)  Inpatient Rehabilitation Facility     This discharge recommendation:  Has been made in collaboration with the attending provider and/or case management    IF patient discharges home will need the following DME: to be determined (TBD)       SUBJECTIVE:   Patient stated I guess it would be good for me to get out of this bed for a little while.     OBJECTIVE DATA SUMMARY:   Critical Behavior:  Neurologic State: Alert  Orientation Level: Oriented X4  Cognition: Appropriate decision making, Follows commands  Safety/Judgement: Fall prevention, Home safety  Functional Mobility Training:  Bed Mobility:  Rolling: Stand-by assistance  Supine to Sit: Stand-by assistance     Scooting: Stand-by assistance        Transfers:  Sit to Stand: Stand-by assistance  Stand to Sit: Stand-by assistance        Bed to Chair: Stand-by assistance                    Balance:  Sitting: Intact  Standing: Impaired; Without support  Standing - Static: Good;Constant support  Standing - Dynamic : Good;Constant support  Ambulation/Gait Training:  Distance (ft): 50 Feet (ft)  Assistive Device: Gait belt;Walker, rolling  Ambulation - Level of Assistance: Stand-by assistance    Base of Support: Widened     Speed/Gely: Slow;Shuffled      Therapeutic Exercises:   2 x 10 with patient seated in chair: LAQ, marching, heel raises, toe raises - rest breaks throughout due to SOB  Pain Rating:  No pain     Activity Tolerance:   Good, SpO2 stable on RA, requires rest breaks, and observed SOB with activity  Please refer to the flowsheet for vital signs taken during this treatment. After treatment patient left in no apparent distress:   Sitting in chair and Call bell within reach    COMMUNICATION/COLLABORATION:   The patients plan of care was discussed with: Physical therapist and Certified nursing assistant/patient care technician.      Ana Harry   Time Calculation: 24 mins

## 2021-10-10 NOTE — PROGRESS NOTES
Progress Note    Patient: Meghan Colunga MRN: [id number]  SSN: [id number]    YOB: 1950  Age: 66 y.o. Sex: female      Admit Date: 10/5/2021    LOS: 5 days     Subjective:     66y.o. year-old female with past medical history significant for hypertension, hyperlipidemia, and chronic atrial fibrillation who presented to ED for evaluation of atrial fibrillation  - no events overnight    Telemetry Review: SR    Review of Symptoms:   ROS      Objective:     Vitals:    10/09/21 2058 10/09/21 2140 10/10/21 0024 10/10/21 0800   BP:  (!) 150/63 136/70 (!) 143/66   Pulse:  74 93 84   Resp:    20   Temp:  98.1 °F (36.7 °C) 97.8 °F (36.6 °C) 97.8 °F (36.6 °C)   SpO2: 97% 95% 97% 97%   Weight:       Height:            Intake and Output:  Current Shift: No intake/output data recorded. Last three shifts: No intake/output data recorded. Physical Exam:   General: Well nourished  NAD, A&O  HEENT: Normocephalic, PERRL, no drainage  Neck: Supple, Trachea midline, No JVD  RESP: CTA bilaterally   + Symmetrical chest movement. No SOB or distress. On NC  Cardiovascular: RRR no MRG  PVS: No rubor, cyanosis, no edema, Radial, DP, PT pulses equal bilaterally  ABD: soft, NT, Normoactive BS  Derm: Warm/Dry/Intact with no lesions, normal turgor  Neuro: A&O PPTS, cranial nerves II- XII grossly intact via interaction with patient. No focal deficits  PSYCH: No anxiety or agitation      Lab/Data Review: All labs reviewed last 24 hrs    TTE  ·  LV: Estimated LVEF is 60 - 65%. Normal cavity size, wall thickness and systolic function (ejection fraction normal). Image quality for this study was technically difficult. . No Echocardiographic e/o pHTN        Current Facility-Administered Medications:     dilTIAZem ER (CARDIZEM CD) capsule 120 mg, 120 mg, Oral, DAILY, Israel Craig MD, 120 mg at 10/10/21 0859    predniSONE (DELTASONE) tablet 10 mg, 10 mg, Oral, DAILY WITH BREAKFAST, [place], MD, 10 mg at 10/10/21 6171    amLODIPine (NORVASC) tablet 10 mg, 10 mg, Oral, DAILY, Beverly Cerna MD, 10 mg at 10/09/21 2144    apixaban (ELIQUIS) tablet 5 mg, 5 mg, Oral, BID, Arlen Cerna MD, 5 mg at 10/10/21 0859    chlorthalidone (HYGROTON) tablet 25 mg, 25 mg, Oral, DAILY, Beverly Cerna MD, 25 mg at 10/10/21 5194    gabapentin (NEURONTIN) capsule 100 mg, 100 mg, Oral, QHS, Beverly Cerna MD, 100 mg at 10/09/21 2144    glipiZIDE (GLUCOTROL) tablet 5 mg, 5 mg, Oral, DAILY, Beverly Cerna MD, 5 mg at 10/10/21 0858    magnesium oxide (MAG-OX) tablet 400 mg, 400 mg, Oral, DAILY, Beverly Cerna MD, 400 mg at 10/10/21 0859    oxybutynin (DITROPAN) tablet 5 mg, 5 mg, Oral, DAILY, Beverly Cerna MD, 5 mg at 10/10/21 0858    pravastatin (PRAVACHOL) tablet 80 mg, 80 mg, Oral, DAILY, Beverly Cerna MD, 80 mg at 10/10/21 0857    lisinopriL (PRINIVIL, ZESTRIL) tablet 40 mg, 40 mg, Oral, DAILY, Beverly Cerna MD, 40 mg at 10/10/21 0857    albuterol-ipratropium (DUO-NEB) 2.5 MG-0.5 MG/3 ML, 3 mL, Nebulization, Q6H RT, Beverly Cerna MD, 3 mL at 10/10/21 2284    insulin lispro (HUMALOG) injection, , SubCUTAneous, AC&HS, Beverly Cerna MD, 3 Units at 10/10/21 1138    glucose chewable tablet 16 g, 4 Tablet, Oral, PRN, Arlen Cerna MD    dextrose (D50W) injection syrg 12.5-25 g, 25-50 mL, IntraVENous, PRN, Arlen Cerna MD    glucagon (GLUCAGEN) injection 1 mg, 1 mg, IntraMUSCular, PRN, Arlen Cerna MD    acetaminophen (TYLENOL) tablet 650 mg, 650 mg, Oral, Q6H PRN, 650 mg at 10/07/21 0443 **OR** acetaminophen (TYLENOL) suppository 650 mg, 650 mg, Rectal, Q6H PRN, Arlen Cerna MD    polyethylene glycol (MIRALAX) packet 17 g, 17 g, Oral, DAILY PRN, Beverly Cerna MD, 17 g at 10/09/21 1016    ondansetron (ZOFRAN ODT) tablet 4 mg, 4 mg, Oral, Q8H PRN **OR** ondansetron (ZOFRAN) injection 4 mg, 4 mg, IntraVENous, Q6H PRN, Beverly Cerna MD    famotidine (PEPCID) tablet 40 mg, 40 mg, Oral, BID, Beverly Cerna MD, 40 mg at 10/10/21 6845      Assessment:     Active Problems:    Dyspnea (10/5/2021)      SOB (shortness of breath) (10/5/2021)        Plan:     Our impression and recommendations are as follows:     1. paroxsymal atrial fibrillation:   - Rate is controlled. Now in NSR. Patient is allergic to metoprolol.   - Started Cardizem 120 mg ER  - Her CHADS2-VASc score is 4 ; continue Eliquis Continue telemetry monitoring. Keep serum potassium between 4-5 and serum magnesium > 2.   - Recommend outpatient appointment to establish care.      2. Hypertension:   - blood pressure is at goal.   - Continue to monitor.     3. Acute hypoxic respiratory failure:  - CTA negative for PE  - no clinical sings of volume overload  - recommend sleep study as outpatient  - appreciate pulmonary recommendations        Thank you for involving us in the care of this patient. Please do not hesitate to call if additional questions arise.  If after hours please call Rae Barboza MD, [facility], [address]  Structural Heart Disease  Endovascular and Vascular Medicine  Interventional Cardiology  [facility]  [phone]

## 2021-10-10 NOTE — DISCHARGE SUMMARY
Discharge Summary       PATIENT ID: Darby Chavez  MRN: [id number]   YOB: 1950    DATE OF ADMISSION: 10/5/2021  9:49 AM    DATE OF DISCHARGE:   PRIMARY CARE PROVIDER: Dexter Solorzano MD     ATTENDING PHYSICIAN: Quiana Cerna  DISCHARGING PROVIDER: Quiana Cerna      CONSULTATIONS: IP CONSULT TO PULMONOLOGY  IP CONSULT TO CARDIOLOGY  IP CONSULT TO GASTROENTEROLOGY    PROCEDURES/SURGERIES: * No surgery found *    ADMITTING DIAGNOSES:    Patient Active Problem List    Diagnosis Date Noted    Dyspnea 10/05/2021    SOB (shortness of breath) 10/05/2021       DISCHARGE DIAGNOSES / PLAN:      Acute dyspnea  History of COVID-19  Type 2 diabetes  Hypertension  Hyperlipidemia  History of A. Fib  Pulmonary hypertension  · LV: Estimated LVEF is 60 - 65%. Normal cavity size, wall thickness and systolic function (ejection fraction normal). DISCHARGE MEDICATIONS:  Current Discharge Medication List      START taking these medications    Details   dilTIAZem ER (CARDIZEM CD) 120 mg capsule Take 1 Capsule by mouth daily. Qty: 30 Capsule, Refills: 1  Start date: 10/9/2021      famotidine (PEPCID) 40 mg tablet Take 1 Tablet by mouth two (2) times a day. Qty: 60 Tablet, Refills: 0  Start date: 10/8/2021      predniSONE (DELTASONE) 10 mg tablet Take 10 mg by mouth daily (with breakfast). Qty: 5 Tablet, Refills: 0  Start date: 10/9/2021         CONTINUE these medications which have NOT CHANGED    Details   apixaban (Eliquis) 5 mg tablet Eliquis 5 mg tablet   Take 1 tablet twice a day by oral route.       glipiZIDE SR (GLUCOTROL XL) 5 mg CR tablet glipizide ER 5 mg tablet, extended release 24 hr      amLODIPine (NORVASC) 10 mg tablet amlodipine 10 mg tablet      pravastatin (PRAVACHOL) 80 mg tablet pravastatin 80 mg tablet      ramipriL (ALTACE) 10 mg capsule       chlorthalidone (HYGROTON) 25 mg tablet chlorthalidone 25 mg tablet   TAKE 1 TABLET BY MOUTH EVERY DAY      gabapentin (NEURONTIN) 100 mg capsule gabapentin 100 mg capsule   TAKE 1 CAPSULE AT BEDTIME THEN SLOWLY INCREASE TO 1 CAPSULE 3 TIMES A DAY      magnesium oxide (MAG-OX) 400 mg tablet magnesium oxide 400 mg (241.3 mg magnesium) tablet   Take 1 tablet every day by oral route. oxybutynin (DITROPAN) 5 mg tablet 1 TABLET AT BEDTIME ORALLY ONCE A DAY 10 DAYS         STOP taking these medications       albuterol (PROVENTIL HFA, VENTOLIN HFA, PROAIR HFA) 90 mcg/actuation inhaler Comments:   Reason for Stopping:         albuterol (PROVENTIL HFA, VENTOLIN HFA, PROAIR HFA) 90 mcg/actuation inhaler Comments:   Reason for Stopping:                 NOTIFY YOUR PHYSICIAN FOR ANY OF THE FOLLOWING:   Fever over 101 degrees for 24 hours. Chest pain, shortness of breath, fever, chills, nausea, vomiting, diarrhea, change in mentation, falling, weakness, bleeding. Severe pain or pain not relieved by medications. Or, any other signs or symptoms that you may have questions about. DISPOSITION:  x  Home With:   OT  PT  HH  RN       Long term SNF/Inpatient Rehab    Independent/assisted living    Hospice    Other:       PATIENT CONDITION AT DISCHARGE: Stable      PHYSICAL EXAMINATION AT DISCHARGE:  General:          Alert, cooperative, no distress, appears stated age. HEENT:           Atraumatic, anicteric sclerae, pink conjunctivae                          No oral ulcers, mucosa moist, throat clear, dentition fair  Neck:               Supple, symmetrical  Lungs:             Clear to auscultation bilaterally. No Wheezing or Rhonchi. No rales. Chest wall:      No tenderness  No Accessory muscle use. Heart:              Regular  rhythm,  No  murmur   No edema  Abdomen:        Soft, non-tender. Not distended. Bowel sounds normal  Extremities:     No cyanosis. No clubbing,                            Skin turgor normal, Capillary refill normal  Skin:                Not pale.   Not Jaundiced  No rashes   Psych:             Not anxious or agitated. Neurologic:      Alert, moves all extremities, answers questions appropriately and responds to commands     CTA CHEST W OR W WO CONT   Final Result   1. No large central pulmonary embolus. Main pulmonary artery is larger than the   aorta suggesting pulmonary arterial hypertension. 2. Cardiomegaly. 3. Bilateral lower lung atelectasis or infiltrates. XR CHEST PORT   Final Result           Recent Results (from the past 24 hour(s))   GLUCOSE, POC    Collection Time: 10/09/21  5:41 PM   Result Value Ref Range    Glucose (POC) 224 (H) 65 - 117 mg/dL    Performed by Stanley Chatman, POC    Collection Time: 10/09/21  9:37 PM   Result Value Ref Range    Glucose (POC) 149 (H) 65 - 117 mg/dL    Performed by Jadon Fischer, POC    Collection Time: 10/10/21  7:58 AM   Result Value Ref Range    Glucose (POC) 128 (H) 65 - 117 mg/dL    Performed by Kendall Hoskins, POC    Collection Time: 10/10/21 11:16 AM   Result Value Ref Range    Glucose (POC) 182 (H) 65 - 117 mg/dL    Performed by [address] [place] [address] Drive:    Patient is a 65 y. o. year old female past medical history of diabetes hypertension hyperlipidemia chronic A. fib came to emergency room because of shortness of breath and dyspnea according the patient patient have Covid 19 in April since then patient feeling weak tired seen in the ER 3 times but recently getting more worse came to the emergency room seen by the ER physician    History of present illness precipitation physical at the time of admission with acute shortness of breath history of COVID-19 patient have echo done shows ejection fraction was 66 5% patient seen by PT OT and recommended inpatient rehab discharge patient have difficulty in swallowing seen by the speech therapist in the GI recommend upper GI series if able symptoms persist    Otherwise patient was alert awake denies any chest pain shortness of breath nausea vomiting discharged in stable condition    Waiting for placement      Signed:   Crystal Hi MD  10/10/2021  2:04 PM

## 2021-10-11 VITALS
RESPIRATION RATE: 18 BRPM | WEIGHT: 236.99 LBS | BODY MASS INDEX: 39.49 KG/M2 | SYSTOLIC BLOOD PRESSURE: 121 MMHG | OXYGEN SATURATION: 95 % | DIASTOLIC BLOOD PRESSURE: 68 MMHG | HEIGHT: 65 IN | HEART RATE: 75 BPM | TEMPERATURE: 97.9 F

## 2021-10-11 LAB
GLUCOSE BLD STRIP.AUTO-MCNC: 121 MG/DL (ref 65–117)
GLUCOSE BLD STRIP.AUTO-MCNC: 226 MG/DL (ref 65–117)
PERFORMED BY, TECHID: ABNORMAL
PERFORMED BY, TECHID: ABNORMAL

## 2021-10-11 PROCEDURE — 74011000250 HC RX REV CODE- 250: Performed by: INTERNAL MEDICINE

## 2021-10-11 PROCEDURE — 74011636637 HC RX REV CODE- 636/637: Performed by: FAMILY MEDICINE

## 2021-10-11 PROCEDURE — 74011636637 HC RX REV CODE- 636/637: Performed by: INTERNAL MEDICINE

## 2021-10-11 PROCEDURE — 74011250637 HC RX REV CODE- 250/637: Performed by: FAMILY MEDICINE

## 2021-10-11 PROCEDURE — 94640 AIRWAY INHALATION TREATMENT: CPT

## 2021-10-11 PROCEDURE — 82962 GLUCOSE BLOOD TEST: CPT

## 2021-10-11 PROCEDURE — 74011250637 HC RX REV CODE- 250/637: Performed by: INTERNAL MEDICINE

## 2021-10-11 PROCEDURE — 97116 GAIT TRAINING THERAPY: CPT

## 2021-10-11 PROCEDURE — 92526 ORAL FUNCTION THERAPY: CPT

## 2021-10-11 RX ADMIN — OXYBUTYNIN CHLORIDE 5 MG: 5 TABLET ORAL at 08:51

## 2021-10-11 RX ADMIN — PREDNISONE 10 MG: 5 TABLET ORAL at 08:51

## 2021-10-11 RX ADMIN — APIXABAN 5 MG: 5 TABLET, FILM COATED ORAL at 08:51

## 2021-10-11 RX ADMIN — PRAVASTATIN SODIUM 80 MG: 20 TABLET ORAL at 08:51

## 2021-10-11 RX ADMIN — Medication 400 MG: at 08:51

## 2021-10-11 RX ADMIN — GLIPIZIDE 5 MG: 5 TABLET ORAL at 08:51

## 2021-10-11 RX ADMIN — FAMOTIDINE 40 MG: 20 TABLET ORAL at 08:51

## 2021-10-11 RX ADMIN — INSULIN LISPRO 4 UNITS: 100 INJECTION, SOLUTION INTRAVENOUS; SUBCUTANEOUS at 11:30

## 2021-10-11 RX ADMIN — CHLORTHALIDONE 25 MG: 25 TABLET ORAL at 08:51

## 2021-10-11 RX ADMIN — IPRATROPIUM BROMIDE AND ALBUTEROL SULFATE 3 ML: .5; 2.5 SOLUTION RESPIRATORY (INHALATION) at 04:33

## 2021-10-11 RX ADMIN — DILTIAZEM HYDROCHLORIDE 120 MG: 120 CAPSULE, COATED, EXTENDED RELEASE ORAL at 08:51

## 2021-10-11 RX ADMIN — LISINOPRIL 40 MG: 40 TABLET ORAL at 08:51

## 2021-10-11 NOTE — PROGRESS NOTES
*ATTENTION:  This note has been created by a medical student for educational purposes only. Please do not refer to the content of this note for clinical decision-making, billing, or other purposes. Please see attending physicians note to obtain clinical information on this patient. *       Hospitalist Progress Note    Subjective:   Daily Progress Note: 10/11/2021 10:33 AM    Patient is C 73 y. o. year old female past medical history of diabetes hypertension hyperlipidemia chronic A. fib came to emergency room because of shortness of breath and dyspnea according the patient patient have Covid 19 in April since then patient feeling weak tired seen in the ER 3 times but recently getting more worse came to the emergency room seen by the ER physician     10/11  Patient seen today resting in bed. She states she had an acute episode of shortness of breathe that required nebulizer treatment to alleviate symptoms. She says she has been feeling much better since then. She admits to dyspnea on exertion, orthopnea, dysphagia, fatigue, constipation with last bowel movement a week ago. She denies chest pain, abdominal pain, diarrhea. Seen by GI for dysphagia.   Authorization still pending for [facility]    Current Facility-Administered Medications   Medication Dose Route Frequency    albuterol-ipratropium (DUO-NEB) 2.5 MG-0.5 MG/3 ML  3 mL Nebulization Q6H PRN    dilTIAZem ER (CARDIZEM CD) capsule 120 mg  120 mg Oral DAILY    predniSONE (DELTASONE) tablet 10 mg  10 mg Oral DAILY WITH BREAKFAST    amLODIPine (NORVASC) tablet 10 mg  10 mg Oral DAILY    apixaban (ELIQUIS) tablet 5 mg  5 mg Oral BID    chlorthalidone (HYGROTON) tablet 25 mg  25 mg Oral DAILY    gabapentin (NEURONTIN) capsule 100 mg  100 mg Oral QHS    glipiZIDE (GLUCOTROL) tablet 5 mg  5 mg Oral DAILY    magnesium oxide (MAG-OX) tablet 400 mg  400 mg Oral DAILY    oxybutynin (DITROPAN) tablet 5 mg  5 mg Oral DAILY    pravastatin (PRAVACHOL) tablet 80 mg 80 mg Oral DAILY    lisinopriL (PRINIVIL, ZESTRIL) tablet 40 mg  40 mg Oral DAILY    insulin lispro (HUMALOG) injection   SubCUTAneous AC&HS    glucose chewable tablet 16 g  4 Tablet Oral PRN    dextrose (D50W) injection syrg 12.5-25 g  25-50 mL IntraVENous PRN    glucagon (GLUCAGEN) injection 1 mg  1 mg IntraMUSCular PRN    acetaminophen (TYLENOL) tablet 650 mg  650 mg Oral Q6H PRN    Or    acetaminophen (TYLENOL) suppository 650 mg  650 mg Rectal Q6H PRN    polyethylene glycol (MIRALAX) packet 17 g  17 g Oral DAILY PRN    ondansetron (ZOFRAN ODT) tablet 4 mg  4 mg Oral Q8H PRN    Or    ondansetron (ZOFRAN) injection 4 mg  4 mg IntraVENous Q6H PRN    famotidine (PEPCID) tablet 40 mg  40 mg Oral BID          Objective:     Visit Vitals  BP (!) 121/59 (BP 1 Location: Right upper arm, BP Patient Position: At rest) Comment: primary nurse notified    Pulse 81   Temp 98.4 °F (36.9 °C)   Resp 18   Ht 5' 4.5\" (1.638 m)   Wt 236 lb 15.9 oz (107.5 kg)   SpO2 94%   BMI 40.05 kg/m²    O2 Flow Rate (L/min): 1 l/min O2 Device: None (Room air)    Temp (24hrs), Av.2 °F (36.8 °C), Min:97.9 °F (36.6 °C), Max:98.4 °F (36.9 °C)      No intake/output data recorded. 10/09 1901 - 10/11 0700  In: 0 [P.O.:640]  Out: -     Physical Exam:  Constitutional: Alert and awake  HENT:  Head: Normocephalic and atraumatic. Eyes: Pupils are equal, round, and reactive to light. EOM are normal.   Cardiovascular: Normal rate, regular rhythm and normal heart sounds. Pulmonary/Chest: Breath sounds normal. No wheezes. No rales. Exhibits no tenderness. Abdominal: Soft. Bowel sounds are normal. There is no abdominal tenderness. There is no rebound and no guarding. Musculoskeletal: Normal range of motion. Neurological: apporiate mood and judgement   Skin:       Data Review    24 hrs labs reviewed.        Recent Results (from the past 24 hour(s))   GLUCOSE, POC    Collection Time: 10/10/21 11:16 AM   Result Value Ref Range Glucose (POC) 182 (H) 65 - 117 mg/dL    Performed by Kendall Hoskins, POC    Collection Time: 10/10/21  4:20 PM   Result Value Ref Range    Glucose (POC) 134 (H) 65 - 117 mg/dL    Performed by [address], POC    Collection Time: 10/10/21  7:31 PM   Result Value Ref Range    Glucose (POC) 199 (H) 65 - 117 mg/dL    Performed by Dinesh Boggs    GLUCOSE, POC    Collection Time: 10/11/21  7:18 AM   Result Value Ref Range    Glucose (POC) 121 (H) 65 - 117 mg/dL    Performed by Saul Rolon          Assessment/Plan:     Acute dyspnea  History of COVID-19  Type 2 diabetes  Hypertension  Hyperlipidemia  History of A. Fib  Pulmonary hypertension  · LV: Estimated LVEF is 60 - 65%. Normal cavity size, wall thickness and systolic function (ejection fraction normal). PLAN        PT OT consult and then discharge discussed with the patient daughter at the bedside     Per GI:   -Cardizem for HR control  -PPI increased to 40mg Pepcid      Per Pulm:   -Discontinue patient on low-dose prednisone  -sleep study as an outpatient     Per cardiology:  -Chronic atrial fibrillation:   - Rate controlled. Will add Cardizem if rate is uncontrolled. Her CHADS2-VASc score is 4 ; continue Eliquis Continue telemetry monitoring. Keep serum potassium between 4-5 and serum magnesium > 2.   - Recommend outpatient appointment to establish care     Continue following medications  -Added dulcolax for constipation.     Current Facility-Administered Medications:     albuterol-ipratropium (DUO-NEB) 2.5 MG-0.5 MG/3 ML, 3 mL, Nebulization, Q6H PRN, Moody Newsome MD, 3 mL at 10/11/21 0433    dilTIAZem ER (CARDIZEM CD) capsule 120 mg, 120 mg, Oral, DAILY, Israel Craig MD, 120 mg at 10/11/21 0851    predniSONE (DELTASONE) tablet 10 mg, 10 mg, Oral, DAILY WITH BREAKFAST, Moody Newsome MD, 10 mg at 10/11/21 0851    amLODIPine (NORVASC) tablet 10 mg, 10 mg, Oral, DAILY, Beverly Cerna MD, 10 mg at 10/09/21 2144    apixaban (ELIQUIS) tablet 5 mg, 5 mg, Oral, BID, Beverly Cerna MD, 5 mg at 10/11/21 0851    chlorthalidone (HYGROTON) tablet 25 mg, 25 mg, Oral, DAILY, Beverly Cerna MD, 25 mg at 10/11/21 9896    gabapentin (NEURONTIN) capsule 100 mg, 100 mg, Oral, QHS, Beverly Cerna MD, 100 mg at 10/10/21 2104    glipiZIDE (GLUCOTROL) tablet 5 mg, 5 mg, Oral, DAILY, Beverly Cerna MD, 5 mg at 10/11/21 0851    magnesium oxide (MAG-OX) tablet 400 mg, 400 mg, Oral, DAILY, Beverly Cerna MD, 400 mg at 10/11/21 0851    oxybutynin (DITROPAN) tablet 5 mg, 5 mg, Oral, DAILY, Beverly Cerna MD, 5 mg at 10/11/21 0851    pravastatin (PRAVACHOL) tablet 80 mg, 80 mg, Oral, DAILY, Beverly Cerna MD, 80 mg at 10/11/21 0851    lisinopriL (PRINIVIL, ZESTRIL) tablet 40 mg, 40 mg, Oral, DAILY, Beverly Cerna MD, 40 mg at 10/11/21 0851    insulin lispro (HUMALOG) injection, , SubCUTAneous, AC&HS, Beverly Cerna MD, 3 Units at 10/10/21 1138    glucose chewable tablet 16 g, 4 Tablet, Oral, PRN, Selma Cerna MD    dextrose (D50W) injection syrg 12.5-25 g, 25-50 mL, IntraVENous, PRN, Selma Cerna MD    glucagon (GLUCAGEN) injection 1 mg, 1 mg, IntraMUSCular, PRN, Selma Cerna MD    acetaminophen (TYLENOL) tablet 650 mg, 650 mg, Oral, Q6H PRN, 650 mg at 10/07/21 0443 **OR** acetaminophen (TYLENOL) suppository 650 mg, 650 mg, Rectal, Q6H PRN, Selma Cerna MD    polyethylene glycol (MIRALAX) packet 17 g, 17 g, Oral, DAILY PRN, Beverly Cerna MD, 17 g at 10/10/21 1217    ondansetron (ZOFRAN ODT) tablet 4 mg, 4 mg, Oral, Q8H PRN **OR** ondansetron (ZOFRAN) injection 4 mg, 4 mg, IntraVENous, Q6H PRN, Beverly Cerna MD    famotidine (PEPCID) tablet 40 mg, 40 mg, Oral, BID, Beverly Cerna MD, 40 mg at 10/11/21 5653

## 2021-10-11 NOTE — PROGRESS NOTES
CM got message from liaison at [facility]. Patient is still pending authorization. They were told that a peer to peer has been requested, but have not been given the information for that to be completed. CM will continue to follow up and will update primary MD when peer to peer information is available. CM updated patient.

## 2021-10-11 NOTE — PROGRESS NOTES
Patient has been accepted by [facility] with an anticipated start date of 10/12/21. CM made patient and daughters aware. CM spoke with primary physician and patient is clear to discharge today. Discharge plan of care/case management plan validated with provider discharge order.

## 2021-10-11 NOTE — PROGRESS NOTES
PHYSICAL THERAPY TREATMENT  Patient: Patito Mann (5 y.o. female)  Date: 10/11/2021  Diagnosis: Dyspnea [R06.00]  SOB (shortness of breath) [R06.02] <principal problem not specified>       Precautions: Fall  Chart, physical therapy assessment, plan of care and goals were reviewed. ASSESSMENT  Patient continues with skilled PT services and is progressing towards goals. Pt semi supine in bed upon arrival and agreeable to session. Performed bed mobility with SBA. STS performed with close SBA and RW for balance upon standing. Patient able to increase her ambulation distance this session with RW and CGA for safety. No LOB or knee buckling noted during gait, however patient noted with SOB. Pt required 3 standing rest breaks during gait (125') for about 30-45 seconds each  and 1 seated rest break for about 3-4 minutes. Patient noted with decreased activity tolerance and SOB during gait. Pt able to recall marches from HEP, reeducated on remainder of therex and pt verbalized her understanding. Pt reporting she was independent with ADL's and mobility PTA and lives alone when she is not visiting her daughter. Patient was left seated in chair with call bell in reach and needs met. Recommending d/c to IRF once medically appropriate to increased activity tolerance and regain independence. Current Level of Function Impacting Discharge (mobility/balance): CGA for safety due to fatigue and SOB    Other factors to consider for discharge: PLOF, time since onset, family assistance, DME         PLAN :  Patient continues to benefit from skilled intervention to address the above impairments. Continue treatment per established plan of care. to address goals.     Recommendation for discharge: (in order for the patient to meet his/her long term goals)  [facility]     This discharge recommendation:  Has been made in collaboration with the attending provider and/or case management    IF patient discharges home will need the following DME: to be determined (TBD)       SUBJECTIVE:   Patient stated I just feel fatigued, that's why  I need a rest break.     OBJECTIVE DATA SUMMARY:   Critical Behavior:  Neurologic State: Alert  Orientation Level: Oriented X4  Cognition: Follows commands  Safety/Judgement: Fall prevention, Home safety    Functional Mobility Training:  Bed Mobility:  Rolling: Stand-by assistance  Supine to Sit: Stand-by assistance  Scooting: Stand-by assistance    Transfers:  Sit to Stand: Stand-by assistance  Stand to Sit: Stand-by assistance  Bed to Chair: Contact guard assistance    Balance:  Sitting: Intact  Standing: Impaired; Without support  Standing - Static: Constant support;Good  Standing - Dynamic : Constant support;Good    Ambulation/Gait Training:  Distance (ft): 125 Feet (ft)  Assistive Device: Gait belt;Walker, rolling  Ambulation - Level of Assistance: Contact guard assistance  Gait Abnormalities: Shuffling gait  Base of Support: Widened  Speed/Gely: Slow;Shuffled      Therapeutic Exercises:   Patient able to recall Marching per last session, reeducated on LAQ, AP, and hip ab/ad. Patient verbalized her understanding. Pain Ratin/10    Activity Tolerance:   Poor, requires rest breaks, and observed SOB with activity  Please refer to the flowsheet for vital signs taken during this treatment. After treatment patient left in no apparent distress:   Sitting in chair and Call bell within reach    COMMUNICATION/COLLABORATION:   The patients plan of care was discussed with: Registered nurse. Problem: Mobility Impaired (Adult and Pediatric)  Goal: *Acute Goals and Plan of Care (Insert Text)  Description: Pt will be I with [facility] in 7 days. Pt will perform bed mobility with mod I in 7 days. Pt will perform transfers with mod I in 7 days. Pt will amb  feet with LRAD safely with mod I in 7 days.    Outcome: Progressing Towards Goal       Caleb Velasquez PTA   Time Calculation: 24 mins

## 2021-10-11 NOTE — PROGRESS NOTES
Discharge plan of care/case management plan validated with provider discharge order. Discharge medications reviewed with patient and daughter and appropriate educational materials and side effects teaching were provided. IV removed. Pt transported to personal family vehicle via staff.

## 2021-10-11 NOTE — DISCHARGE SUMMARY
Discharge Summary       PATIENT ID: Cleveland Granado  MRN: [id number]   YOB: 1950    DATE OF ADMISSION: 10/5/2021  9:49 AM    DATE OF DISCHARGE:   PRIMARY CARE PROVIDER: Virginia Blanco MD     ATTENDING PHYSICIAN: Macy Cerna  DISCHARGING PROVIDER: Macy Cerna      CONSULTATIONS: IP CONSULT TO PULMONOLOGY  IP CONSULT TO CARDIOLOGY  IP CONSULT TO GASTROENTEROLOGY    PROCEDURES/SURGERIES: * No surgery found *    ADMITTING DIAGNOSES:    Patient Active Problem List    Diagnosis Date Noted    Dyspnea 10/05/2021    SOB (shortness of breath) 10/05/2021       DISCHARGE DIAGNOSES / PLAN:      Acute dyspnea  History of COVID-19  Type 2 diabetes  Hypertension  Hyperlipidemia  History of A. Fib  Pulmonary hypertension  · LV: Estimated LVEF is 60 - 65%. Normal cavity size, wall thickness and systolic function (ejection fraction normal). DISCHARGE MEDICATIONS:  Current Discharge Medication List      START taking these medications    Details   dilTIAZem ER (CARDIZEM CD) 120 mg capsule Take 1 Capsule by mouth daily. Qty: 30 Capsule, Refills: 1  Start date: 10/9/2021      famotidine (PEPCID) 40 mg tablet Take 1 Tablet by mouth two (2) times a day. Qty: 60 Tablet, Refills: 0  Start date: 10/8/2021      predniSONE (DELTASONE) 10 mg tablet Take 10 mg by mouth daily (with breakfast). Qty: 5 Tablet, Refills: 0  Start date: 10/9/2021         CONTINUE these medications which have NOT CHANGED    Details   apixaban (Eliquis) 5 mg tablet Eliquis 5 mg tablet   Take 1 tablet twice a day by oral route.       glipiZIDE SR (GLUCOTROL XL) 5 mg CR tablet glipizide ER 5 mg tablet, extended release 24 hr      amLODIPine (NORVASC) 10 mg tablet amlodipine 10 mg tablet      pravastatin (PRAVACHOL) 80 mg tablet pravastatin 80 mg tablet      ramipriL (ALTACE) 10 mg capsule       chlorthalidone (HYGROTON) 25 mg tablet chlorthalidone 25 mg tablet   TAKE 1 TABLET BY MOUTH EVERY DAY      gabapentin (NEURONTIN) 100 mg capsule gabapentin 100 mg capsule   TAKE 1 CAPSULE AT BEDTIME THEN SLOWLY INCREASE TO 1 CAPSULE 3 TIMES A DAY      magnesium oxide (MAG-OX) 400 mg tablet magnesium oxide 400 mg (241.3 mg magnesium) tablet   Take 1 tablet every day by oral route. oxybutynin (DITROPAN) 5 mg tablet 1 TABLET AT BEDTIME ORALLY ONCE A DAY 10 DAYS         STOP taking these medications       albuterol (PROVENTIL HFA, VENTOLIN HFA, PROAIR HFA) 90 mcg/actuation inhaler Comments:   Reason for Stopping:         albuterol (PROVENTIL HFA, VENTOLIN HFA, PROAIR HFA) 90 mcg/actuation inhaler Comments:   Reason for Stopping:                 NOTIFY YOUR PHYSICIAN FOR ANY OF THE FOLLOWING:   Fever over 101 degrees for 24 hours. Chest pain, shortness of breath, fever, chills, nausea, vomiting, diarrhea, change in mentation, falling, weakness, bleeding. Severe pain or pain not relieved by medications. Or, any other signs or symptoms that you may have questions about. DISPOSITION:  x  Home With:   OT  PT  HH  RN       Long term SNF/Inpatient Rehab    Independent/assisted living    Hospice    Other:       PATIENT CONDITION AT DISCHARGE: Stable      PHYSICAL EXAMINATION AT DISCHARGE:  General:          Alert, cooperative, no distress, appears stated age. HEENT:           Atraumatic, anicteric sclerae, pink conjunctivae                          No oral ulcers, mucosa moist, throat clear, dentition fair  Neck:               Supple, symmetrical  Lungs:             Clear to auscultation bilaterally. No Wheezing or Rhonchi. No rales. Chest wall:      No tenderness  No Accessory muscle use. Heart:              Regular  rhythm,  No  murmur   No edema  Abdomen:        Soft, non-tender. Not distended. Bowel sounds normal  Extremities:     No cyanosis. No clubbing,                            Skin turgor normal, Capillary refill normal  Skin:                Not pale.   Not Jaundiced  No rashes   Psych:             Not anxious or agitated. Neurologic:      Alert, moves all extremities, answers questions appropriately and responds to commands     CTA CHEST W OR W WO CONT   Final Result   1. No large central pulmonary embolus. Main pulmonary artery is larger than the   aorta suggesting pulmonary arterial hypertension. 2. Cardiomegaly. 3. Bilateral lower lung atelectasis or infiltrates. XR CHEST PORT   Final Result           Recent Results (from the past 24 hour(s))   GLUCOSE, POC    Collection Time: 10/10/21  4:20 PM   Result Value Ref Range    Glucose (POC) 134 (H) 65 - 117 mg/dL    Performed by Radha Lopez [facility], POC    Collection Time: 10/10/21  7:31 PM   Result Value Ref Range    Glucose (POC) 199 (H) 65 - 117 mg/dL    Performed by Valentina Kuhn    GLUCOSE, POC    Collection Time: 10/11/21  7:18 AM   Result Value Ref Range    Glucose (POC) 121 (H) 65 - 117 mg/dL    Performed by Yamil Mireles    GLUCOSE, POC    Collection Time: 10/11/21 11:39 AM   Result Value Ref Range    Glucose (POC) 226 (H) 65 - 117 mg/dL    Performed by [address]:    Patient is a 65 y. o. year old female past medical history of diabetes hypertension hyperlipidemia chronic A. fib came to emergency room because of shortness of breath and dyspnea according the patient patient have Covid 19 in April since then patient feeling weak tired seen in the ER 3 times but recently getting more worse came to the emergency room seen by the ER physician    History of present illness precipitation physical at the time of admission with acute shortness of breath history of COVID-19 patient have echo done shows ejection fraction was 66 5% patient seen by PT OT and recommended inpatient rehab discharge patient have difficulty in swallowing seen by the speech therapist in the GI recommend upper GI series if able symptoms persist    Otherwise patient was alert awake denies any chest pain shortness of breath nausea vomiting discharged in stable condition    Waiting for placement      Signed:   Zabrina Kat MD  10/11/2021  2:04 PM

## 2021-10-11 NOTE — PROGRESS NOTES
PULMONARY NOTE  [facility]    Name: Melody Gomez MRN: [id number]   : 1950 Hospital: [facility]   Date: 10/11/2021  Admission date: 10/5/2021 Hospital Day: 7       HPI:     Hospital Problems  Never Reviewed        Codes Class Noted POA    Dyspnea ICD-10-CM: R06.00  ICD-9-CM: 786.09  10/5/2021 Unknown        SOB (shortness of breath) ICD-10-CM: R06.02  ICD-9-CM: 786.05  10/5/2021 Unknown                   [x] High complexity decision making was performed  [x] See my orders for details      Subjective/Initial History:     I was asked by Yesica Gregorio MD to see Melody Gomez  a 66 y.o.   female in consultation     Excerpts from admission 10/5/2021 or consult notes as follows:     27-year-old lady came in because of shortness of breath and dyspnea significant past medical history of chronic A. fib, diabetes mellitus hypertension hyperlipidemia.   She was diagnosed with COVID-19 pneumonia in April since then patient has not been feeling well she has multiple admissions and ER visits secondary to the above for shortness of breath and dyspnea according to the daughter she is not been feeling well so the past 2 days she is more short of breath so he came to the hospital got admitted and pulmonary consult was called    Allergies   Allergen Reactions    Doxycycline Unknown (comments)     HIVES    Metoprolol Unknown (comments)     HIVES        MAR reviewed and pertinent medications noted or modified as needed     Current Facility-Administered Medications   Medication    albuterol-ipratropium (DUO-NEB) 2.5 MG-0.5 MG/3 ML    dilTIAZem ER (CARDIZEM CD) capsule 120 mg    predniSONE (DELTASONE) tablet 10 mg    amLODIPine (NORVASC) tablet 10 mg    apixaban (ELIQUIS) tablet 5 mg    chlorthalidone (HYGROTON) tablet 25 mg    gabapentin (NEURONTIN) capsule 100 mg    glipiZIDE (GLUCOTROL) tablet 5 mg    magnesium oxide (MAG-OX) tablet 400 mg    oxybutynin (DITROPAN) tablet 5 mg    pravastatin (PRAVACHOL) tablet 80 mg    lisinopriL (PRINIVIL, ZESTRIL) tablet 40 mg    insulin lispro (HUMALOG) injection    glucose chewable tablet 16 g    dextrose (D50W) injection syrg 12.5-25 g    glucagon (GLUCAGEN) injection 1 mg    acetaminophen (TYLENOL) tablet 650 mg    Or    acetaminophen (TYLENOL) suppository 650 mg    polyethylene glycol (MIRALAX) packet 17 g    ondansetron (ZOFRAN ODT) tablet 4 mg    Or    ondansetron (ZOFRAN) injection 4 mg    famotidine (PEPCID) tablet 40 mg      Patient PCP: Tg Otto MD  PMH:  has a past medical history of Diabetes ([address] High cholesterol, and Hypertension. PSH:   has a past surgical history that includes hx hemorrhoidectomy; hx tonsillectomy; and hx wisdom teeth extraction. FHX: family history is not on file. SHX:  reports that she has never smoked. She does not have any smokeless tobacco history on file. She reports that she does not drink alcohol and does not use drugs. ROS:    Review of Systems   Constitutional: Positive for malaise/fatigue. HENT: Negative. Eyes: Negative. Respiratory: Positive for shortness of breath. Cardiovascular: Negative. Gastrointestinal: Negative. Genitourinary: Negative. Musculoskeletal: Negative. Skin: Negative. Neurological: Negative. Psychiatric/Behavioral: Negative.          Objective:     Vital Signs: Telemetry atrial fibrillation Intake/Output:   Visit Vitals  BP (!) 121/59 (BP 1 Location: Right upper arm, BP Patient Position: At rest) Comment: primary nurse notified    Pulse 81   Temp 98.4 °F (36.9 °C)   Resp 18   Ht 5' 4.5\" (1.638 m)   Wt 107.5 kg (236 lb 15.9 oz)   SpO2 94%   BMI 40.05 kg/m²       Temp (24hrs), Av.2 °F (36.8 °C), Min:97.9 °F (36.6 °C), Max:98.4 °F (36.9 °C)        O2 Device: None (Room air) O2 Flow Rate (L/min): 1 l/min       Wt Readings from Last 4 Encounters:   10/06/21 107.5 kg (236 lb 15.9 oz)   21 108.9 kg (240 lb)          Intake/Output Summary (Last 24 hours) at 10/11/2021 0948  Last data filed at 10/10/2021 1755  Gross per 24 hour   Intake 640 ml   Output    Net 640 ml       Last shift:      No intake/output data recorded. Last 3 shifts: 10/09 1901 - 10/11 0700  In: 0 [P.O.:640]  Out: -        Physical Exam:     Physical Exam  Constitutional:       Appearance: She is obese. HENT:      Head: Normocephalic and atraumatic. Nose: Nose normal.      Mouth/Throat:      Mouth: Mucous membranes are moist.   Eyes:      Pupils: Pupils are equal, round, and reactive to light. Cardiovascular:      Rate and Rhythm: Normal rate and regular rhythm. Pulses: Normal pulses. Heart sounds: Normal heart sounds. Pulmonary:      Effort: Pulmonary effort is normal.   Abdominal:      General: Abdomen is flat. Bowel sounds are normal.      Palpations: Abdomen is soft. Musculoskeletal:         General: Normal range of motion. Cervical back: Neck supple. Skin:     General: Skin is warm. Neurological:      General: No focal deficit present. Mental Status: She is alert. Psychiatric:         Mood and Affect: Mood normal.          Labs:    No results for input(s): WBC, HGB, PLT, INR, APTT, HGBEXT, PLTEXT, INREXT, HGBEXT, PLTEXT, INREXT in the last 72 hours. No lab exists for component: FIB, DDMER  No results for input(s): NA, K, CL, CO2, GLU, BUN, CREA, CA, MG, PHOS, LAC, ALB, TBIL, ALT, AML, LPSE in the last 72 hours. No lab exists for component: SGOT  No results for input(s): PH, PCO2, PO2, HCO3, FIO2 in the last 72 hours. No results for input(s): CPK, CKNDX, TROIQ in the last 72 hours.     No lab exists for component: CPKMB  No results found for: BNPP, BNP   Lab Results   Component Value Date/Time    Culture result: No growth 4 days 10/05/2021 11:41 PM    Culture result: No growth 4 days 10/05/2021 11:38 PM   No results found for: TSH, TSHEXT, TSHEXT    Imaging:    CXR Results  (Last 48 hours)    None        Results from Hospital Encounter encounter on 10/05/21    XR CHEST PORT    Narrative  Chest single view. Comparison single view chest May 8, 2021. Hilar prominence, this may be exaggerated related to reduced lung volumes. Unable to exclude lymphadenopathy. No gross interstitial or alveolar pulmonary edema. No pneumothorax or sizable  pleural effusion. Consider optimal inspiratory PA and lateral view of the chest or  CECT chest.      Results from Hospital Encounter encounter on 05/08/21    XR CHEST SNGL V    Narrative  Chest single view. A single view of the chest is obtained. Lung volumes are within normal limits. The peripheral lungs are clear. Cardiac and mediastinal structures are within  normal limits. There is no pneumothorax or pleural effusion. Results from [place] encounter on 10/05/21    CTA CHEST W OR W WO CONT    Narrative  Shortness of breath. Comparison chest x-ray 10/5/2021. Chest CTA Technique: Axial chest with IV contrast. Multiplanar chest  reformatting and chest MIPs. Dose reduction: All CT scans at this facility are performed using dose reduction  optimization techniques as appropriate to a performed exam including the  following: Automated exposure control, adjustments of the mA and/or kV according  to patient's size, or use of iterative reconstruction technique. FINDINGS: Mild cardiomegaly. No pericardial effusion. Thoracic aorta without  aneurysm or dissection. No large central pulmonary arterial filling defects. Main pulmonary artery is larger than the aorta. No mediastinal or hilar  adenopathy. Thoracic esophagus is collapsed. No pleural effusion. Bilateral  lower lung airspace disease. Included imaging of the upper abdomen demonstrates  dense material in the colon causing streak artifact. No axillary adenopathy. Included thyroid unremarkable. Degenerative changes about the bony structures. Impression  1. No large central pulmonary embolus.  Main pulmonary artery is larger than the  aorta suggesting pulmonary arterial hypertension. 2. Cardiomegaly. 3. Bilateral lower lung atelectasis or infiltrates. IMPRESSION:   1. Dyspnea  2. Acute hypoxic respiratory failure  3. Personal history of COVID-19 pneumonia  4. Rule out obstructive sleep apnea  5. Chronic A. Fib on Eliquis  6. Hypertension hyperlipidemia  7. Pulmonary hypertension  8. Prognosis guarded       RECOMMENDATIONS/PLAN:     1. Her dyspnea is multifactorial secondary to underlying chronic A. fib pulmonary hypertension also possible sleep apnea and also she had history of COVID-19 pneumonia  2. CAT scan of the chest negative for pulmonary embolism atelectasis basal no lung mass  3. 2D echo shows normal ejection fraction  4. Patient on low-dose prednisone  5. She needs sleep study as an outpatient  6.  DVT, SUP prophylaxis           Bernardino Dey MD

## 2021-10-11 NOTE — PROGRESS NOTES
SPEECH LANGUAGE PATHOLOGY DYSPHAGIA TREATMENT  Patient: Anastasia Goldstein (31 y.o. female)  Date: 10/11/2021  Diagnosis: Dyspnea [R06.00]  SOB (shortness of breath) [R06.02] <principal problem not specified>       Precautions: aspiration Fall    ASSESSMENT:    Patient currently tolerating soft, easy to chew diet and thin liquids. She reports continued effort to swallow even thin liquids but feels her swallow has improved w/ texture modifications. Administered thin liquids via cup and consecutive swallows. Swallow initiation timely. HLE and protraction adequate to palpation. Pharyngeal response w/ audible tightness to cervical ausculation. This correlates w/ possible UES dysfunction and/or delayed pharyngeal clearance s/t dysfunction to the proximal esophagus. Educated patient to diet choices of soft slick textures to improve bolus transit, extra gravy and sauces. Use of liquid wash, effortful and multiple swallows. Smaller meals through out the day vs larger meals. Encouraged patient to follow-up w/ GI on an outpatient basis for further esophageal intervention. PLAN:  Recommendations and Planned Interventions:  Continue w/ soft, EC and thin liquids. Compensatory swallow strategies as stated above. Follow-up w/ GI. Patient continues to benefit from skilled intervention to address the above impairments. Continue treatment per established plan of care. Frequency/Duration: Patient will be followed by speech-language pathology 3 times a week to address goals. Discharge Recommendations:  None     SUBJECTIVE:   Patient seen sitting in chair. \" Im going to be discharged this afternoon. \"     OBJECTIVE:     CXR Results  (Last 48 hours)      None          CT Results  (Last 48 hours)      None           Cognitive and Communication Status:  Neurologic State: Alert  Orientation Level: Oriented X4  Cognition: Follows commands        Safety/Judgement: Fall prevention, Home safety         Pain:  Pain Scale 1: Numeric (0 - 10)  Pain Intensity 1: 0       After treatment:   Patient left in no apparent distress sitting up in chair and Call bell within reach    COMMUNICATION/EDUCATION:   Patient was educated regarding her deficit(s) of dysphagia, swallow safety precautions, diet recs and POC. She demonstrated Good understanding as evidenced by verbal understanding. Sonia Veronica, SLP M.S. CCC-SLP  Time Calculation: 13 mins           Problem: Dysphagia (Adult)  Goal: *Acute Goals and Plan of Care (Insert Text)  Description: Speech Therapy Swallow Goals  Initiated 10/7/2021    -Patient will tolerate PO trials without clinical indicators of aspiration given no cues within 7 day(s). -Patient will demonstrate understanding of swallow safety precautions and aspiration precautions, diet recs with no cues within 7 day(s).          Outcome: Progressing Towards Goal

## 2021-10-11 NOTE — PROGRESS NOTES
CM received word that patient was denied for IRF because she is too high functioning. CM spoke with patient and both daughters regarding home health services. They are agreeable to this at the time and patient will be going to stay with daughter at [facility]. Patient and daughters did not have a preference in company. Choice letter signed and placed on chart. Referrals sent.  CM will continue to follow up and has updated primary MD.

## 2021-10-12 LAB
BACTERIA SPEC CULT: NORMAL
BACTERIA SPEC CULT: NORMAL
SPECIAL REQUESTS,SREQ: NORMAL
SPECIAL REQUESTS,SREQ: NORMAL

## 2022-02-19 ENCOUNTER — HOSPITAL ENCOUNTER (EMERGENCY)
Age: 72
Discharge: HOME | End: 2022-02-19
Attending: STUDENT IN AN ORGANIZED HEALTH CARE EDUCATION/TRAINING PROGRAM
Payer: MEDICARE

## 2022-02-19 ENCOUNTER — APPOINTMENT (OUTPATIENT)
Dept: GENERAL RADIOLOGY | Age: 72
End: 2022-02-19
Attending: STUDENT IN AN ORGANIZED HEALTH CARE EDUCATION/TRAINING PROGRAM
Payer: MEDICARE

## 2022-02-19 VITALS
HEIGHT: 65 IN | RESPIRATION RATE: 20 BRPM | HEART RATE: 64 BPM | BODY MASS INDEX: 39.32 KG/M2 | OXYGEN SATURATION: 98 % | TEMPERATURE: 98.3 F | SYSTOLIC BLOOD PRESSURE: 130 MMHG | WEIGHT: 236 LBS | DIASTOLIC BLOOD PRESSURE: 65 MMHG

## 2022-02-19 DIAGNOSIS — J20.9 ACUTE BRONCHITIS, UNSPECIFIED ORGANISM: Primary | ICD-10-CM

## 2022-02-19 DIAGNOSIS — R06.1 STRIDOR: ICD-10-CM

## 2022-02-19 LAB
ALBUMIN SERPL-MCNC: 3.4 G/DL (ref 3.5–5)
ALBUMIN/GLOB SERPL: 0.9 {RATIO} (ref 1.1–2.2)
ALP SERPL-CCNC: 131 U/L (ref 45–117)
ALT SERPL-CCNC: 27 U/L (ref 12–78)
ANION GAP SERPL CALC-SCNC: 2 MMOL/L (ref 5–15)
AST SERPL W P-5'-P-CCNC: 19 U/L (ref 15–37)
BASOPHILS # BLD: 0 K/UL (ref 0–0.1)
BASOPHILS NFR BLD: 1 % (ref 0–1)
BILIRUB SERPL-MCNC: 0.4 MG/DL (ref 0.2–1)
BNP SERPL-MCNC: 62 PG/ML
BUN SERPL-MCNC: 21 MG/DL (ref 6–20)
BUN/CREAT SERPL: 32 (ref 12–20)
CA-I BLD-MCNC: 10.4 MG/DL (ref 8.5–10.1)
CHLORIDE SERPL-SCNC: 106 MMOL/L (ref 97–108)
CO2 SERPL-SCNC: 32 MMOL/L (ref 21–32)
CREAT SERPL-MCNC: 0.65 MG/DL (ref 0.55–1.02)
DIFFERENTIAL METHOD BLD: NORMAL
EOSINOPHIL # BLD: 0.1 K/UL (ref 0–0.4)
EOSINOPHIL NFR BLD: 1 % (ref 0–7)
ERYTHROCYTE [DISTWIDTH] IN BLOOD BY AUTOMATED COUNT: 12.6 % (ref 11.5–14.5)
GLOBULIN SER CALC-MCNC: 3.9 G/DL (ref 2–4)
GLUCOSE SERPL-MCNC: 136 MG/DL (ref 65–100)
HCT VFR BLD AUTO: 39.5 % (ref 35–47)
HGB BLD-MCNC: 12.2 G/DL (ref 11.5–16)
IMM GRANULOCYTES # BLD AUTO: 0 K/UL (ref 0–0.04)
IMM GRANULOCYTES NFR BLD AUTO: 0 % (ref 0–0.5)
LYMPHOCYTES # BLD: 1.9 K/UL (ref 0.8–3.5)
LYMPHOCYTES NFR BLD: 45 % (ref 12–49)
MCH RBC QN AUTO: 27.5 PG (ref 26–34)
MCHC RBC AUTO-ENTMCNC: 30.9 G/DL (ref 30–36.5)
MCV RBC AUTO: 89 FL (ref 80–99)
MONOCYTES # BLD: 0.4 K/UL (ref 0–1)
MONOCYTES NFR BLD: 10 % (ref 5–13)
NEUTS SEG # BLD: 1.8 K/UL (ref 1.8–8)
NEUTS SEG NFR BLD: 43 % (ref 32–75)
NRBC # BLD: 0 K/UL (ref 0–0.01)
NRBC BLD-RTO: 0 PER 100 WBC
PLATELET # BLD AUTO: 187 K/UL (ref 150–400)
PMV BLD AUTO: 11.2 FL (ref 8.9–12.9)
POTASSIUM SERPL-SCNC: 3.9 MMOL/L (ref 3.5–5.1)
PROT SERPL-MCNC: 7.3 G/DL (ref 6.4–8.2)
RBC # BLD AUTO: 4.44 M/UL (ref 3.8–5.2)
SODIUM SERPL-SCNC: 140 MMOL/L (ref 136–145)
WBC # BLD AUTO: 4.2 K/UL (ref 3.6–11)

## 2022-02-19 PROCEDURE — 71045 X-RAY EXAM CHEST 1 VIEW: CPT

## 2022-02-19 PROCEDURE — 74011000250 HC RX REV CODE- 250: Performed by: STUDENT IN AN ORGANIZED HEALTH CARE EDUCATION/TRAINING PROGRAM

## 2022-02-19 PROCEDURE — 36415 COLL VENOUS BLD VENIPUNCTURE: CPT

## 2022-02-19 PROCEDURE — 96374 THER/PROPH/DIAG INJ IV PUSH: CPT

## 2022-02-19 PROCEDURE — 94640 AIRWAY INHALATION TREATMENT: CPT

## 2022-02-19 PROCEDURE — 80053 COMPREHEN METABOLIC PANEL: CPT

## 2022-02-19 PROCEDURE — 70360 X-RAY EXAM OF NECK: CPT

## 2022-02-19 PROCEDURE — 85025 COMPLETE CBC W/AUTO DIFF WBC: CPT

## 2022-02-19 PROCEDURE — 93005 ELECTROCARDIOGRAM TRACING: CPT

## 2022-02-19 PROCEDURE — 74011000250 HC RX REV CODE- 250

## 2022-02-19 PROCEDURE — 74011250636 HC RX REV CODE- 250/636: Performed by: STUDENT IN AN ORGANIZED HEALTH CARE EDUCATION/TRAINING PROGRAM

## 2022-02-19 PROCEDURE — 99284 EMERGENCY DEPT VISIT MOD MDM: CPT

## 2022-02-19 PROCEDURE — 83880 ASSAY OF NATRIURETIC PEPTIDE: CPT

## 2022-02-19 RX ORDER — METHYLPREDNISOLONE 4 MG/1
TABLET ORAL
Qty: 1 DOSE PACK | Refills: 0 | Status: SHIPPED | OUTPATIENT
Start: 2022-02-19

## 2022-02-19 RX ORDER — LIDOCAINE HYDROCHLORIDE 40 MG/ML
SOLUTION TOPICAL
Status: COMPLETED | OUTPATIENT
Start: 2022-02-19 | End: 2022-02-19

## 2022-02-19 RX ORDER — METHYLPREDNISOLONE 4 MG/1
TABLET ORAL
Qty: 1 DOSE PACK | Refills: 0 | Status: SHIPPED | OUTPATIENT
Start: 2022-02-19 | End: 2022-02-19 | Stop reason: SDUPTHER

## 2022-02-19 RX ORDER — AMOXICILLIN AND CLAVULANATE POTASSIUM 875; 125 MG/1; MG/1
1 TABLET, FILM COATED ORAL 2 TIMES DAILY
Qty: 20 TABLET | Refills: 0 | Status: SHIPPED | OUTPATIENT
Start: 2022-02-19

## 2022-02-19 RX ORDER — AMOXICILLIN AND CLAVULANATE POTASSIUM 875; 125 MG/1; MG/1
1 TABLET, FILM COATED ORAL 2 TIMES DAILY
Qty: 20 TABLET | Refills: 0 | Status: SHIPPED | OUTPATIENT
Start: 2022-02-19 | End: 2022-02-19 | Stop reason: SDUPTHER

## 2022-02-19 RX ADMIN — METHYLPREDNISOLONE SODIUM SUCCINATE 125 MG: 125 INJECTION, POWDER, FOR SOLUTION INTRAMUSCULAR; INTRAVENOUS at 08:43

## 2022-02-19 RX ADMIN — RACEPINEPHRINE HYDROCHLORIDE: 11.25 SOLUTION RESPIRATORY (INHALATION) at 08:28

## 2022-02-19 RX ADMIN — LIDOCAINE HYDROCHLORIDE: 40 SOLUTION TOPICAL at 09:00

## 2022-02-19 NOTE — DISCHARGE INSTRUCTIONS
Thank you! Thank you for allowing me to care for you in the emergency department. I sincerely hope that you are satisfied with your visit today. It is my goal to provide you with excellent care. Below you will find a list of your labs and imaging from your visit today. Should you have any questions regarding these results please do not hesitate to call the emergency department. Labs -     Recent Results (from the past 12 hour(s))   CBC WITH AUTOMATED DIFF    Collection Time: 02/19/22  8:45 AM   Result Value Ref Range    WBC 4.2 3.6 - 11.0 K/uL    RBC 4.44 3.80 - 5.20 M/uL    HGB 12.2 11.5 - 16.0 g/dL    HCT 39.5 35.0 - 47.0 %    MCV 89.0 80.0 - 99.0 FL    MCH 27.5 26.0 - 34.0 PG    MCHC 30.9 30.0 - 36.5 g/dL    RDW 12.6 11.5 - 14.5 %    PLATELET 167 500 - 878 K/uL    MPV 11.2 8.9 - 12.9 FL    NRBC 0.0 0.0  WBC    ABSOLUTE NRBC 0.00 0.00 - 0.01 K/uL    NEUTROPHILS 43 32 - 75 %    LYMPHOCYTES 45 12 - 49 %    MONOCYTES 10 5 - 13 %    EOSINOPHILS 1 0 - 7 %    BASOPHILS 1 0 - 1 %    IMMATURE GRANULOCYTES 0 0 - 0.5 %    ABS. NEUTROPHILS 1.8 1.8 - 8.0 K/UL    ABS. LYMPHOCYTES 1.9 0.8 - 3.5 K/UL    ABS. MONOCYTES 0.4 0.0 - 1.0 K/UL    ABS. EOSINOPHILS 0.1 0.0 - 0.4 K/UL    ABS. BASOPHILS 0.0 0.0 - 0.1 K/UL    ABS. IMM. GRANS. 0.0 0.00 - 0.04 K/UL    DF AUTOMATED     METABOLIC PANEL, COMPREHENSIVE    Collection Time: 02/19/22  8:45 AM   Result Value Ref Range    Sodium 140 136 - 145 mmol/L    Potassium 3.9 3.5 - 5.1 mmol/L    Chloride 106 97 - 108 mmol/L    CO2 32 21 - 32 mmol/L    Anion gap 2 (L) 5 - 15 mmol/L    Glucose 136 (H) 65 - 100 mg/dL    BUN 21 (H) 6 - 20 mg/dL    Creatinine 0.65 0.55 - 1.02 mg/dL    BUN/Creatinine ratio 32 (H) 12 - 20      GFR est AA >60 >60 ml/min/1.73m2    GFR est non-AA >60 >60 ml/min/1.73m2    Calcium 10.4 (H) 8.5 - 10.1 mg/dL    Bilirubin, total 0.4 0.2 - 1.0 mg/dL    AST (SGOT) 19 15 - 37 U/L    ALT (SGPT) 27 12 - 78 U/L    Alk.  phosphatase 131 (H) 45 - 117 U/L    Protein, total 7.3 6.4 - 8.2 g/dL    Albumin 3.4 (L) 3.5 - 5.0 g/dL    Globulin 3.9 2.0 - 4.0 g/dL    A-G Ratio 0.9 (L) 1.1 - 2.2     NT-PRO BNP    Collection Time: 02/19/22  8:45 AM   Result Value Ref Range    NT pro-BNP 62 <125 pg/mL       Radiologic Studies -   XR CHEST PORT   Final Result   No acute findings. XR NECK SOFT TISSUE   Final Result   1. Steepling of the tracheal airway suggests laryngotracheal bronchitis. Correlate clinically. CT Results  (Last 48 hours)      None          CXR Results  (Last 48 hours)                 02/19/22 0955  XR CHEST PORT Final result    Impression:  No acute findings. Narrative:  Shortness of breath. Comparison chest x-ray 10/5/2021. Findings: Single frontal view of the chest. Normal cardiomediastinal silhouette. No vascular congestion or pulmonary edema. The lungs are similarly inflated. No   infiltrate, effusion, pneumothorax. No free air under the hemidiaphragms. Bones   grossly intact. If you feel that you have not received excellent quality care or timely care, please ask to speak to the nurse manager. Please choose us in the future for your continued health care needs. ------------------------------------------------------------------------------------------------------------  The exam and treatment you received in the Emergency Department were for an urgent problem and are not intended as complete care. It is important that you follow-up with a doctor, nurse practitioner, or physician assistant to:  (1) confirm your diagnosis,  (2) re-evaluation of changes in your illness and treatment, and  (3) for ongoing care. If your symptoms become worse or you do not improve as expected and you are unable to reach your usual health care provider, you should return to the Emergency Department. We are available 24 hours a day. Please take your discharge instructions with you when you go to your follow-up appointment. If you have any problem arranging a follow-up appointment, contact the Emergency Department immediately. If a prescription has been provided, please have it filled as soon as possible to prevent a delay in treatment. Read the entire medication instruction sheet provided to you by the pharmacy. If you have any questions or reservations about taking the medication due to side effects or interactions with other medications, please call your primary care physician or contact the ER to speak with the charge nurse. Make an appointment with your family doctor or the physician you were referred to for follow-up of this visit as instructed on your discharge paperwork, as this is a mandatory follow-up. Return to the ER if you are unable to be seen or if you are unable to be seen in a timely manner. If you have any problem arranging the follow-up visit, contact the Emergency Department immediately.

## 2022-02-19 NOTE — ED TRIAGE NOTES
Pt arrives in respiratory distress. Stridor noted. Pt unable to complete sentences.  Daughter states this has been happening for a few months with multiple work ups to attempt to dx

## 2022-02-19 NOTE — ED PROVIDER NOTES
EMERGENCY DEPARTMENT HISTORY AND PHYSICAL EXAM      Date: 2/19/2022  Patient Name: Shelia Cannon      History of Presenting Illness     Chief Complaint   Patient presents with    Respiratory Distress       History Provided By: Patient    HPI: Shelia Cannon, 66 y.o. female with MH of DM, HTN, and HLD who presents to the ED with shortness of breath. Patient reports that she is having trouble breathing due to stridor. Started 1 hour PTA. No known trigger. She has not had taking any new medications. Symptoms of moderate severity, no relieving alleviating factors with no/additional symptoms. She has had multiple similar episodes in the past in which she had extensive work-up including admissions to the hospital MRIs and endoscopies and work-up was negative. She did also have a barium swallow which was negative. This feels similar to those incidences. There are no other complaints, changes, or physical findings at this time. PCP: Sindhu Ferguson MD    Current Outpatient Medications   Medication Sig Dispense Refill    amoxicillin-clavulanate (Augmentin) 875-125 mg per tablet Take 1 Tablet by mouth two (2) times a day. 20 Tablet 0    methylPREDNISolone (Medrol, Jeramie,) 4 mg tablet Day 1: 24 mg on day 1 administered as 8 mg (2 tablets) before breakfast, 4 mg (1 tablet) after lunch, 4 mg (1 tablet) after supper, and 8 mg (2 tablets) at bedtime or 24 mg (6 tablets) as a single dose or divided into 2 or 3 doses upon initiation (regardless of time of day). Day 2: 20 mg on day 2 administered as 4 mg (1 tablet) before breakfast, 4 mg (1 tablet) after lunch, 4 mg (1 tablet) after supper, and 8 mg (2 tablets) at bedtime. Day 3: 16 mg on day 3 administered as 4 mg (1 tablet) before breakfast, 4 mg (1 tablet) after lunch, 4 mg (1 tablet) after supper, and 4 mg (1 tablet) at bedtime.  Day 4: 12 mg on day 4 administered as 4 mg (1 tablet) before breakfast, 4 mg (1 tablet) after lunch, and 4 mg (1 tablet) at bedtime. Day 5: 8 mg on day 5 administered as 4 mg (1 tablet) before breakfast and 4 mg (1 tablet) at bedtime. Day 6: 4 mg on day 6 administered as 4 mg (1 tablet) before breakfast. 1 Dose Pack 0    dilTIAZem ER (CARDIZEM CD) 120 mg capsule Take 1 Capsule by mouth daily. 30 Capsule 1    famotidine (PEPCID) 40 mg tablet Take 1 Tablet by mouth two (2) times a day. 60 Tablet 0    apixaban (Eliquis) 5 mg tablet Eliquis 5 mg tablet   Take 1 tablet twice a day by oral route.  glipiZIDE SR (GLUCOTROL XL) 5 mg CR tablet glipizide ER 5 mg tablet, extended release 24 hr      amLODIPine (NORVASC) 10 mg tablet amlodipine 10 mg tablet      pravastatin (PRAVACHOL) 80 mg tablet pravastatin 80 mg tablet      ramipriL (ALTACE) 10 mg capsule       chlorthalidone (HYGROTON) 25 mg tablet chlorthalidone 25 mg tablet   TAKE 1 TABLET BY MOUTH EVERY DAY      gabapentin (NEURONTIN) 100 mg capsule gabapentin 100 mg capsule   TAKE 1 CAPSULE AT BEDTIME THEN SLOWLY INCREASE TO 1 CAPSULE 3 TIMES A DAY      magnesium oxide (MAG-OX) 400 mg tablet magnesium oxide 400 mg (241.3 mg magnesium) tablet   Take 1 tablet every day by oral route.  oxybutynin (DITROPAN) 5 mg tablet 1 TABLET AT BEDTIME ORALLY ONCE A DAY 10 DAYS         Past History     Past Medical History:  Past Medical History:   Diagnosis Date    Diabetes ([facility] [address]     High cholesterol     Hypertension        Past Surgical History:  Past Surgical History:   Procedure Laterality Date    HX HEMORRHOIDECTOMY      HX TONSILLECTOMY      HX WISDOM TEETH EXTRACTION         Family History:  History reviewed. No pertinent family history. Social History:  Social History     Tobacco Use    Smoking status: Never Smoker    Smokeless tobacco: Never Used   Substance Use Topics    Alcohol use: Never    Drug use: Never       Allergies:   Allergies   Allergen Reactions    Doxycycline Unknown (comments)     HIVES    Metoprolol Unknown (comments)     HIVES         Review of Systems     Review of Systems    A 10 point review system was performed was negative except as noted per HPI    Physical Exam     Physical Exam  Vitals and nursing note reviewed. Constitutional:       General: She is in acute distress. Appearance: Normal appearance. She is not ill-appearing or diaphoretic. HENT:      Head: Normocephalic and atraumatic. Mouth/Throat:      Lips: Pink. Mouth: Mucous membranes are moist. No angioedema. Tongue: No lesions. Tongue does not deviate from midline. Palate: No mass and lesions. Pharynx: Oropharynx is clear. Uvula midline. No pharyngeal swelling, oropharyngeal exudate, posterior oropharyngeal erythema or uvula swelling. Eyes:      Extraocular Movements: Extraocular movements intact. Conjunctiva/sclera: Conjunctivae normal.   Neck:      Trachea: Trachea normal. No abnormal tracheal secretions. Comments: Stridorous  Cardiovascular:      Rate and Rhythm: Normal rate and regular rhythm. Pulmonary:      Effort: Pulmonary effort is normal.      Breath sounds: Normal breath sounds. Abdominal:      Palpations: Abdomen is soft. Tenderness: There is no abdominal tenderness. Neurological:      Mental Status: She is alert and oriented to person, place, and time.          Lab and Diagnostic Study Results     Labs -     Recent Results (from the past 12 hour(s))   CBC WITH AUTOMATED DIFF    Collection Time: 02/19/22  8:45 AM   Result Value Ref Range    WBC 4.2 3.6 - 11.0 K/uL    RBC 4.44 3.80 - 5.20 M/uL    HGB 12.2 11.5 - 16.0 g/dL    HCT 39.5 35.0 - 47.0 %    MCV 89.0 80.0 - 99.0 FL    MCH 27.5 26.0 - 34.0 PG    MCHC 30.9 30.0 - 36.5 g/dL    RDW 12.6 11.5 - 14.5 %    PLATELET 205 479 - 924 K/uL    MPV 11.2 8.9 - 12.9 FL    NRBC 0.0 0.0  WBC    ABSOLUTE NRBC 0.00 0.00 - 0.01 K/uL    NEUTROPHILS 43 32 - 75 %    LYMPHOCYTES 45 12 - 49 %    MONOCYTES 10 5 - 13 %    EOSINOPHILS 1 0 - 7 %    BASOPHILS 1 0 - 1 %    IMMATURE GRANULOCYTES 0 0 - 0.5 %    ABS. NEUTROPHILS 1.8 1.8 - 8.0 K/UL    ABS. LYMPHOCYTES 1.9 0.8 - 3.5 K/UL    ABS. MONOCYTES 0.4 0.0 - 1.0 K/UL    ABS. EOSINOPHILS 0.1 0.0 - 0.4 K/UL    ABS. BASOPHILS 0.0 0.0 - 0.1 K/UL    ABS. IMM. GRANS. 0.0 0.00 - 0.04 K/UL    DF AUTOMATED     METABOLIC PANEL, COMPREHENSIVE    Collection Time: 02/19/22  8:45 AM   Result Value Ref Range    Sodium 140 136 - 145 mmol/L    Potassium 3.9 3.5 - 5.1 mmol/L    Chloride 106 97 - 108 mmol/L    CO2 32 21 - 32 mmol/L    Anion gap 2 (L) 5 - 15 mmol/L    Glucose 136 (H) 65 - 100 mg/dL    BUN 21 (H) 6 - 20 mg/dL    Creatinine 0.65 0.55 - 1.02 mg/dL    BUN/Creatinine ratio 32 (H) 12 - 20      GFR est AA >60 >60 ml/min/1.73m2    GFR est non-AA >60 >60 ml/min/1.73m2    Calcium 10.4 (H) 8.5 - 10.1 mg/dL    Bilirubin, total 0.4 0.2 - 1.0 mg/dL    AST (SGOT) 19 15 - 37 U/L    ALT (SGPT) 27 12 - 78 U/L    Alk. phosphatase 131 (H) 45 - 117 U/L    Protein, total 7.3 6.4 - 8.2 g/dL    Albumin 3.4 (L) 3.5 - 5.0 g/dL    Globulin 3.9 2.0 - 4.0 g/dL    A-G Ratio 0.9 (L) 1.1 - 2.2     NT-PRO BNP    Collection Time: 02/19/22  8:45 AM   Result Value Ref Range    NT pro-BNP 62 <125 pg/mL       Radiologic Studies -   [unfilled]  CT Results  (Last 48 hours)    None        CXR Results  (Last 48 hours)               02/19/22 0955  XR CHEST PORT Final result    Impression:  No acute findings. Narrative:  Shortness of breath. Comparison chest x-ray 10/5/2021. Findings: Single frontal view of the chest. Normal cardiomediastinal silhouette. No vascular congestion or pulmonary edema. The lungs are similarly inflated. No   infiltrate, effusion, pneumothorax. No free air under the hemidiaphragms. Bones   grossly intact. Medical Decision Making and ED Course   - I am the first and primary provider for this patient AND AM THE PRIMARY PROVIDER OF RECORD.     - I reviewed the vital signs, available nursing notes, past medical history, past surgical history, family history and social history. - Initial assessment performed. The patients presenting problems have been discussed, and the staff are in agreement with the care plan formulated and outlined with them. I have encouraged them to ask questions as they arise throughout their visit. Vital Signs-Reviewed the patient's vital signs. Patient Vitals for the past 24 hrs:   Temp Pulse Resp BP SpO2   02/19/22 1212 -- 64 20 130/65 98 %   02/19/22 1131 -- 68 21 (!) 129/54 97 %   02/19/22 0838 98.3 °F (36.8 °C) 71 22 (!) 146/81 100 %       Records Reviewed: Nursing Notes and Old Medical Records    Provider Notes (Medical Decision Making):     Patient is presenting to the ED with acute onset of stridor. This is similar to multiple episodes that she had in the past.  She had an extensive work-up at outpatient as unremarkable. There are no new medications or vitamins exposures. This happened while the patient was driving her car PTA. She does have clear lung sounds bilaterally, with transmitted upper airway sounds. No oropharyngeal swelling that may point toward angioedema. Will give racemic epi, prednisone, and will do nebulized lidocaine will attempt to visualize her upper airway using fiberoptic. ED Course:       ED Course as of 02/19/22 1551   Sat Feb 19, 2022   6205 He was in at 8:27 AM interpreted by me as NSR at rate of 86, intervals within normal, left axis deviation, evidence of LVH, no ST elevation or depression, left anterior fascicular block noted. No signs of dysrhythmia. EKG unchanged from October 5, 2021. [AA]   0930 After topical anesthesia, fiberoptic was performed. There is no airway swelling. Patient currently is reporting she is back to her baseline at 100% without any stridor or trouble breathing. [AA]   0911 [id number] Patient was discussed with the ENT on-call Dr. Evita Chaves. Work-up was discussed.   Recommended discharge with Medrol pack and antibiotics and close follow-up with ENT.  Patient does have an ENT doctor that she will be able to follow-up with early next week. [AA]      ED Course User Index  [AA] Fredis Mckenna MD         Procedures and Critical Care           CRITICAL CARE NOTE :  8:44 AM  Amount of Critical Care Time: 42(minutes)    IMPENDING DETERIORATION -Airway  ASSOCIATED RISK FACTORS - Hypoxia  MANAGEMENT- Bedside Assessment and Supervision of Care  INTERPRETATION -  Xrays and Fibroptic   INTERVENTIONS -pharmaceutical interventions  CASE REVIEW - Nursing, Family and ENT  TREATMENT RESPONSE -Resolved  PERFORMED BY - Self    NOTES   :  I have spent critical care time involved in lab review, consultations with specialist, family decision- making, bedside attention and documentation. This time excludes time spent in any separate billed procedures. During this entire length of time I was immediately available to the patient . Xochilt Donaldson MD        Disposition     Disposition: Condition resolved  DC- Adult Discharges: All of the diagnostic tests were reviewed and questions answered. Diagnosis, care plan and treatment options were discussed. The patient understands the instructions and will follow up as directed. The patients results have been reviewed with them. They have been counseled regarding their diagnosis. The patient verbally convey understanding and agreement of the signs, symptoms, diagnosis, treatment and prognosis and additionally agrees to follow up as recommended with their PCP in 24 - 48 hours. They also agree with the care-plan and convey that all of their questions have been answered. I have also put together some discharge instructions for them that include: 1) educational information regarding their diagnosis, 2) how to care for their diagnosis at home, as well a 3) list of reasons why they would want to return to the ED prior to their follow-up appointment, should their condition change. Discharged      DISCHARGE PLAN:  1.    Current Discharge Medication List      CONTINUE these medications which have NOT CHANGED    Details   dilTIAZem ER (CARDIZEM CD) 120 mg capsule Take 1 Capsule by mouth daily. Qty: 30 Capsule, Refills: 1      famotidine (PEPCID) 40 mg tablet Take 1 Tablet by mouth two (2) times a day. Qty: 60 Tablet, Refills: 0      predniSONE (DELTASONE) 10 mg tablet Take 10 mg by mouth daily (with breakfast). Qty: 5 Tablet, Refills: 0      apixaban (Eliquis) 5 mg tablet Eliquis 5 mg tablet   Take 1 tablet twice a day by oral route. glipiZIDE SR (GLUCOTROL XL) 5 mg CR tablet glipizide ER 5 mg tablet, extended release 24 hr      amLODIPine (NORVASC) 10 mg tablet amlodipine 10 mg tablet      pravastatin (PRAVACHOL) 80 mg tablet pravastatin 80 mg tablet      ramipriL (ALTACE) 10 mg capsule       chlorthalidone (HYGROTON) 25 mg tablet chlorthalidone 25 mg tablet   TAKE 1 TABLET BY MOUTH EVERY DAY      gabapentin (NEURONTIN) 100 mg capsule gabapentin 100 mg capsule   TAKE 1 CAPSULE AT BEDTIME THEN SLOWLY INCREASE TO 1 CAPSULE 3 TIMES A DAY      magnesium oxide (MAG-OX) 400 mg tablet magnesium oxide 400 mg (241.3 mg magnesium) tablet   Take 1 tablet every day by oral route. oxybutynin (DITROPAN) 5 mg tablet 1 TABLET AT BEDTIME ORALLY ONCE A DAY 10 DAYS           2. Follow-up Information     Follow up With Specialties Details Why [facility] EMERGENCY DEPT Emergency Medicine Go to  As needed, If symptoms worsen [address] [facility], [address]  [phone]      ENT Doctor  Schedule an appointment as soon as possible for a visit in 3 days For reevaluation, Discuss your visit to the ER         3. Return to ED if worse   4.    Discharge Medication List as of 2/19/2022 12:57 PM      CONTINUE these medications which have CHANGED    Details   amoxicillin-clavulanate (Augmentin) 875-125 mg per tablet Take 1 Tablet by mouth two (2) times a day., Normal, Disp-20 Tablet, R-0      methylPREDNISolone (Medrol, Jeramie,) 4 mg tablet Day 1: 24 mg on day 1 administered as 8 mg (2 tablets) before breakfast, 4 mg (1 tablet) after lunch, 4 mg (1 tablet) after supper, and 8 mg (2 tablets) at bedtime or 24 mg (6 tablets) as a single dose or divided into 2 or 3 doses upon initiation (regard less of time of day). Day 2: 20 mg on day 2 administered as 4 mg (1 tablet) before breakfast, 4 mg (1 tablet) after lunch, 4 mg (1 tablet) after supper, and 8 mg (2 tablets) at bedtime. Day 3: 16 mg on day 3 administered as 4 mg (1 tablet) before breakfa st, 4 mg (1 tablet) after lunch, 4 mg (1 tablet) after supper, and 4 mg (1 tablet) at bedtime. Day 4: 12 mg on day 4 administered as 4 mg (1 tablet) before breakfast, 4 mg (1 tablet) after lunch, and 4 mg (1 tablet) at bedtime. Day 5: 8 mg on day 5 admin istered as 4 mg (1 tablet) before breakfast and 4 mg (1 tablet) at bedtime. Day 6: 4 mg on day 6 administered as 4 mg (1 tablet) before breakfast., Normal, Disp-1 Dose Pack, R-0         CONTINUE these medications which have NOT CHANGED    Details   dilTIAZem ER (CARDIZEM CD) 120 mg capsule Take 1 Capsule by mouth daily. , Normal, Disp-30 Capsule, R-1      famotidine (PEPCID) 40 mg tablet Take 1 Tablet by mouth two (2) times a day., Normal, Disp-60 Tablet, R-0      apixaban (Eliquis) 5 mg tablet Eliquis 5 mg tablet   Take 1 tablet twice a day by oral route., Historical Med      glipiZIDE SR (GLUCOTROL XL) 5 mg CR tablet glipizide ER 5 mg tablet, extended release 24 hr, Historical Med      amLODIPine (NORVASC) 10 mg tablet amlodipine 10 mg tablet, Historical Med      pravastatin (PRAVACHOL) 80 mg tablet pravastatin 80 mg tablet, Historical Med      ramipriL (ALTACE) 10 mg capsule Historical Med      chlorthalidone (HYGROTON) 25 mg tablet chlorthalidone 25 mg tablet   TAKE 1 TABLET BY MOUTH EVERY DAY, Historical Med      gabapentin (NEURONTIN) 100 mg capsule gabapentin 100 mg capsule   TAKE 1 CAPSULE AT BEDTIME THEN SLOWLY INCREASE TO 1 CAPSULE 3 TIMES A DAY, Historical Med      magnesium oxide (MAG-OX) 400 mg tablet magnesium oxide 400 mg (241.3 mg magnesium) tablet   Take 1 tablet every day by oral route., Historical Med      oxybutynin (DITROPAN) 5 mg tablet 1 TABLET AT BEDTIME ORALLY ONCE A DAY 10 DAYS, Historical Med         STOP taking these medications       predniSONE (DELTASONE) 10 mg tablet Comments:   Reason for Stopping:               Diagnosis     Clinical Impression:   1. Acute bronchitis, unspecified organism    2. Stridor        Attestations: Winda Kayser, MD    Please note that this dictation was completed with [employer], the [employer] voice recognition software. Quite often unanticipated grammatical, syntax, homophones, and other interpretive errors are inadvertently transcribed by the computer software. Please disregard these errors. Please excuse any errors that have escaped final proofreading. Thank you.

## 2022-02-21 LAB
ATRIAL RATE: 86 BPM
CALCULATED P AXIS, ECG09: 52 DEGREES
CALCULATED R AXIS, ECG10: -50 DEGREES
CALCULATED T AXIS, ECG11: 114 DEGREES
DIAGNOSIS, 93000: NORMAL
P-R INTERVAL, ECG05: 198 MS
Q-T INTERVAL, ECG07: 390 MS
QRS DURATION, ECG06: 114 MS
QTC CALCULATION (BEZET), ECG08: 466 MS
VENTRICULAR RATE, ECG03: 86 BPM

## 2022-02-24 ENCOUNTER — APPOINTMENT (OUTPATIENT)
Dept: GENERAL RADIOLOGY | Age: 72
End: 2022-02-24
Attending: STUDENT IN AN ORGANIZED HEALTH CARE EDUCATION/TRAINING PROGRAM
Payer: MEDICARE

## 2022-02-24 ENCOUNTER — HOSPITAL ENCOUNTER (EMERGENCY)
Age: 72
Discharge: HOME | End: 2022-02-24
Attending: STUDENT IN AN ORGANIZED HEALTH CARE EDUCATION/TRAINING PROGRAM
Payer: MEDICARE

## 2022-02-24 VITALS
HEIGHT: 64 IN | DIASTOLIC BLOOD PRESSURE: 63 MMHG | OXYGEN SATURATION: 98 % | HEART RATE: 63 BPM | RESPIRATION RATE: 18 BRPM | WEIGHT: 237 LBS | TEMPERATURE: 98.2 F | BODY MASS INDEX: 40.46 KG/M2 | SYSTOLIC BLOOD PRESSURE: 151 MMHG

## 2022-02-24 DIAGNOSIS — R07.9 CHEST PAIN, UNSPECIFIED TYPE: Primary | ICD-10-CM

## 2022-02-24 LAB
ALBUMIN SERPL-MCNC: 3.1 G/DL (ref 3.5–5)
ALBUMIN/GLOB SERPL: 0.9 {RATIO} (ref 1.1–2.2)
ALP SERPL-CCNC: 203 U/L (ref 45–117)
ALT SERPL-CCNC: 54 U/L (ref 12–78)
ANION GAP SERPL CALC-SCNC: 3 MMOL/L (ref 5–15)
AST SERPL W P-5'-P-CCNC: 36 U/L (ref 15–37)
ATRIAL RATE: 65 BPM
BASOPHILS # BLD: 0 K/UL (ref 0–0.1)
BASOPHILS NFR BLD: 0 % (ref 0–1)
BILIRUB SERPL-MCNC: 0.3 MG/DL (ref 0.2–1)
BUN SERPL-MCNC: 24 MG/DL (ref 6–20)
BUN/CREAT SERPL: 41 (ref 12–20)
CA-I BLD-MCNC: 9.8 MG/DL (ref 8.5–10.1)
CALCULATED P AXIS, ECG09: 47 DEGREES
CALCULATED R AXIS, ECG10: -50 DEGREES
CALCULATED T AXIS, ECG11: 98 DEGREES
CHLORIDE SERPL-SCNC: 107 MMOL/L (ref 97–108)
CO2 SERPL-SCNC: 31 MMOL/L (ref 21–32)
CREAT SERPL-MCNC: 0.59 MG/DL (ref 0.55–1.02)
DIAGNOSIS, 93000: NORMAL
DIFFERENTIAL METHOD BLD: NORMAL
EOSINOPHIL # BLD: 0 K/UL (ref 0–0.4)
EOSINOPHIL NFR BLD: 1 % (ref 0–7)
ERYTHROCYTE [DISTWIDTH] IN BLOOD BY AUTOMATED COUNT: 12.6 % (ref 11.5–14.5)
GLOBULIN SER CALC-MCNC: 3.4 G/DL (ref 2–4)
GLUCOSE SERPL-MCNC: 198 MG/DL (ref 65–100)
HCT VFR BLD AUTO: 38.7 % (ref 35–47)
HGB BLD-MCNC: 12.6 G/DL (ref 11.5–16)
IMM GRANULOCYTES # BLD AUTO: 0 K/UL (ref 0–0.04)
IMM GRANULOCYTES NFR BLD AUTO: 0 % (ref 0–0.5)
LYMPHOCYTES # BLD: 2.1 K/UL (ref 0.8–3.5)
LYMPHOCYTES NFR BLD: 32 % (ref 12–49)
MCH RBC QN AUTO: 27.9 PG (ref 26–34)
MCHC RBC AUTO-ENTMCNC: 32.6 G/DL (ref 30–36.5)
MCV RBC AUTO: 85.6 FL (ref 80–99)
MONOCYTES # BLD: 0.7 K/UL (ref 0–1)
MONOCYTES NFR BLD: 10 % (ref 5–13)
NEUTS SEG # BLD: 3.7 K/UL (ref 1.8–8)
NEUTS SEG NFR BLD: 57 % (ref 32–75)
NRBC # BLD: 0 K/UL (ref 0–0.01)
NRBC BLD-RTO: 0 PER 100 WBC
P-R INTERVAL, ECG05: 160 MS
PLATELET # BLD AUTO: 208 K/UL (ref 150–400)
PMV BLD AUTO: 11.6 FL (ref 8.9–12.9)
POTASSIUM SERPL-SCNC: 4.2 MMOL/L (ref 3.5–5.1)
PROT SERPL-MCNC: 6.5 G/DL (ref 6.4–8.2)
Q-T INTERVAL, ECG07: 408 MS
QRS DURATION, ECG06: 122 MS
QTC CALCULATION (BEZET), ECG08: 424 MS
RBC # BLD AUTO: 4.52 M/UL (ref 3.8–5.2)
SODIUM SERPL-SCNC: 141 MMOL/L (ref 136–145)
TROPONIN-HIGH SENSITIVITY: 31 NG/L (ref 0–51)
TROPONIN-HIGH SENSITIVITY: 33 NG/L (ref 0–51)
VENTRICULAR RATE, ECG03: 65 BPM
WBC # BLD AUTO: 6.5 K/UL (ref 3.6–11)

## 2022-02-24 PROCEDURE — 93005 ELECTROCARDIOGRAM TRACING: CPT

## 2022-02-24 PROCEDURE — 99285 EMERGENCY DEPT VISIT HI MDM: CPT

## 2022-02-24 PROCEDURE — 84484 ASSAY OF TROPONIN QUANT: CPT

## 2022-02-24 PROCEDURE — 85025 COMPLETE CBC W/AUTO DIFF WBC: CPT

## 2022-02-24 PROCEDURE — 71045 X-RAY EXAM CHEST 1 VIEW: CPT

## 2022-02-24 PROCEDURE — 80053 COMPREHEN METABOLIC PANEL: CPT

## 2022-02-24 PROCEDURE — 36415 COLL VENOUS BLD VENIPUNCTURE: CPT

## 2022-02-24 PROCEDURE — 74011250637 HC RX REV CODE- 250/637: Performed by: STUDENT IN AN ORGANIZED HEALTH CARE EDUCATION/TRAINING PROGRAM

## 2022-02-24 RX ORDER — METHOCARBAMOL 750 MG/1
1500 TABLET, FILM COATED ORAL ONCE
Status: COMPLETED | OUTPATIENT
Start: 2022-02-24 | End: 2022-02-24

## 2022-02-24 RX ORDER — ACETAMINOPHEN 325 MG/1
650 TABLET ORAL
Qty: 20 TABLET | Refills: 0 | Status: SHIPPED | OUTPATIENT
Start: 2022-02-24

## 2022-02-24 RX ORDER — METHOCARBAMOL 750 MG/1
750 TABLET, FILM COATED ORAL
Qty: 20 TABLET | Refills: 0 | Status: SHIPPED | OUTPATIENT
Start: 2022-02-24

## 2022-02-24 RX ORDER — ACETAMINOPHEN 325 MG/1
650 TABLET ORAL
Status: COMPLETED | OUTPATIENT
Start: 2022-02-24 | End: 2022-02-24

## 2022-02-24 RX ADMIN — METHOCARBAMOL 1500 MG: 750 TABLET ORAL at 02:28

## 2022-02-24 RX ADMIN — ACETAMINOPHEN 325MG 650 MG: 325 TABLET ORAL at 02:27

## 2022-02-24 NOTE — ED NOTES
Pt has been stuck 6 times by multiple nurses. Attempted to pull blood off the left forearm iv. Iv doesn't pull back blood. Attempted periferal stick for repeat troponin. Unsuccessful. Offered to have another nurse attempt. Pt is refusing any further sticks. Pt stated \"I am not going to be a pin cushion\". Dr Glenys Salomon made aware.

## 2022-02-24 NOTE — ED TRIAGE NOTES
States left sided chest pain started about 30 mins ago.  States she has been feeling racing on and off today

## 2022-02-24 NOTE — ED PROVIDER NOTES
[facility]  EMERGENCY DEPARTMENT ENCOUNTER NOTE    Date: 2/24/2022  Patient Name: Johnna Christian    History of Presenting Illness     Chief Complaint   Patient presents with    Chest Pain     HPI: Johnna Christian, 66 y.o. female with a past medical history and outpatient medications as listed and reviewed below  presents for chest pain. She reports that for the past 2 days she has been having some palpitations with her known history of A. fib that lasted for few minutes and resolved. Today, she reports left-sided stabbing chest pain that worsens with palpation. No shortness of breath. No lightheadedness, dizziness, or syncope. No lower extremity swelling. No fevers, chills, cough, or hemoptysis. No abdominal pain, nausea, or vomiting. Medical History   I reviewed the medical, surgical, family, and social history, as well as allergies:    PCP: Padma Lopes MD    Past Medical History:  Past Medical History:   Diagnosis Date    Diabetes ([facility] [address]     High cholesterol     Hypertension      Past Surgical History:  Past Surgical History:   Procedure Laterality Date    HX HEMORRHOIDECTOMY      HX TONSILLECTOMY      HX WISDOM TEETH EXTRACTION       Current Outpatient Medications:  Current Outpatient Medications   Medication Instructions    acetaminophen (TYLENOL) 650 mg, Oral, EVERY 6 HOURS AS NEEDED    amLODIPine (NORVASC) 10 mg tablet amlodipine 10 mg tablet    amoxicillin-clavulanate (Augmentin) 875-125 mg per tablet 1 Tablet, Oral, 2 TIMES DAILY    apixaban (Eliquis) 5 mg tablet Eliquis 5 mg tablet   Take 1 tablet twice a day by oral route.     chlorthalidone (HYGROTON) 25 mg tablet chlorthalidone 25 mg tablet   TAKE 1 TABLET BY MOUTH EVERY DAY    dilTIAZem ER (CARDIZEM CD) 120 mg, Oral, DAILY    famotidine (PEPCID) 40 mg, Oral, 2 TIMES DAILY    gabapentin (NEURONTIN) 100 mg capsule gabapentin 100 mg capsule   TAKE 1 CAPSULE AT BEDTIME THEN SLOWLY INCREASE TO 1 CAPSULE 3 TIMES A DAY    glipiZIDE SR (GLUCOTROL XL) 5 mg CR tablet glipizide ER 5 mg tablet, extended release 24 hr    magnesium oxide (MAG-OX) 400 mg tablet magnesium oxide 400 mg (241.3 mg magnesium) tablet   Take 1 tablet every day by oral route.  methocarbamoL (ROBAXIN-750) 750 mg, Oral, 3 TIMES DAILY AS NEEDED    methylPREDNISolone (Medrol, Jeramie,) 4 mg tablet Day 1: 24 mg on day 1 administered as 8 mg (2 tablets) before breakfast, 4 mg (1 tablet) after lunch, 4 mg (1 tablet) after supper, and 8 mg (2 tablets) at bedtime or 24 mg (6 tablets) as a single dose or divided into 2 or 3 doses upon initiation (regardless of time of day). Day 2: 20 mg on day 2 administered as 4 mg (1 tablet) before breakfast, 4 mg (1 tablet) after lunch, 4 mg (1 tablet) after supper, and 8 mg (2 tablets) at bedtime. Day 3: 16 mg on day 3 administered as 4 mg (1 tablet) before breakfast, 4 mg (1 tablet) after lunch, 4 mg (1 tablet) after supper, and 4 mg (1 tablet) at bedtime. Day 4: 12 mg on day 4 administered as 4 mg (1 tablet) before breakfast, 4 mg (1 tablet) after lunch, and 4 mg (1 tablet) at bedtime. Day 5: 8 mg on day 5 administered as 4 mg (1 tablet) before breakfast and 4 mg (1 tablet) at bedtime. Day 6: 4 mg on day 6 administered as 4 mg (1 tablet) before breakfast.    oxybutynin (DITROPAN) 5 mg tablet 1 TABLET AT BEDTIME ORALLY ONCE A DAY 10 DAYS    pravastatin (PRAVACHOL) 80 mg tablet pravastatin 80 mg tablet    ramipriL (ALTACE) 10 mg capsule No dose, route, or frequency recorded. Family History:  History reviewed. No pertinent family history. Social History:  Social History     Tobacco Use    Smoking status: Never Smoker    Smokeless tobacco: Never Used   Substance Use Topics    Alcohol use: Never    Drug use: Never     Allergies:   Allergies   Allergen Reactions    Doxycycline Unknown (comments)     HIVES    Metoprolol Unknown (comments)     HIVES       Review of Systems     Review of Systems  Negative: All other systems negative. Physical Exam and Vital Signs   Vital Signs - Reviewed the patient's vital signs. Patient Vitals for the past 12 hrs:   Temp Pulse Resp BP SpO2   02/24/22 0425 -- 63 16 (!) 142/60 95 %   02/24/22 0228 -- (!) 59 22 139/65 98 %   02/24/22 0122 -- 61 23 (!) 140/62 97 %   02/24/22 0048 98.2 °F (36.8 °C) 64 18 (!) 140/67 99 %     Physical Exam:    GENERAL: awake, alert, cooperative, not in distress  HEENT:  * Pupils equal, EOMI  * Head atraumatic  CV:  * regular rhythm  * warm and perfused extremities bilaterally  * chest wall tenderness to palpation  PULMONARY: Good air movement, no wheezes or crackles  ABDOMEN/: soft, no distension, no guarding, no suprapubic tenderness, no abdominal tenderness  EXTREMITIES/BACK: warm and perfused, no tenderness, no edema  SKIN: no rashes or signs of trauma  NEURO:  * Speech clear  * Moves U&LE to command    Medical Decision Making   - I am the first and primary provider for this patient and am the primary provider of record. - I reviewed the vital signs, available nursing notes, past medical history, past surgical history, family history and social history. - Initial assessment performed. The patients presenting problems have been discussed, and the staff are in agreement with the care plan formulated and outlined with them. I have encouraged them to ask questions as they arise throughout their visit. - Available medical records, nursing notes, old EKGs, and EMS run sheets (if patient was EMS transported) were reviewed    MDM:   Patient is a 66 y.o. female presenting for chest pain. Vitals reveal no significant abnormalities and physical exam reveals chest wall tenderness over left side. EKG showed LAFB. Based on the history, physical exam, risk factors, and vitals signs, differential includes: ACS, CHF, pulmonary, pleural effusion, pneumothorax, musculoskeletal chest pain. Will initiate workup and symptomatic treatment. See ED Course and Reassessment for results and interpretations. Results     Labs:  Recent Results (from the past 12 hour(s))   METABOLIC PANEL, COMPREHENSIVE    Collection Time: 02/24/22  1:16 AM   Result Value Ref Range    Sodium 141 136 - 145 mmol/L    Potassium 4.2 3.5 - 5.1 mmol/L    Chloride 107 97 - 108 mmol/L    CO2 31 21 - 32 mmol/L    Anion gap 3 (L) 5 - 15 mmol/L    Glucose 198 (H) 65 - 100 mg/dL    BUN 24 (H) 6 - 20 mg/dL    Creatinine 0.59 0.55 - 1.02 mg/dL    BUN/Creatinine ratio 41 (H) 12 - 20      GFR est AA >60 >60 ml/min/1.73m2    GFR est non-AA >60 >60 ml/min/1.73m2    Calcium 9.8 8.5 - 10.1 mg/dL    Bilirubin, total 0.3 0.2 - 1.0 mg/dL    AST (SGOT) 36 15 - 37 U/L    ALT (SGPT) 54 12 - 78 U/L    Alk. phosphatase 203 (H) 45 - 117 U/L    Protein, total 6.5 6.4 - 8.2 g/dL    Albumin 3.1 (L) 3.5 - 5.0 g/dL    Globulin 3.4 2.0 - 4.0 g/dL    A-G Ratio 0.9 (L) 1.1 - 2.2     TROPONIN-HIGH SENSITIVITY    Collection Time: 02/24/22  1:16 AM   Result Value Ref Range    Troponin-High Sensitivity 33 0 - 51 ng/L   TROPONIN-HIGH SENSITIVITY    Collection Time: 02/24/22  3:21 AM   Result Value Ref Range    Troponin-High Sensitivity 31 0 - 51 ng/L   CBC WITH AUTOMATED DIFF    Collection Time: 02/24/22  3:21 AM   Result Value Ref Range    WBC 6.5 3.6 - 11.0 K/uL    RBC 4.52 3.80 - 5.20 M/uL    HGB 12.6 11.5 - 16.0 g/dL    HCT 38.7 35.0 - 47.0 %    MCV 85.6 80.0 - 99.0 FL    MCH 27.9 26.0 - 34.0 PG    MCHC 32.6 30.0 - 36.5 g/dL    RDW 12.6 11.5 - 14.5 %    PLATELET 343 982 - 329 K/uL    MPV 11.6 8.9 - 12.9 FL    NRBC 0.0 0.0  WBC    ABSOLUTE NRBC 0.00 0.00 - 0.01 K/uL    NEUTROPHILS 57 32 - 75 %    LYMPHOCYTES 32 12 - 49 %    MONOCYTES 10 5 - 13 %    EOSINOPHILS 1 0 - 7 %    BASOPHILS 0 0 - 1 %    IMMATURE GRANULOCYTES 0 0 - 0.5 %    ABS. NEUTROPHILS 3.7 1.8 - 8.0 K/UL    ABS. LYMPHOCYTES 2.1 0.8 - 3.5 K/UL    ABS. MONOCYTES 0.7 0.0 - 1.0 K/UL    ABS. EOSINOPHILS 0.0 0.0 - 0.4 K/UL    ABS.  BASOPHILS 0.0 0.0 - 0.1 K/UL    ABS. IMM. GRANS. 0.0 0.00 - 0.04 K/UL    DF AUTOMATED       Radiologic Studies:  CT Results  (Last 48 hours)    None        CXR Results  (Last 48 hours)               02/24/22 0134  XR CHEST PORT Final result    Impression:  No evidence of an acute cardiopulmonary process. Narrative:  XR CHEST PORT       Comparison:  2/19/2022. Single view: The lungs are clear. No pneumothorax or pleural effusion apparent. The cardiomediastinum is unremarkable. There is no evidence of acute cardiac   decompensation. Medications ordered:  Medications   acetaminophen (TYLENOL) tablet 650 mg (650 mg Oral Given 2/24/22 0227)   methocarbamoL (ROBAXIN) tablet 1,500 mg (1,500 mg Oral Given 2/24/22 8514)     ED Course and Reassessment     ED Course:     ED Course as of 02/24/22 0552   Thu Feb 24, 2022   0214 No significant electrolyte derangements. Creatinine is not elevated more than baseline range making RODRIGO unlikely. No significant transaminitis noted. Normal bilirubin. Trop 33, will trend. [SS]   0214 Chest x-ray negative for acute pathology: no concern for pulmonary edema, pleural effusion, pneumothorax, or pneumonia. [SS]   8216 Chest pain reported to be improved. Pending repeat trop. [SS]   0727 CBC does not show any evidence of acute process. Leukocytosis not present to suggest infection. Hemoglobin not suggestive of acute anemia. Platelet count is normal.   [SS]   0550 Second troponin with negative delta change. ACS ruled out. [SS]      ED Course User Index  [SS] Kellie Camacho MD       Reassessment:    The patient presented with chest pain of uncertain etiology. Based on the patient's historical features and ED evaluation, in addition to the patient's reassuring physical exam, I do not see compelling evidence at this time for a malignant etiology for the patient's chest pain.      The patient does not have clinical evidence for pulmonary embolus, malignant cardiac, pulmonary or aortic pathology or the other more malignant etiologies for chest pain. Based on the abovementioned evaluation, a malignant process is unlikely. After completion of workup and discussion of results and diagnoses with the patient, all the patient's questions were answered. If required, all follow up appointments and treatments were discussed and explained. The patient sounded understanding and agrees with the plan. The patient understands that at this time there is no evidence for a more malignant underlying process, but the patient also understands that early in the process of an illness, an emergency department workup can be falsely reassuring. Routine discharge counseling was given to the patient and the patient understands that worsening, changing or persistent symptoms should prompt an immediate call or follow up with their primary physician or the emergency department. The importance of appropriate follow up was also discussed with the patient. More extensive discharge instructions were given in the patient's discharge paperwork. Final Disposition     Discharge: DISCHARGED FROM EMERGENCY DEPARTMENT    Patient will be discharged from the Emergency Department in stable condition. All of the diagnostic tests were reviewed and any questions were answered. Diagnosis, results, follow up if applicable, and return precautions were discussed. I have also put together printed discharge instructions for them that include: 1) educational information regarding their diagnosis, 2) how to care for their diagnosis at home, as well a 3) list of reasons why they would want to return to the ED prior to their follow-up appointment, should their condition change. Any labs or imaging done in the ED will be either printed with the discharge paperwork or available through [address] DISCHARGE PLAN:  1.    Current Discharge Medication List      START taking these medications    Details   acetaminophen (TYLENOL) 325 mg tablet Take 2 Tablets by mouth every six (6) hours as needed for Pain. Qty: 20 Tablet, Refills: 0      methocarbamoL (Robaxin-750) 750 mg tablet Take 1 Tablet by mouth three (3) times daily as needed for Muscle Spasm(s). Qty: 20 Tablet, Refills: 0         CONTINUE these medications which have NOT CHANGED    Details   amoxicillin-clavulanate (Augmentin) 875-125 mg per tablet Take 1 Tablet by mouth two (2) times a day. Qty: 20 Tablet, Refills: 0      methylPREDNISolone (Medrol, Jeramie,) 4 mg tablet Day 1: 24 mg on day 1 administered as 8 mg (2 tablets) before breakfast, 4 mg (1 tablet) after lunch, 4 mg (1 tablet) after supper, and 8 mg (2 tablets) at bedtime or 24 mg (6 tablets) as a single dose or divided into 2 or 3 doses upon initiation (regardless of time of day). Day 2: 20 mg on day 2 administered as 4 mg (1 tablet) before breakfast, 4 mg (1 tablet) after lunch, 4 mg (1 tablet) after supper, and 8 mg (2 tablets) at bedtime. Day 3: 16 mg on day 3 administered as 4 mg (1 tablet) before breakfast, 4 mg (1 tablet) after lunch, 4 mg (1 tablet) after supper, and 4 mg (1 tablet) at bedtime. Day 4: 12 mg on day 4 administered as 4 mg (1 tablet) before breakfast, 4 mg (1 tablet) after lunch, and 4 mg (1 tablet) at bedtime. Day 5: 8 mg on day 5 administered as 4 mg (1 tablet) before breakfast and 4 mg (1 tablet) at bedtime. Day 6: 4 mg on day 6 administered as 4 mg (1 tablet) before breakfast.  Qty: 1 Dose Pack, Refills: 0      dilTIAZem ER (CARDIZEM CD) 120 mg capsule Take 1 Capsule by mouth daily. Qty: 30 Capsule, Refills: 1      famotidine (PEPCID) 40 mg tablet Take 1 Tablet by mouth two (2) times a day. Qty: 60 Tablet, Refills: 0      apixaban (Eliquis) 5 mg tablet Eliquis 5 mg tablet   Take 1 tablet twice a day by oral route.       glipiZIDE SR (GLUCOTROL XL) 5 mg CR tablet glipizide ER 5 mg tablet, extended release 24 hr      amLODIPine (NORVASC) 10 mg tablet amlodipine 10 mg tablet      pravastatin (PRAVACHOL) 80 mg tablet pravastatin 80 mg tablet      ramipriL (ALTACE) 10 mg capsule       chlorthalidone (HYGROTON) 25 mg tablet chlorthalidone 25 mg tablet   TAKE 1 TABLET BY MOUTH EVERY DAY      gabapentin (NEURONTIN) 100 mg capsule gabapentin 100 mg capsule   TAKE 1 CAPSULE AT BEDTIME THEN SLOWLY INCREASE TO 1 CAPSULE 3 TIMES A DAY      magnesium oxide (MAG-OX) 400 mg tablet magnesium oxide 400 mg (241.3 mg magnesium) tablet   Take 1 tablet every day by oral route. oxybutynin (DITROPAN) 5 mg tablet 1 TABLET AT BEDTIME ORALLY ONCE A DAY 10 DAYS            2. Follow-up Information     Follow up With Specialties Details Why Contact Info    Emely Hodge MD Family Medicine Schedule an appointment as soon as possible for a visit in 3 days  [address] [facility] [address] [phone]      [address] [place] [address] DEPT Emergency Medicine Go to  If symptoms worsen [address] [facility] [address] [phone]        3. Return to ED if worse    4. Current Discharge Medication List      START taking these medications    Details   acetaminophen (TYLENOL) 325 mg tablet Take 2 Tablets by mouth every six (6) hours as needed for Pain. Qty: 20 Tablet, Refills: 0  Start date: 2/24/2022      methocarbamoL (Robaxin-750) 750 mg tablet Take 1 Tablet by mouth three (3) times daily as needed for Muscle Spasm(s). Qty: 20 Tablet, Refills: 0  Start date: 2/24/2022             ED Procedures   Performed by: Inocencio Rivera MD  Procedures     EKG interpretation (Preliminary):  Rhythm: normal sinus rhythm; and regular . Rate (approx.): 63.  Axis: LAFB - left  SC interval: normal;  QRS interval: normal ;  ST/T wave: normal;  Other findings: LAFB    Diagnosis     Clinical Impression:   1. Chest pain, unspecified type      Attestations:    Inocencio Rivera MD    Please note that this dictation was completed with [employer], the [employer] voice recognition software.   Quite often unanticipated grammatical, syntax, homophones, and other interpretive errors are inadvertently transcribed by the computer software. Please disregard these errors. Please excuse any errors that have escaped final proofreading. Thank you.

## 2022-02-24 NOTE — DISCHARGE INSTRUCTIONS
Thank you! Thank you for allowing me to care for you in the emergency department. I sincerely hope that you are satisfied with your visit today. It is my goal to provide you with excellent care. Below you will find a list of your labs and imaging from your visit today. Should you have any questions regarding these results please do not hesitate to call the emergency department. Labs -     Recent Results (from the past 12 hour(s))   METABOLIC PANEL, COMPREHENSIVE    Collection Time: 02/24/22  1:16 AM   Result Value Ref Range    Sodium 141 136 - 145 mmol/L    Potassium 4.2 3.5 - 5.1 mmol/L    Chloride 107 97 - 108 mmol/L    CO2 31 21 - 32 mmol/L    Anion gap 3 (L) 5 - 15 mmol/L    Glucose 198 (H) 65 - 100 mg/dL    BUN 24 (H) 6 - 20 mg/dL    Creatinine 0.59 0.55 - 1.02 mg/dL    BUN/Creatinine ratio 41 (H) 12 - 20      GFR est AA >60 >60 ml/min/1.73m2    GFR est non-AA >60 >60 ml/min/1.73m2    Calcium 9.8 8.5 - 10.1 mg/dL    Bilirubin, total 0.3 0.2 - 1.0 mg/dL    AST (SGOT) 36 15 - 37 U/L    ALT (SGPT) 54 12 - 78 U/L    Alk.  phosphatase 203 (H) 45 - 117 U/L    Protein, total 6.5 6.4 - 8.2 g/dL    Albumin 3.1 (L) 3.5 - 5.0 g/dL    Globulin 3.4 2.0 - 4.0 g/dL    A-G Ratio 0.9 (L) 1.1 - 2.2     TROPONIN-HIGH SENSITIVITY    Collection Time: 02/24/22  1:16 AM   Result Value Ref Range    Troponin-High Sensitivity 33 0 - 51 ng/L   TROPONIN-HIGH SENSITIVITY    Collection Time: 02/24/22  3:21 AM   Result Value Ref Range    Troponin-High Sensitivity 31 0 - 51 ng/L   CBC WITH AUTOMATED DIFF    Collection Time: 02/24/22  3:21 AM   Result Value Ref Range    WBC 6.5 3.6 - 11.0 K/uL    RBC 4.52 3.80 - 5.20 M/uL    HGB 12.6 11.5 - 16.0 g/dL    HCT 38.7 35.0 - 47.0 %    MCV 85.6 80.0 - 99.0 FL    MCH 27.9 26.0 - 34.0 PG    MCHC 32.6 30.0 - 36.5 g/dL    RDW 12.6 11.5 - 14.5 %    PLATELET 012 012 - 600 K/uL    MPV 11.6 8.9 - 12.9 FL    NRBC 0.0 0.0  WBC    ABSOLUTE NRBC 0.00 0.00 - 0.01 K/uL    NEUTROPHILS 57 32 - 75 %    LYMPHOCYTES 32 12 - 49 %    MONOCYTES 10 5 - 13 %    EOSINOPHILS 1 0 - 7 %    BASOPHILS 0 0 - 1 %    IMMATURE GRANULOCYTES 0 0 - 0.5 %    ABS. NEUTROPHILS 3.7 1.8 - 8.0 K/UL    ABS. LYMPHOCYTES 2.1 0.8 - 3.5 K/UL    ABS. MONOCYTES 0.7 0.0 - 1.0 K/UL    ABS. EOSINOPHILS 0.0 0.0 - 0.4 K/UL    ABS. BASOPHILS 0.0 0.0 - 0.1 K/UL    ABS. IMM. GRANS. 0.0 0.00 - 0.04 K/UL    DF AUTOMATED         Radiologic Studies -   XR CHEST PORT   Final Result   No evidence of an acute cardiopulmonary process. CT Results  (Last 48 hours)      None          CXR Results  (Last 48 hours)                 02/24/22 0134  XR CHEST PORT Final result    Impression:  No evidence of an acute cardiopulmonary process. Narrative:  XR CHEST PORT       Comparison:  2/19/2022. Single view: The lungs are clear. No pneumothorax or pleural effusion apparent. The cardiomediastinum is unremarkable. There is no evidence of acute cardiac   decompensation. If you feel that you have not received excellent quality care or timely care, please ask to speak to the nurse manager. Please choose us in the future for your continued health care needs. ------------------------------------------------------------------------------------------------------------  The exam and treatment you received in the Emergency Department were for an urgent problem and are not intended as complete care. It is important that you follow-up with a doctor, nurse practitioner, or physician assistant to:  (1) confirm your diagnosis,  (2) re-evaluation of changes in your illness and treatment, and  (3) for ongoing care. If your symptoms become worse or you do not improve as expected and you are unable to reach your usual health care provider, you should return to the Emergency Department. We are available 24 hours a day. Please take your discharge instructions with you when you go to your follow-up appointment.      If a prescription has been provided, please have it filled as soon as possible to prevent a delay in treatment. Read the entire medication instruction sheet provided to you by the pharmacy. If you have any questions or reservations about taking the medication due to side effects or interactions with other medications, please call your primary care physician or contact the ER to speak with the charge nurse. Make an appointment with your family doctor or the physician you were referred to for follow-up of this visit as instructed on your discharge paperwork, as this is a mandatory follow-up. Return to the ER if you are unable to be seen or if you are unable to be seen in a timely manner. If you have any problem arranging the follow-up visit, contact the Emergency Department immediately.

## 2022-03-19 PROBLEM — R06.02 SOB (SHORTNESS OF BREATH): Status: ACTIVE | Noted: 2021-10-05

## 2022-03-20 PROBLEM — R06.00 DYSPNEA: Status: ACTIVE | Noted: 2021-10-05

## 2023-01-09 ENCOUNTER — APPOINTMENT (OUTPATIENT)
Dept: CT IMAGING | Age: 73
DRG: 389 | End: 2023-01-09
Attending: EMERGENCY MEDICINE
Payer: MEDICARE

## 2023-01-09 ENCOUNTER — HOSPITAL ENCOUNTER (INPATIENT)
Age: 73
LOS: 7 days | Discharge: HOME HEALTH | DRG: 389 | End: 2023-01-16
Attending: EMERGENCY MEDICINE | Admitting: FAMILY MEDICINE
Payer: MEDICARE

## 2023-01-09 DIAGNOSIS — K56.609 SMALL BOWEL OBSTRUCTION (HCC): Primary | ICD-10-CM

## 2023-01-09 PROBLEM — K56.600 PARTIAL SMALL BOWEL OBSTRUCTION (HCC): Status: ACTIVE | Noted: 2023-01-09

## 2023-01-09 LAB
ALBUMIN SERPL-MCNC: 3.5 G/DL (ref 3.5–5)
ALBUMIN/GLOB SERPL: 0.9 (ref 1.1–2.2)
ALP SERPL-CCNC: 124 U/L (ref 45–117)
ALT SERPL-CCNC: 17 U/L (ref 12–78)
ANION GAP SERPL CALC-SCNC: 3 MMOL/L (ref 5–15)
AST SERPL W P-5'-P-CCNC: 11 U/L (ref 15–37)
BASOPHILS # BLD: 0 K/UL (ref 0–0.1)
BASOPHILS NFR BLD: 1 % (ref 0–1)
BILIRUB SERPL-MCNC: 0.4 MG/DL (ref 0.2–1)
BUN SERPL-MCNC: 19 MG/DL (ref 6–20)
BUN/CREAT SERPL: 30 (ref 12–20)
CA-I BLD-MCNC: 10.4 MG/DL (ref 8.5–10.1)
CHLORIDE SERPL-SCNC: 107 MMOL/L (ref 97–108)
CO2 SERPL-SCNC: 32 MMOL/L (ref 21–32)
CREAT SERPL-MCNC: 0.63 MG/DL (ref 0.55–1.02)
DIFFERENTIAL METHOD BLD: ABNORMAL
EOSINOPHIL # BLD: 0.1 K/UL (ref 0–0.4)
EOSINOPHIL NFR BLD: 2 % (ref 0–7)
ERYTHROCYTE [DISTWIDTH] IN BLOOD BY AUTOMATED COUNT: 12.9 % (ref 11.5–14.5)
GLOBULIN SER CALC-MCNC: 4 G/DL (ref 2–4)
GLUCOSE SERPL-MCNC: 92 MG/DL (ref 65–100)
HCT VFR BLD AUTO: 39.7 % (ref 35–47)
HGB BLD-MCNC: 12.3 G/DL (ref 11.5–16)
IMM GRANULOCYTES # BLD AUTO: 0 K/UL (ref 0–0.04)
IMM GRANULOCYTES NFR BLD AUTO: 0 % (ref 0–0.5)
LIPASE SERPL-CCNC: 141 U/L (ref 73–393)
LYMPHOCYTES # BLD: 2 K/UL (ref 0.8–3.5)
LYMPHOCYTES NFR BLD: 45 % (ref 12–49)
MCH RBC QN AUTO: 28 PG (ref 26–34)
MCHC RBC AUTO-ENTMCNC: 31 G/DL (ref 30–36.5)
MCV RBC AUTO: 90.4 FL (ref 80–99)
MONOCYTES # BLD: 0.5 K/UL (ref 0–1)
MONOCYTES NFR BLD: 12 % (ref 5–13)
NEUTS SEG # BLD: 1.7 K/UL (ref 1.8–8)
NEUTS SEG NFR BLD: 40 % (ref 32–75)
NRBC # BLD: 0 K/UL (ref 0–0.01)
NRBC BLD-RTO: 0 PER 100 WBC
PLATELET # BLD AUTO: 172 K/UL (ref 150–400)
PMV BLD AUTO: 10.8 FL (ref 8.9–12.9)
POTASSIUM SERPL-SCNC: 3.8 MMOL/L (ref 3.5–5.1)
PROT SERPL-MCNC: 7.5 G/DL (ref 6.4–8.2)
RBC # BLD AUTO: 4.39 M/UL (ref 3.8–5.2)
SODIUM SERPL-SCNC: 142 MMOL/L (ref 136–145)
WBC # BLD AUTO: 4.3 K/UL (ref 3.6–11)

## 2023-01-09 PROCEDURE — 74174 CTA ABD&PLVS W/CONTRAST: CPT

## 2023-01-09 PROCEDURE — 93005 ELECTROCARDIOGRAM TRACING: CPT

## 2023-01-09 PROCEDURE — 74011000636 HC RX REV CODE- 636: Performed by: EMERGENCY MEDICINE

## 2023-01-09 PROCEDURE — 65270000029 HC RM PRIVATE

## 2023-01-09 PROCEDURE — 99285 EMERGENCY DEPT VISIT HI MDM: CPT

## 2023-01-09 PROCEDURE — 36415 COLL VENOUS BLD VENIPUNCTURE: CPT

## 2023-01-09 PROCEDURE — 74011250637 HC RX REV CODE- 250/637: Performed by: EMERGENCY MEDICINE

## 2023-01-09 PROCEDURE — 80053 COMPREHEN METABOLIC PANEL: CPT

## 2023-01-09 PROCEDURE — 83690 ASSAY OF LIPASE: CPT

## 2023-01-09 PROCEDURE — 85025 COMPLETE CBC W/AUTO DIFF WBC: CPT

## 2023-01-09 PROCEDURE — 96374 THER/PROPH/DIAG INJ IV PUSH: CPT

## 2023-01-09 PROCEDURE — 74011250636 HC RX REV CODE- 250/636: Performed by: EMERGENCY MEDICINE

## 2023-01-09 RX ORDER — ASPIRIN 325 MG
325 TABLET ORAL ONCE
Status: COMPLETED | OUTPATIENT
Start: 2023-01-09 | End: 2023-01-09

## 2023-01-09 RX ORDER — MORPHINE SULFATE 4 MG/ML
4 INJECTION INTRAVENOUS
Status: DISCONTINUED | OUTPATIENT
Start: 2023-01-09 | End: 2023-01-10

## 2023-01-09 RX ORDER — MORPHINE SULFATE 4 MG/ML
4 INJECTION INTRAVENOUS ONCE
Status: COMPLETED | OUTPATIENT
Start: 2023-01-09 | End: 2023-01-09

## 2023-01-09 RX ADMIN — MORPHINE SULFATE 4 MG: 4 INJECTION, SOLUTION INTRAMUSCULAR; INTRAVENOUS at 20:17

## 2023-01-09 RX ADMIN — ASPIRIN 325 MG: 325 TABLET ORAL at 20:17

## 2023-01-09 RX ADMIN — IOPAMIDOL 100 ML: 755 INJECTION, SOLUTION INTRAVENOUS at 21:17

## 2023-01-09 NOTE — Clinical Note
Status[de-identified] INPATIENT [101]   Type of Bed: Medical [8]   Inpatient Hospitalization Certified Necessary for the Following Reasons: 1.  Patient Failed outpatient treatment (further clarification in H&P documentation)   Admitting Diagnosis: Partial small bowel obstruction [facility]) [[id number]]   Admitting Physician: Dinora Riley [[id number]]   Attending Physician: Dinora Riley [[id number]]   Estimated Length of Stay: 3-4 Midnights   Discharge Plan[de-identified] Home with Office Follow-up

## 2023-01-10 PROBLEM — K56.609 SBO (SMALL BOWEL OBSTRUCTION) (HCC): Status: ACTIVE | Noted: 2023-01-10

## 2023-01-10 LAB
ATRIAL RATE: 83 BPM
CALCULATED P AXIS, ECG09: 73 DEGREES
CALCULATED R AXIS, ECG10: -45 DEGREES
CALCULATED T AXIS, ECG11: 102 DEGREES
DIAGNOSIS, 93000: NORMAL
GLUCOSE BLD STRIP.AUTO-MCNC: 110 MG/DL (ref 65–100)
GLUCOSE BLD STRIP.AUTO-MCNC: 84 MG/DL (ref 65–100)
GLUCOSE BLD STRIP.AUTO-MCNC: 98 MG/DL (ref 65–100)
GLUCOSE BLD STRIP.AUTO-MCNC: 99 MG/DL (ref 65–100)
P-R INTERVAL, ECG05: 222 MS
PERFORMED BY, TECHID: ABNORMAL
PERFORMED BY, TECHID: NORMAL
Q-T INTERVAL, ECG07: 380 MS
QRS DURATION, ECG06: 120 MS
QTC CALCULATION (BEZET), ECG08: 446 MS
VENTRICULAR RATE, ECG03: 83 BPM

## 2023-01-10 PROCEDURE — 99222 1ST HOSP IP/OBS MODERATE 55: CPT | Performed by: COLON & RECTAL SURGERY

## 2023-01-10 PROCEDURE — 74011000250 HC RX REV CODE- 250: Performed by: FAMILY MEDICINE

## 2023-01-10 PROCEDURE — 36415 COLL VENOUS BLD VENIPUNCTURE: CPT

## 2023-01-10 PROCEDURE — 94761 N-INVAS EAR/PLS OXIMETRY MLT: CPT

## 2023-01-10 PROCEDURE — 65270000029 HC RM PRIVATE

## 2023-01-10 PROCEDURE — 74011250637 HC RX REV CODE- 250/637: Performed by: FAMILY MEDICINE

## 2023-01-10 PROCEDURE — C9113 INJ PANTOPRAZOLE SODIUM, VIA: HCPCS | Performed by: FAMILY MEDICINE

## 2023-01-10 PROCEDURE — 82962 GLUCOSE BLOOD TEST: CPT

## 2023-01-10 PROCEDURE — 74011250636 HC RX REV CODE- 250/636: Performed by: FAMILY MEDICINE

## 2023-01-10 PROCEDURE — 83036 HEMOGLOBIN GLYCOSYLATED A1C: CPT

## 2023-01-10 RX ORDER — ACETAMINOPHEN 650 MG/1
650 SUPPOSITORY RECTAL
Status: DISCONTINUED | OUTPATIENT
Start: 2023-01-10 | End: 2023-01-16 | Stop reason: HOSPADM

## 2023-01-10 RX ORDER — DEXTROSE MONOHYDRATE AND SODIUM CHLORIDE 5; .45 G/100ML; G/100ML
50 INJECTION, SOLUTION INTRAVENOUS CONTINUOUS
Status: DISCONTINUED | OUTPATIENT
Start: 2023-01-10 | End: 2023-01-16 | Stop reason: HOSPADM

## 2023-01-10 RX ORDER — ATORVASTATIN CALCIUM 40 MG/1
40 TABLET, FILM COATED ORAL
Status: DISCONTINUED | OUTPATIENT
Start: 2023-01-10 | End: 2023-01-16 | Stop reason: HOSPADM

## 2023-01-10 RX ORDER — PANTOPRAZOLE SODIUM 40 MG/1
40 TABLET, DELAYED RELEASE ORAL
Status: DISCONTINUED | OUTPATIENT
Start: 2023-01-10 | End: 2023-01-10

## 2023-01-10 RX ORDER — POLYETHYLENE GLYCOL 3350 17 G/17G
17 POWDER, FOR SOLUTION ORAL DAILY PRN
Status: DISCONTINUED | OUTPATIENT
Start: 2023-01-10 | End: 2023-01-16 | Stop reason: HOSPADM

## 2023-01-10 RX ORDER — MORPHINE SULFATE 2 MG/ML
2 INJECTION, SOLUTION INTRAMUSCULAR; INTRAVENOUS
Status: DISPENSED | OUTPATIENT
Start: 2023-01-10 | End: 2023-01-11

## 2023-01-10 RX ORDER — RAMIPRIL 10 MG/1
10 CAPSULE ORAL DAILY
Status: DISCONTINUED | OUTPATIENT
Start: 2023-01-10 | End: 2023-01-10

## 2023-01-10 RX ORDER — ONDANSETRON 2 MG/ML
4 INJECTION INTRAMUSCULAR; INTRAVENOUS
Status: DISCONTINUED | OUTPATIENT
Start: 2023-01-10 | End: 2023-01-16 | Stop reason: HOSPADM

## 2023-01-10 RX ORDER — HEPARIN SODIUM 5000 [USP'U]/ML
5000 INJECTION, SOLUTION INTRAVENOUS; SUBCUTANEOUS EVERY 8 HOURS
Status: DISCONTINUED | OUTPATIENT
Start: 2023-01-10 | End: 2023-01-16 | Stop reason: HOSPADM

## 2023-01-10 RX ORDER — INSULIN LISPRO 100 [IU]/ML
INJECTION, SOLUTION INTRAVENOUS; SUBCUTANEOUS
Status: DISCONTINUED | OUTPATIENT
Start: 2023-01-10 | End: 2023-01-16 | Stop reason: HOSPADM

## 2023-01-10 RX ORDER — FAMOTIDINE 20 MG/1
40 TABLET, FILM COATED ORAL 2 TIMES DAILY
Status: DISCONTINUED | OUTPATIENT
Start: 2023-01-10 | End: 2023-01-10

## 2023-01-10 RX ORDER — ACETAMINOPHEN 325 MG/1
650 TABLET ORAL
Status: DISCONTINUED | OUTPATIENT
Start: 2023-01-10 | End: 2023-01-15

## 2023-01-10 RX ORDER — ONDANSETRON 4 MG/1
4 TABLET, ORALLY DISINTEGRATING ORAL
Status: DISCONTINUED | OUTPATIENT
Start: 2023-01-10 | End: 2023-01-16 | Stop reason: HOSPADM

## 2023-01-10 RX ORDER — LISINOPRIL 40 MG/1
40 TABLET ORAL DAILY
Status: DISCONTINUED | OUTPATIENT
Start: 2023-01-10 | End: 2023-01-16 | Stop reason: HOSPADM

## 2023-01-10 RX ORDER — DILTIAZEM HYDROCHLORIDE 120 MG/1
120 CAPSULE, COATED, EXTENDED RELEASE ORAL DAILY
Status: DISCONTINUED | OUTPATIENT
Start: 2023-01-10 | End: 2023-01-16 | Stop reason: HOSPADM

## 2023-01-10 RX ORDER — IBUPROFEN 200 MG
4 TABLET ORAL AS NEEDED
Status: DISCONTINUED | OUTPATIENT
Start: 2023-01-10 | End: 2023-01-16 | Stop reason: HOSPADM

## 2023-01-10 RX ADMIN — SODIUM CHLORIDE, PRESERVATIVE FREE 40 MG: 5 INJECTION INTRAVENOUS at 10:38

## 2023-01-10 RX ADMIN — MORPHINE SULFATE 2 MG: 2 INJECTION, SOLUTION INTRAMUSCULAR; INTRAVENOUS at 01:46

## 2023-01-10 RX ADMIN — MORPHINE SULFATE 2 MG: 2 INJECTION, SOLUTION INTRAMUSCULAR; INTRAVENOUS at 17:25

## 2023-01-10 RX ADMIN — DILTIAZEM HYDROCHLORIDE 120 MG: 120 CAPSULE, COATED, EXTENDED RELEASE ORAL at 12:45

## 2023-01-10 RX ADMIN — MORPHINE SULFATE 2 MG: 2 INJECTION, SOLUTION INTRAMUSCULAR; INTRAVENOUS at 10:44

## 2023-01-10 RX ADMIN — MORPHINE SULFATE 2 MG: 2 INJECTION, SOLUTION INTRAMUSCULAR; INTRAVENOUS at 06:02

## 2023-01-10 RX ADMIN — DEXTROSE AND SODIUM CHLORIDE 50 ML/HR: 5; 450 INJECTION, SOLUTION INTRAVENOUS at 10:38

## 2023-01-10 RX ADMIN — MORPHINE SULFATE 2 MG: 2 INJECTION, SOLUTION INTRAMUSCULAR; INTRAVENOUS at 23:39

## 2023-01-10 RX ADMIN — HEPARIN SODIUM 5000 UNITS: 5000 INJECTION INTRAVENOUS; SUBCUTANEOUS at 15:04

## 2023-01-10 RX ADMIN — HEPARIN SODIUM 5000 UNITS: 5000 INJECTION INTRAVENOUS; SUBCUTANEOUS at 22:12

## 2023-01-10 NOTE — PROGRESS NOTES
Responded to page in the Emergency Room. Patient's daughter was present and requested prayer. Patient was in a lot of pain. Patient's daughter shared they are Ruby Dose provided words of care, concern, comfort and prayer. Advised of  availability. Rev.  Zaida Pearce 48, [address]

## 2023-01-10 NOTE — ED TRIAGE NOTES
Per EMS, pt from [facility] for severe abd pain. States her pain feels like its \"ripping\" in her chest. EKG read STEMI in route. [facility]emi alert called.

## 2023-01-10 NOTE — H&P
History and Physical    NAME: Beatrice Nuñez   :  1950   MRN:  [id number]     Date/Time:  1/10/2023 8:38 AM    Patient PCP: Racheal Christensen MD  ______________________________________________________________________             Subjective:     CHIEF COMPLAINT:     Abdominal pain no bowel movement for 3 to 4 days      HISTORY OF PRESENT ILLNESS:       Patient is a 67y.o. year old female with significant past medical history of type 2 diabetes hypertension chronic A. fib hyperlipidemia came to emergency room complaining of abdominal pain for almost 3 days no vomiting for 3 days getting normal worse no nausea no vomiting no fever no chills came to emergency room seen by the ER physician    CT scan of the abdomen done which shows  Imaging findings consistent with early/partial small bowel obstruction,  transition point in the left lower quadrant most likely related to adhesions. Please see above for additional nonemergent incidental findings    Patient was recommend to be admitted for further evaluation treatment    When I saw the patient patient still have complaining a lot of abdominal pain  I ordered NG tube placement  And surgical consult    Past Medical History:   Diagnosis Date    Diabetes ([address]     High cholesterol     Hypertension         Past Surgical History:   Procedure Laterality Date    HX HEMORRHOIDECTOMY      HX TONSILLECTOMY      HX WISDOM TEETH EXTRACTION         Social History     Tobacco Use    Smoking status: Never    Smokeless tobacco: Never   Substance Use Topics    Alcohol use: Never        No family history on file. Allergies   Allergen Reactions    Doxycycline Unknown (comments)     HIVES    Metoprolol Unknown (comments)     HIVES        Prior to Admission medications    Medication Sig Start Date End Date Taking? Authorizing Provider   acetaminophen (TYLENOL) 325 mg tablet Take 2 Tablets by mouth every six (6) hours as needed for Pain.  22   Rani Delgado MD   methocarbamoL (Robaxin-750) 750 mg tablet Take 1 Tablet by mouth three (3) times daily as needed for Muscle Spasm(s). 2/24/22   Miranda Lemos MD   amoxicillin-clavulanate (Augmentin) 875-125 mg per tablet Take 1 Tablet by mouth two (2) times a day. 2/19/22   Fredis Gomez MD   methylPREDNISolone (Medrol, Jeramie,) 4 mg tablet Day 1: 24 mg on day 1 administered as 8 mg (2 tablets) before breakfast, 4 mg (1 tablet) after lunch, 4 mg (1 tablet) after supper, and 8 mg (2 tablets) at bedtime or 24 mg (6 tablets) as a single dose or divided into 2 or 3 doses upon initiation (regardless of time of day). Day 2: 20 mg on day 2 administered as 4 mg (1 tablet) before breakfast, 4 mg (1 tablet) after lunch, 4 mg (1 tablet) after supper, and 8 mg (2 tablets) at bedtime. Day 3: 16 mg on day 3 administered as 4 mg (1 tablet) before breakfast, 4 mg (1 tablet) after lunch, 4 mg (1 tablet) after supper, and 4 mg (1 tablet) at bedtime. Day 4: 12 mg on day 4 administered as 4 mg (1 tablet) before breakfast, 4 mg (1 tablet) after lunch, and 4 mg (1 tablet) at bedtime. Day 5: 8 mg on day 5 administered as 4 mg (1 tablet) before breakfast and 4 mg (1 tablet) at bedtime. Day 6: 4 mg on day 6 administered as 4 mg (1 tablet) before breakfast. 2/19/22   Gely Fonseca MD   dilTIAZem ER (CARDIZEM CD) 120 mg capsule Take 1 Capsule by mouth daily. 10/9/21   Usha Cerna MD   famotidine (PEPCID) 40 mg tablet Take 1 Tablet by mouth two (2) times a day. 10/8/21   Usha Cerna MD   apixaban (Eliquis) 5 mg tablet Eliquis 5 mg tablet   Take 1 tablet twice a day by oral route.     Miky, MD Vicenta   glipiZIDE SR (GLUCOTROL XL) 5 mg CR tablet glipizide ER 5 mg tablet, extended release 24 hr    Vicenta Bolaños MD   amLODIPine (NORVASC) 10 mg tablet amlodipine 10 mg tablet    Other, MD Vicenta   pravastatin (PRAVACHOL) 80 mg tablet pravastatin 80 mg tablet 12/7/20   Other, MD Vicenta   ramipriL (ALTACE) 10 mg capsule  8/14/21   Other, MD Vicenta   chlorthalidone (HYGROTON) 25 mg tablet chlorthalidone 25 mg tablet   TAKE 1 TABLET BY MOUTH EVERY DAY    Vicenta Bolaños MD   gabapentin (NEURONTIN) 100 mg capsule gabapentin 100 mg capsule   TAKE 1 CAPSULE AT BEDTIME THEN SLOWLY INCREASE TO 1 CAPSULE 3 TIMES A DAY    Vicenta Bolaños MD   magnesium oxide (MAG-OX) 400 mg tablet magnesium oxide 400 mg (241.3 mg magnesium) tablet   Take 1 tablet every day by oral route.     Vicenta Bolaños MD   oxybutynin (DITROPAN) 5 mg tablet 1 TABLET AT BEDTIME ORALLY ONCE A DAY 10 DAYS 9/23/21   OtherVicenta MD         Current Facility-Administered Medications:     morphine injection 2 mg, 2 mg, IntraVENous, Q4H PRN, Rodolfo Patel MD, 2 mg at 01/10/23 0602    dextrose 5 % - 0.45% NaCl infusion, 50 mL/hr, IntraVENous, CONTINUOUS, Davian Cerna MD    [facility] [place] by provider] apixaban (ELIQUIS) tablet 5 mg, 5 mg, Oral, BID, Davian Cerna MD    dilTIAZem ER (CARDIZEM CD) capsule 120 mg, 120 mg, Oral, DAILY, Davian Cerna MD    famotidine (PEPCID) tablet 40 mg, 40 mg, Oral, BID, Davian Cerna MD    ramipriL (ALTACE) capsule 10 mg, 10 mg, Oral, DAILY, Davian Cerna MD    insulin lispro (HUMALOG) injection, , SubCUTAneous, AC&HS, Davian Cerna MD    glucose chewable tablet 16 g, 4 Tablet, Oral, PRN, Davian Cerna MD    glucagon (GLUCAGEN) injection 1 mg, 1 mg, IntraMUSCular, PRN, Davian Cerna MD    acetaminophen (TYLENOL) tablet 650 mg, 650 mg, Oral, Q6H PRN **OR** acetaminophen (TYLENOL) suppository 650 mg, 650 mg, Rectal, Q6H PRN, Beverly Cerna MD    polyethylene glycol (MIRALAX) packet 17 g, 17 g, Oral, DAILY PRN, Beverly Cerna MD    ondansetron (ZOFRAN ODT) tablet 4 mg, 4 mg, Oral, Q8H PRN **OR** ondansetron (ZOFRAN) injection 4 mg, 4 mg, IntraVENous, Q6H PRN, Rodolfo Patel MD    heparin (porcine) injection 5,000 Units, 5,000 Units, SubCUTAneous, Q8H, Beverly Cerna MD    rosuvastatin (CRESTOR) tablet 20 mg, 20 mg, Oral, QHS, Davian Cerna MD    pantoprazole (PROTONIX) tablet 40 mg, 40 mg, Oral, ACB, Beverly Cerna MD    Current Outpatient Medications:     acetaminophen (TYLENOL) 325 mg tablet, Take 2 Tablets by mouth every six (6) hours as needed for Pain., Disp: 20 Tablet, Rfl: 0    methocarbamoL (Robaxin-750) 750 mg tablet, Take 1 Tablet by mouth three (3) times daily as needed for Muscle Spasm(s). , Disp: 20 Tablet, Rfl: 0    amoxicillin-clavulanate (Augmentin) 875-125 mg per tablet, Take 1 Tablet by mouth two (2) times a day., Disp: 20 Tablet, Rfl: 0    methylPREDNISolone (Medrol, Jeramie,) 4 mg tablet, Day 1: 24 mg on day 1 administered as 8 mg (2 tablets) before breakfast, 4 mg (1 tablet) after lunch, 4 mg (1 tablet) after supper, and 8 mg (2 tablets) at bedtime or 24 mg (6 tablets) as a single dose or divided into 2 or 3 doses upon initiation (regardless of time of day). Day 2: 20 mg on day 2 administered as 4 mg (1 tablet) before breakfast, 4 mg (1 tablet) after lunch, 4 mg (1 tablet) after supper, and 8 mg (2 tablets) at bedtime. Day 3: 16 mg on day 3 administered as 4 mg (1 tablet) before breakfast, 4 mg (1 tablet) after lunch, 4 mg (1 tablet) after supper, and 4 mg (1 tablet) at bedtime. Day 4: 12 mg on day 4 administered as 4 mg (1 tablet) before breakfast, 4 mg (1 tablet) after lunch, and 4 mg (1 tablet) at bedtime. Day 5: 8 mg on day 5 administered as 4 mg (1 tablet) before breakfast and 4 mg (1 tablet) at bedtime. Day 6: 4 mg on day 6 administered as 4 mg (1 tablet) before breakfast., Disp: 1 Dose Pack, Rfl: 0    dilTIAZem ER (CARDIZEM CD) 120 mg capsule, Take 1 Capsule by mouth daily. , Disp: 30 Capsule, Rfl: 1    famotidine (PEPCID) 40 mg tablet, Take 1 Tablet by mouth two (2) times a day., Disp: 60 Tablet, Rfl: 0    apixaban (Eliquis) 5 mg tablet, Eliquis 5 mg tablet  Take 1 tablet twice a day by oral route., Disp: , Rfl:     glipiZIDE SR (GLUCOTROL XL) 5 mg CR tablet, glipizide ER 5 mg tablet, extended release 24 hr, Disp: , Rfl:     amLODIPine (NORVASC) 10 mg tablet, amlodipine 10 mg tablet, Disp: , Rfl:     pravastatin (PRAVACHOL) 80 mg tablet, pravastatin 80 mg tablet, Disp: , Rfl:     ramipriL (ALTACE) 10 mg capsule, , Disp: , Rfl:     chlorthalidone (HYGROTON) 25 mg tablet, chlorthalidone 25 mg tablet  TAKE 1 TABLET BY MOUTH EVERY DAY, Disp: , Rfl:     gabapentin (NEURONTIN) 100 mg capsule, gabapentin 100 mg capsule  TAKE 1 CAPSULE AT BEDTIME THEN SLOWLY INCREASE TO 1 CAPSULE 3 TIMES A DAY, Disp: , Rfl:     magnesium oxide (MAG-OX) 400 mg tablet, magnesium oxide 400 mg (241.3 mg magnesium) tablet  Take 1 tablet every day by oral route., Disp: , Rfl:     oxybutynin (DITROPAN) 5 mg tablet, 1 TABLET AT BEDTIME ORALLY ONCE A DAY 10 DAYS, Disp: , Rfl:     LAB DATA REVIEWED:    Recent Results (from the past 24 hour(s))   CBC WITH AUTOMATED DIFF    Collection Time: 01/09/23  8:13 PM   Result Value Ref Range    WBC 4.3 3.6 - 11.0 K/uL    RBC 4.39 3.80 - 5.20 M/uL    HGB 12.3 11.5 - 16.0 g/dL    HCT 39.7 35.0 - 47.0 %    MCV 90.4 80.0 - 99.0 FL    MCH 28.0 26.0 - 34.0 PG    MCHC 31.0 30.0 - 36.5 g/dL    RDW 12.9 11.5 - 14.5 %    PLATELET 403 355 - 781 K/uL    MPV 10.8 8.9 - 12.9 FL    NRBC 0.0 0.0  WBC    ABSOLUTE NRBC 0.00 0.00 - 0.01 K/uL    NEUTROPHILS 40 32 - 75 %    LYMPHOCYTES 45 12 - 49 %    MONOCYTES 12 5 - 13 %    EOSINOPHILS 2 0 - 7 %    BASOPHILS 1 0 - 1 %    IMMATURE GRANULOCYTES 0 0 - 0.5 %    ABS. NEUTROPHILS 1.7 (L) 1.8 - 8.0 K/UL    ABS. LYMPHOCYTES 2.0 0.8 - 3.5 K/UL    ABS. MONOCYTES 0.5 0.0 - 1.0 K/UL    ABS. EOSINOPHILS 0.1 0.0 - 0.4 K/UL    ABS. BASOPHILS 0.0 0.0 - 0.1 K/UL    ABS. IMM.  GRANS. 0.0 0.00 - 0.04 K/UL    DF AUTOMATED     METABOLIC PANEL, COMPREHENSIVE    Collection Time: 01/09/23  8:13 PM   Result Value Ref Range    Sodium 142 136 - 145 mmol/L    Potassium 3.8 3.5 - 5.1 mmol/L    Chloride 107 97 - 108 mmol/L    CO2 32 21 - 32 mmol/L    Anion gap 3 (L) 5 - 15 mmol/L    Glucose 92 65 - 100 mg/dL    BUN 19 6 - 20 mg/dL Creatinine 0.63 0.55 - 1.02 mg/dL    BUN/Creatinine ratio 30 (H) 12 - 20      eGFR >60 >60 ml/min/1.73m2    Calcium 10.4 (H) 8.5 - 10.1 mg/dL    Bilirubin, total 0.4 0.2 - 1.0 mg/dL    AST (SGOT) 11 (L) 15 - 37 U/L    ALT (SGPT) 17 12 - 78 U/L    Alk. phosphatase 124 (H) 45 - 117 U/L    Protein, total 7.5 6.4 - 8.2 g/dL    Albumin 3.5 3.5 - 5.0 g/dL    Globulin 4.0 2.0 - 4.0 g/dL    A-G Ratio 0.9 (L) 1.1 - 2.2     LIPASE    Collection Time: 01/09/23  8:13 PM   Result Value Ref Range    Lipase 141 73 - 393 U/L       XR Results (most recent):  Results from [place] encounter on 02/24/22    XR CHEST PORT    Narrative  XR CHEST PORT    Comparison:  2/19/2022. Single view: The lungs are clear. No pneumothorax or pleural effusion apparent. The cardiomediastinum is unremarkable. There is no evidence of acute cardiac  decompensation. Impression  No evidence of an acute cardiopulmonary process. CTA ABD PELV W WO CONT   Final Result   Imaging findings consistent with early/partial small bowel obstruction,   transition point in the left lower quadrant most likely related to adhesions. Please see above for additional nonemergent incidental findings. Review of Systems:  Constitutional: Negative for chills and fever. HENT: Negative. Eyes: Negative. Respiratory: Negative. Cardiovascular: Negative. Gastrointestinal: Abdominal pain  Skin: Negative. Neurological: Negative. Objective:   VITALS:    Visit Vitals  BP (!) 155/93   Pulse 81   Temp 98.1 °F (36.7 °C)   Resp 19   Ht 5' 4\" (1.626 m)   Wt 98.9 kg (218 lb)   SpO2 99%   BMI 37.42 kg/m²       Physical Exam:   Constitutional: pt is oriented to person, place, and time. HENT:   Head: Normocephalic and atraumatic. Eyes: Pupils are equal, round, and reactive to light. EOM are normal.   Cardiovascular: Normal rate, regular rhythm and normal heart sounds. Pulmonary/Chest: Breath sounds normal. No wheezes. No rales. Exhibits no tenderness. Abdominal: S soft tender have some bowel sounds   musculoskeletal: Normal range of motion. Neurological: pt is alert and oriented to person, place, and time. Alert. Normal strength. No cranial nerve deficit or sensory deficit. Displays a negative Romberg sign. ASSESSMENT & PLAN:    Small bowel obstruction  Type 2 diabetes  Hypertension  Chronic A.  Fib  Hyperlipidemia  Neuropathy        Current Facility-Administered Medications:     morphine injection 2 mg, 2 mg, IntraVENous, Q4H PRN, Beverly Cerna MD, 2 mg at 01/10/23 0602    dextrose 5 % - 0.45% NaCl infusion, 50 mL/hr, IntraVENous, CONTINUOUS, Ben Cerna MD    [facility] [place] by provider] apixaban (ELIQUIS) tablet 5 mg, 5 mg, Oral, BID, Ben Cerna MD    dilTIAZem ER (CARDIZEM CD) capsule 120 mg, 120 mg, Oral, DAILY, Ben Cerna MD    famotidine (PEPCID) tablet 40 mg, 40 mg, Oral, BID, Ben Cerna MD    ramipriL (ALTACE) capsule 10 mg, 10 mg, Oral, DAILY, Ben Cerna MD    insulin lispro (HUMALOG) injection, , SubCUTAneous, AC&HS, Ben Cerna MD    glucose chewable tablet 16 g, 4 Tablet, Oral, PRN, Ben Cerna MD    glucagon (GLUCAGEN) injection 1 mg, 1 mg, IntraMUSCular, PRN, Ben Cerna MD    acetaminophen (TYLENOL) tablet 650 mg, 650 mg, Oral, Q6H PRN **OR** acetaminophen (TYLENOL) suppository 650 mg, 650 mg, Rectal, Q6H PRN, Beverly Cerna MD    polyethylene glycol (MIRALAX) packet 17 g, 17 g, Oral, DAILY PRN, Beverly Cerna MD    ondansetron (ZOFRAN ODT) tablet 4 mg, 4 mg, Oral, Q8H PRN **OR** ondansetron (ZOFRAN) injection 4 mg, 4 mg, IntraVENous, Q6H PRN, Beverly Cerna MD    heparin (porcine) injection 5,000 Units, 5,000 Units, SubCUTAneous, Q8H, Beverly Cerna MD    rosuvastatin (CRESTOR) tablet 20 mg, 20 mg, Oral, QHS, Beverly Cerna MD    pantoprazole (PROTONIX) tablet 40 mg, 40 mg, Oral, ACB, Ben Cerna MD    Current Outpatient Medications: acetaminophen (TYLENOL) 325 mg tablet, Take 2 Tablets by mouth every six (6) hours as needed for Pain., Disp: 20 Tablet, Rfl: 0    methocarbamoL (Robaxin-750) 750 mg tablet, Take 1 Tablet by mouth three (3) times daily as needed for Muscle Spasm(s). , Disp: 20 Tablet, Rfl: 0    amoxicillin-clavulanate (Augmentin) 875-125 mg per tablet, Take 1 Tablet by mouth two (2) times a day., Disp: 20 Tablet, Rfl: 0    methylPREDNISolone (Medrol, Jeramie,) 4 mg tablet, Day 1: 24 mg on day 1 administered as 8 mg (2 tablets) before breakfast, 4 mg (1 tablet) after lunch, 4 mg (1 tablet) after supper, and 8 mg (2 tablets) at bedtime or 24 mg (6 tablets) as a single dose or divided into 2 or 3 doses upon initiation (regardless of time of day). Day 2: 20 mg on day 2 administered as 4 mg (1 tablet) before breakfast, 4 mg (1 tablet) after lunch, 4 mg (1 tablet) after supper, and 8 mg (2 tablets) at bedtime. Day 3: 16 mg on day 3 administered as 4 mg (1 tablet) before breakfast, 4 mg (1 tablet) after lunch, 4 mg (1 tablet) after supper, and 4 mg (1 tablet) at bedtime. Day 4: 12 mg on day 4 administered as 4 mg (1 tablet) before breakfast, 4 mg (1 tablet) after lunch, and 4 mg (1 tablet) at bedtime. Day 5: 8 mg on day 5 administered as 4 mg (1 tablet) before breakfast and 4 mg (1 tablet) at bedtime. Day 6: 4 mg on day 6 administered as 4 mg (1 tablet) before breakfast., Disp: 1 Dose Pack, Rfl: 0    dilTIAZem ER (CARDIZEM CD) 120 mg capsule, Take 1 Capsule by mouth daily. , Disp: 30 Capsule, Rfl: 1    famotidine (PEPCID) 40 mg tablet, Take 1 Tablet by mouth two (2) times a day., Disp: 60 Tablet, Rfl: 0    apixaban (Eliquis) 5 mg tablet, Eliquis 5 mg tablet  Take 1 tablet twice a day by oral route., Disp: , Rfl:     glipiZIDE SR (GLUCOTROL XL) 5 mg CR tablet, glipizide ER 5 mg tablet, extended release 24 hr, Disp: , Rfl:     amLODIPine (NORVASC) 10 mg tablet, amlodipine 10 mg tablet, Disp: , Rfl:     pravastatin (PRAVACHOL) 80 mg tablet, pravastatin 80 mg tablet, Disp: , Rfl:     ramipriL (ALTACE) 10 mg capsule, , Disp: , Rfl:     chlorthalidone (HYGROTON) 25 mg tablet, chlorthalidone 25 mg tablet  TAKE 1 TABLET BY MOUTH EVERY DAY, Disp: , Rfl:     gabapentin (NEURONTIN) 100 mg capsule, gabapentin 100 mg capsule  TAKE 1 CAPSULE AT BEDTIME THEN SLOWLY INCREASE TO 1 CAPSULE 3 TIMES A DAY, Disp: , Rfl:     magnesium oxide (MAG-OX) 400 mg tablet, magnesium oxide 400 mg (241.3 mg magnesium) tablet  Take 1 tablet every day by oral route., Disp: , Rfl:     oxybutynin (DITROPAN) 5 mg tablet, 1 TABLET AT BEDTIME ORALLY ONCE A DAY 10 DAYS, Disp: , Rfl:      Admit to medical admitted for n.p.o. NG tube suctioning surgical consult    Home medication reviewed  Hold Eliquis and diabetic medication put on sliding scale    Repeat the labs in the morning discussed with the patient and patient's daughter at the bedside      ________________________________________________________________________    Signed: Davi Arzola MD

## 2023-01-10 NOTE — ED PROVIDER NOTES
EMERGENCY DEPARTMENT HISTORY AND PHYSICAL EXAM      Date: 1/9/2023  Patient Name: Rajan hSepard    History of Presenting Illness     Chief Complaint   Patient presents with    Chest Pain       History Provided By: Patient and EMS    HPI: Rajan Shepard, 67 y.o. female presents to the emergency department complaint of 1 hour history of severe periumbilical abdominal pain. Patient reports pain is sharp and tearing, without noted aggravating relieving factors. EMS reported STEMI in route, alert called. EMS states they have not given aspirin or any other pain medicines. There are no other complaints, changes, or physical findings at this time. Past History     Past Medical History:  Past Medical History:   Diagnosis Date    Diabetes ([facility] [address]     High cholesterol     Hypertension        Past Surgical History:  Past Surgical History:   Procedure Laterality Date    HX HEMORRHOIDECTOMY      HX TONSILLECTOMY      HX WISDOM TEETH EXTRACTION         Family History:  No family history on file. Social History:  Social History     Tobacco Use    Smoking status: Never    Smokeless tobacco: Never   Substance Use Topics    Alcohol use: Never    Drug use: Never       Allergies: Allergies   Allergen Reactions    Doxycycline Unknown (comments)     HIVES    Metoprolol Unknown (comments)     HIVES       PCP: Tom Little MD    No current facility-administered medications on file prior to encounter. Current Outpatient Medications on File Prior to Encounter   Medication Sig Dispense Refill    acetaminophen (TYLENOL) 325 mg tablet Take 2 Tablets by mouth every six (6) hours as needed for Pain. 20 Tablet 0    methocarbamoL (Robaxin-750) 750 mg tablet Take 1 Tablet by mouth three (3) times daily as needed for Muscle Spasm(s). 20 Tablet 0    amoxicillin-clavulanate (Augmentin) 875-125 mg per tablet Take 1 Tablet by mouth two (2) times a day.  20 Tablet 0    methylPREDNISolone (Medrol, Jeramie,) 4 mg tablet Day 1: 24 mg on day 1 administered as 8 mg (2 tablets) before breakfast, 4 mg (1 tablet) after lunch, 4 mg (1 tablet) after supper, and 8 mg (2 tablets) at bedtime or 24 mg (6 tablets) as a single dose or divided into 2 or 3 doses upon initiation (regardless of time of day). Day 2: 20 mg on day 2 administered as 4 mg (1 tablet) before breakfast, 4 mg (1 tablet) after lunch, 4 mg (1 tablet) after supper, and 8 mg (2 tablets) at bedtime. Day 3: 16 mg on day 3 administered as 4 mg (1 tablet) before breakfast, 4 mg (1 tablet) after lunch, 4 mg (1 tablet) after supper, and 4 mg (1 tablet) at bedtime. Day 4: 12 mg on day 4 administered as 4 mg (1 tablet) before breakfast, 4 mg (1 tablet) after lunch, and 4 mg (1 tablet) at bedtime. Day 5: 8 mg on day 5 administered as 4 mg (1 tablet) before breakfast and 4 mg (1 tablet) at bedtime. Day 6: 4 mg on day 6 administered as 4 mg (1 tablet) before breakfast. 1 Dose Pack 0    dilTIAZem ER (CARDIZEM CD) 120 mg capsule Take 1 Capsule by mouth daily. 30 Capsule 1    famotidine (PEPCID) 40 mg tablet Take 1 Tablet by mouth two (2) times a day. 60 Tablet 0    apixaban (Eliquis) 5 mg tablet Eliquis 5 mg tablet   Take 1 tablet twice a day by oral route. glipiZIDE SR (GLUCOTROL XL) 5 mg CR tablet glipizide ER 5 mg tablet, extended release 24 hr      amLODIPine (NORVASC) 10 mg tablet amlodipine 10 mg tablet      pravastatin (PRAVACHOL) 80 mg tablet pravastatin 80 mg tablet      ramipriL (ALTACE) 10 mg capsule       chlorthalidone (HYGROTON) 25 mg tablet chlorthalidone 25 mg tablet   TAKE 1 TABLET BY MOUTH EVERY DAY      gabapentin (NEURONTIN) 100 mg capsule gabapentin 100 mg capsule   TAKE 1 CAPSULE AT BEDTIME THEN SLOWLY INCREASE TO 1 CAPSULE 3 TIMES A DAY      magnesium oxide (MAG-OX) 400 mg tablet magnesium oxide 400 mg (241.3 mg magnesium) tablet   Take 1 tablet every day by oral route.       oxybutynin (DITROPAN) 5 mg tablet 1 TABLET AT BEDTIME ORALLY ONCE A DAY 10 DAYS         Review of Systems Review of Systems  Review of Systems   Constitutional: Negative for chills and fever. HENT: Negative for sinus pressure and sinus pain. Eyes: Negative for photophobia and redness. Respiratory: Negative for shortness of breath and wheezing. Cardiovascular: Negative for chest pain and palpitations. Gastrointestinal: Positive for abdominal pain and negative for nausea. Genitourinary: Negative for flank pain and hematuria. Musculoskeletal: Negative for arthralgias and gait problem. Skin: Negative for color change and pallor. Neurological: Negative for dizziness and weakness. Physical Exam   Physical Exam  Physical Exam  Constitutional:       General: Uncomfortable appearing. Appearance: Normal appearance. Not toxic-appearing. HENT:      Head: Normocephalic and atraumatic. Nose: Nose normal.      Mouth/Throat:      Mouth: Mucous membranes are moist.   Eyes:      Extraocular Movements: Extraocular movements intact. Pupils: Pupils are equal, round, and reactive to light. Cardiovascular:      Rate and Rhythm: Normal rate. Pulses: Normal pulses. Pulmonary:      Effort: Pulmonary effort is normal.      Breath sounds: No stridor, clear to auscultation bilaterally  Abdominal:      General: Abdomen is flat. There is no distension. Tenderness to palpation of the periumbilical region with voluntary guarding without rebound. Musculoskeletal:         General: Normal range of motion. Cervical back: Normal range of motion and neck supple. Skin:     General: Skin is warm and dry. Capillary Refill: Capillary refill takes less than 2 seconds. Neurological:      General: No focal deficit present. Mental Status: Alert and oriented to person, place, and time.    Psychiatric:         Mood and Affect: Mood normal.         Behavior: Behavior normal.     Lab and Diagnostic Study Results   Labs -     Recent Results (from the past 12 hour(s))   CBC WITH AUTOMATED DIFF Collection Time: 01/09/23  8:13 PM   Result Value Ref Range    WBC 4.3 3.6 - 11.0 K/uL    RBC 4.39 3.80 - 5.20 M/uL    HGB 12.3 11.5 - 16.0 g/dL    HCT 39.7 35.0 - 47.0 %    MCV 90.4 80.0 - 99.0 FL    MCH 28.0 26.0 - 34.0 PG    MCHC 31.0 30.0 - 36.5 g/dL    RDW 12.9 11.5 - 14.5 %    PLATELET 020 992 - 780 K/uL    MPV 10.8 8.9 - 12.9 FL    NRBC 0.0 0.0  WBC    ABSOLUTE NRBC 0.00 0.00 - 0.01 K/uL    NEUTROPHILS 40 32 - 75 %    LYMPHOCYTES 45 12 - 49 %    MONOCYTES 12 5 - 13 %    EOSINOPHILS 2 0 - 7 %    BASOPHILS 1 0 - 1 %    IMMATURE GRANULOCYTES 0 0 - 0.5 %    ABS. NEUTROPHILS 1.7 (L) 1.8 - 8.0 K/UL    ABS. LYMPHOCYTES 2.0 0.8 - 3.5 K/UL    ABS. MONOCYTES 0.5 0.0 - 1.0 K/UL    ABS. EOSINOPHILS 0.1 0.0 - 0.4 K/UL    ABS. BASOPHILS 0.0 0.0 - 0.1 K/UL    ABS. IMM. GRANS. 0.0 0.00 - 0.04 K/UL    DF AUTOMATED     METABOLIC PANEL, COMPREHENSIVE    Collection Time: 01/09/23  8:13 PM   Result Value Ref Range    Sodium 142 136 - 145 mmol/L    Potassium 3.8 3.5 - 5.1 mmol/L    Chloride 107 97 - 108 mmol/L    CO2 32 21 - 32 mmol/L    Anion gap 3 (L) 5 - 15 mmol/L    Glucose 92 65 - 100 mg/dL    BUN 19 6 - 20 mg/dL    Creatinine 0.63 0.55 - 1.02 mg/dL    BUN/Creatinine ratio 30 (H) 12 - 20      eGFR >60 >60 ml/min/1.73m2    Calcium 10.4 (H) 8.5 - 10.1 mg/dL    Bilirubin, total 0.4 0.2 - 1.0 mg/dL    AST (SGOT) 11 (L) 15 - 37 U/L    ALT (SGPT) 17 12 - 78 U/L    Alk.  phosphatase 124 (H) 45 - 117 U/L    Protein, total 7.5 6.4 - 8.2 g/dL    Albumin 3.5 3.5 - 5.0 g/dL    Globulin 4.0 2.0 - 4.0 g/dL    A-G Ratio 0.9 (L) 1.1 - 2.2     LIPASE    Collection Time: 01/09/23  8:13 PM   Result Value Ref Range    Lipase 141 73 - 393 U/L       Radiologic Studies -   @lastxrresult@  CT Results  (Last 48 hours)                 01/09/23 2117  CTA ABD PELV W WO CONT Final result    Impression:  Imaging findings consistent with early/partial small bowel obstruction,   transition point in the left lower quadrant most likely related to adhesions. Please see above for additional nonemergent incidental findings. Narrative:  CLINICAL HISTORY: Exquisite periumbilical abdominal pain, rule out AAA   INDICATION: Exquisite periumbilical abdominal pain, rule out AAA   COMPARISON: None. CONTRAST: 100  ml Isovue 370   TECHNIQUE:    Multislice helical CT was performed of the abdomen and pelvis both prior to and   subsequently following uneventful rapid bolus intravenous contrast   administration. Oral contrast was not administered. Contiguous 5 mm axial   images were reconstructed and lung and soft tissue windows were generated. Coronal and sagittal reformations were generated. CT dose reduction was   achieved through use of a standardized protocol tailored for this examination   and automatic exposure control for dose modulation. FINDINGS:   LUNG BASES:There is cardiomegaly. Dependent atelectasis. LIVER: Hepatic steatosis. There is no intrahepatic duct dilatation. There is no   hepatic parenchymal mass. Hepatic enhancement pattern is within normal limits. Portal vein is patent. GALLBLADDER:  No dilatation or wall thickening. SPLEEN/PANCREAS: There is mild pancreatic duct dilatation. There is no   pancreatic duct dilatation. There is no evidence of splenomegaly. ADRENALS/KIDNEYS: Right renal cortical scarring. There is no hydronephrosis. There is no renal mass. There is no perinephric mass. STOMACH: Gastric distention is mild to moderate    COLON AND SMALL BOWEL: There is small bowel distention with transition point in   the left lower quadrant. There is no free intraperitoneal air. There is no   evidence of incarceration. There is fecal stasis which is moderate to severe No   mesenteric adenopathy. PERITONEUM: Unremarkable. APPENDIX: Unremarkable. BLADDER/REPRODUCTIVE ORGANS: Bladder is mildly distended. RETROPERITONEUM: Unremarkable. The abdominal aorta is normal in caliber. No   aneurysm.  No retroperitoneal adenopathy. OSSEOUS STRUCTURES: Degenerative changes with vacuum disc phenomena at L4-5 and   T12-L1. CXR Results  (Last 48 hours)      None            Medical Decision Making and ED Course   Differential Diagnosis & Medical Decision Making Provider Note:   EKG performed in the ER indicates no STEMI, STEMI code canceled. Patient's pain appears to be more in the epigastrium. She states she does have a history of A. fib for which she takes Eliquis, though given her exquisite pain I am still concerned for either mesenteric ischemia versus AAA or other acute complicated finding.    - I am the first provider for this patient. I reviewed the vital signs, available nursing notes, past medical history, past surgical history, family history and social history. The patients presenting problems have been discussed, and they are in agreement with the care plan formulated and outlined with them. I have encouraged them to ask questions as they arise throughout their visit. Vital Signs-Reviewed the patient's vital signs. Patient Vitals for the past 12 hrs:   Temp Pulse Resp BP SpO2   01/09/23 2145 -- 86 16 (!) 161/80 100 %   01/09/23 2008 98.1 °F (36.7 °C) 86 18 (!) 149/67 98 %           Disposition   Disposition: Admitted to Floor Medical Floor the case was discussed with the admitting physician Dr. Jose D Jackson at 10:20 PM        Diagnosis/Clinical Impression     Clinical Impression:   1. Small bowel obstruction [facility])        Attestations: IPonce MD, am the primary clinician of record. Please note that this dictation was completed with [employer], the computer voice recognition software. Quite often unanticipated grammatical, syntax, homophones, and other interpretive errors are inadvertently transcribed by the computer software. Please disregard these errors. Please excuse any errors that have escaped final proofreading. Thank you.

## 2023-01-10 NOTE — PROGRESS NOTES
Reason for Admission:  SBO                     RUR Score:   6%                  Plan for utilizing home health:    None @ this itme/uses cane. PCP: First and Last name:  Jyoti Payne MD     Name of Practice:    Are you a current patient: Yes/No: Yes   Approximate date of last visit: Seen 4 mos ago. Can you participate in a virtual visit with your PCP: Yes/Call                    Current Advanced Directive/Advance Care Plan: Full Code      Healthcare Decision Maker:              Primary Decision Maker: MarleniSally - Daughter - [phone]                  Transition of Care Plan:                    D/C Plan is home with daughter - daughter to transport. Send Rxs to [facility] in [facility], @ [place] upon discharge.

## 2023-01-10 NOTE — ACP (ADVANCE CARE PLANNING)
Advance Care Planning   Healthcare Decision Maker:       Primary Decision Maker: Sally Yepez - [facility] - [phone]

## 2023-01-10 NOTE — CONSULTS
Consult Date: 1/10/2023    IP CONSULT TO GENERAL SURGERY  Consult performed by: Ruby العراقي MD  Consult ordered by: Stephanie Peraza MD        Subjective   The patient is a very pleasant 67-year-old female with a past medical history significant diabetes, hypertension, hypercholesterolemia, atrial fibrillation who presented to the emergency department yesterday evening with complaints of significant periumbilical abdominal pain. She also states that she had nausea however denies vomiting. She states the pain was sharp and stabbing. There is a question of a NSTEMI called in route by EMS however troponins have been negative. On arrival here she had blood work done, her CBC was normal with no leukocytosis and normal differential.  Her CMP was significant for very minimal hypercalcemia with a calcium of 10.4. Mild elevation in alkaline phosphatase. Shows CT scan of the abdomen pelvis which demonstrated small bowel distention with a transition point in the left lower quadrant thought to be consistent with adhesive disease. There is also fairly significant constipation with fecal stasis in the colon. At time my examination is when the patient received some pain medication and said that her abdominal pain improved. She does have a nasogastric tube in place which is draining. .In addition to her medical history which is listed above the patient has also had a hysterectomy through a lower midline incision as well as an umbilical hernia repair. Past Medical History:   Diagnosis Date    Diabetes ([address]     High cholesterol     Hypertension       Past Surgical History:   Procedure Laterality Date    HX HEMORRHOIDECTOMY      HX TONSILLECTOMY      HX WISDOM TEETH EXTRACTION       No family history on file.    Social History     Tobacco Use    Smoking status: Never    Smokeless tobacco: Never   Substance Use Topics    Alcohol use: Never       Current Facility-Administered Medications   Medication Dose Route Frequency Provider Last Rate Last Admin    morphine injection 2 mg  2 mg IntraVENous Q4H PRN Viviana Ulrich MD   2 mg at 01/10/23 0602    dextrose 5 % - 0.45% NaCl infusion  50 mL/hr IntraVENous CONTINUOUS Kate Cerna MD        [facility] [place] by provider] apixaban (ELIQUIS) tablet 5 mg  5 mg Oral BID Viviana Ulrich MD        dilTIAZem ER (CARDIZEM CD) capsule 120 mg  120 mg Oral DAILY Viviana Ulrich MD        famotidine (PEPCID) tablet 40 mg  40 mg Oral BID Viviana Ulrich MD        ramipriL (ALTACE) capsule 10 mg  10 mg Oral DAILY Viviana Ulrich MD        insulin lispro (HUMALOG) injection   SubCUTAneous AC&HS Viviana Ulrich MD        glucose chewable tablet 16 g  4 Tablet Oral PRN Viviana Ulrich MD        glucagon (GLUCAGEN) injection 1 mg  1 mg IntraMUSCular PRN Viviana Ulrich MD        acetaminophen (TYLENOL) tablet 650 mg  650 mg Oral Q6H PRN Kate Cerna MD        Or    acetaminophen (TYLENOL) suppository 650 mg  650 mg Rectal Q6H PRN Viviana Ulrich MD        polyethylene glycol (MIRALAX) packet 17 g  17 g Oral DAILY PRN Viviana Ulrich MD        ondansetron (ZOFRAN ODT) tablet 4 mg  4 mg Oral Q8H PRN Viviana Ulrich MD        Or    ondansetron (ZOFRAN) injection 4 mg  4 mg IntraVENous Q6H PRN Viviana Ulrich MD        heparin (porcine) injection 5,000 Units  5,000 Units SubCUTAneous Q8H Viviana Ulrich MD        rosuvastatin (CRESTOR) tablet 20 mg  20 mg Oral QHS Beverly Cerna MD        pantoprazole (PROTONIX) tablet 40 mg  40 mg Oral ACB Beverly Cerna MD         Current Outpatient Medications   Medication Sig Dispense Refill    acetaminophen (TYLENOL) 325 mg tablet Take 2 Tablets by mouth every six (6) hours as needed for Pain. 20 Tablet 0    methocarbamoL (Robaxin-750) 750 mg tablet Take 1 Tablet by mouth three (3) times daily as needed for Muscle Spasm(s).  20 Tablet 0    amoxicillin-clavulanate (Augmentin) 875-125 mg per tablet Take 1 Tablet by mouth two (2) times a day. 20 Tablet 0    methylPREDNISolone (Medrol, Jeramie,) 4 mg tablet Day 1: 24 mg on day 1 administered as 8 mg (2 tablets) before breakfast, 4 mg (1 tablet) after lunch, 4 mg (1 tablet) after supper, and 8 mg (2 tablets) at bedtime or 24 mg (6 tablets) as a single dose or divided into 2 or 3 doses upon initiation (regardless of time of day). Day 2: 20 mg on day 2 administered as 4 mg (1 tablet) before breakfast, 4 mg (1 tablet) after lunch, 4 mg (1 tablet) after supper, and 8 mg (2 tablets) at bedtime. Day 3: 16 mg on day 3 administered as 4 mg (1 tablet) before breakfast, 4 mg (1 tablet) after lunch, 4 mg (1 tablet) after supper, and 4 mg (1 tablet) at bedtime. Day 4: 12 mg on day 4 administered as 4 mg (1 tablet) before breakfast, 4 mg (1 tablet) after lunch, and 4 mg (1 tablet) at bedtime. Day 5: 8 mg on day 5 administered as 4 mg (1 tablet) before breakfast and 4 mg (1 tablet) at bedtime. Day 6: 4 mg on day 6 administered as 4 mg (1 tablet) before breakfast. 1 Dose Pack 0    dilTIAZem ER (CARDIZEM CD) 120 mg capsule Take 1 Capsule by mouth daily. 30 Capsule 1    famotidine (PEPCID) 40 mg tablet Take 1 Tablet by mouth two (2) times a day. 60 Tablet 0    apixaban (Eliquis) 5 mg tablet Eliquis 5 mg tablet   Take 1 tablet twice a day by oral route. glipiZIDE SR (GLUCOTROL XL) 5 mg CR tablet glipizide ER 5 mg tablet, extended release 24 hr      amLODIPine (NORVASC) 10 mg tablet amlodipine 10 mg tablet      pravastatin (PRAVACHOL) 80 mg tablet pravastatin 80 mg tablet      ramipriL (ALTACE) 10 mg capsule       chlorthalidone (HYGROTON) 25 mg tablet chlorthalidone 25 mg tablet   TAKE 1 TABLET BY MOUTH EVERY DAY      gabapentin (NEURONTIN) 100 mg capsule gabapentin 100 mg capsule   TAKE 1 CAPSULE AT BEDTIME THEN SLOWLY INCREASE TO 1 CAPSULE 3 TIMES A DAY      magnesium oxide (MAG-OX) 400 mg tablet magnesium oxide 400 mg (241.3 mg magnesium) tablet   Take 1 tablet every day by oral route.       oxybutynin (DITROPAN) 5 mg tablet 1 TABLET AT BEDTIME ORALLY ONCE A DAY 10 DAYS          Review of Systems   All other systems reviewed and are negative. Objective     Vital signs for last 24 hours:  Visit Vitals  BP (!) 167/85   Pulse 89   Temp 98.1 °F (36.7 °C)   Resp 21   Ht 5' 4\" (1.626 m)   Wt 98.9 kg (218 lb)   SpO2 97%   BMI 37.42 kg/m²       Intake/Output this shift:  Current Shift: No intake/output data recorded. Last 3 Shifts: No intake/output data recorded. Data Review:   Recent Results (from the past 24 hour(s))   CBC WITH AUTOMATED DIFF    Collection Time: 01/09/23  8:13 PM   Result Value Ref Range    WBC 4.3 3.6 - 11.0 K/uL    RBC 4.39 3.80 - 5.20 M/uL    HGB 12.3 11.5 - 16.0 g/dL    HCT 39.7 35.0 - 47.0 %    MCV 90.4 80.0 - 99.0 FL    MCH 28.0 26.0 - 34.0 PG    MCHC 31.0 30.0 - 36.5 g/dL    RDW 12.9 11.5 - 14.5 %    PLATELET 875 597 - 832 K/uL    MPV 10.8 8.9 - 12.9 FL    NRBC 0.0 0.0  WBC    ABSOLUTE NRBC 0.00 0.00 - 0.01 K/uL    NEUTROPHILS 40 32 - 75 %    LYMPHOCYTES 45 12 - 49 %    MONOCYTES 12 5 - 13 %    EOSINOPHILS 2 0 - 7 %    BASOPHILS 1 0 - 1 %    IMMATURE GRANULOCYTES 0 0 - 0.5 %    ABS. NEUTROPHILS 1.7 (L) 1.8 - 8.0 K/UL    ABS. LYMPHOCYTES 2.0 0.8 - 3.5 K/UL    ABS. MONOCYTES 0.5 0.0 - 1.0 K/UL    ABS. EOSINOPHILS 0.1 0.0 - 0.4 K/UL    ABS. BASOPHILS 0.0 0.0 - 0.1 K/UL    ABS. IMM. GRANS. 0.0 0.00 - 0.04 K/UL    DF AUTOMATED     METABOLIC PANEL, COMPREHENSIVE    Collection Time: 01/09/23  8:13 PM   Result Value Ref Range    Sodium 142 136 - 145 mmol/L    Potassium 3.8 3.5 - 5.1 mmol/L    Chloride 107 97 - 108 mmol/L    CO2 32 21 - 32 mmol/L    Anion gap 3 (L) 5 - 15 mmol/L    Glucose 92 65 - 100 mg/dL    BUN 19 6 - 20 mg/dL    Creatinine 0.63 0.55 - 1.02 mg/dL    BUN/Creatinine ratio 30 (H) 12 - 20      eGFR >60 >60 ml/min/1.73m2    Calcium 10.4 (H) 8.5 - 10.1 mg/dL    Bilirubin, total 0.4 0.2 - 1.0 mg/dL    AST (SGOT) 11 (L) 15 - 37 U/L    ALT (SGPT) 17 12 - 78 U/L    Alk.  phosphatase 124 (H) 45 - 117 U/L    Protein, total 7.5 6.4 - 8.2 g/dL    Albumin 3.5 3.5 - 5.0 g/dL    Globulin 4.0 2.0 - 4.0 g/dL    A-G Ratio 0.9 (L) 1.1 - 2.2     LIPASE    Collection Time: 01/09/23  8:13 PM   Result Value Ref Range    Lipase 141 73 - 393 U/L   GLUCOSE, POC    Collection Time: 01/10/23  8:39 AM   Result Value Ref Range    Glucose (POC) 84 65 - 100 mg/dL    Performed by Abbie Leahy        Physical Exam  Constitutional:       General: She is not in acute distress. Appearance: Normal appearance. She is not ill-appearing. HENT:      Head: Normocephalic and atraumatic. Cardiovascular:      Rate and Rhythm: Normal rate. Pulmonary:      Effort: Pulmonary effort is normal.   Abdominal:      General: There is distension. Palpations: Abdomen is soft. There is no mass. Tenderness: There is abdominal tenderness (Mildly tender palpation diffusely left greater than right abdomen). Hernia: No hernia is present. Comments: Well-healed lower midline incision   Musculoskeletal:         General: Normal range of motion. Cervical back: Normal range of motion and neck supple. Skin:     General: Skin is warm and dry. Neurological:      General: No focal deficit present. Mental Status: She is alert and oriented to person, place, and time. Psychiatric:         Mood and Affect: Mood normal.         Thought Content: Thought content normal.         Judgment: Judgment normal.       Assessment and plan:  76-year-old female sudden onset abdominal pain on CT found to have a partial small bowel obstruction with transition point in the left abdomen consistent with adhesive disease. She also has fairly significant fecal loading and constipation. Patient's labs are all within normal of note she has no leukocytosis. Recommend admission, n.p.o./NG tube, abdomen KUB in a.m. as well as repeat labs in AM.  We will hold Eliquis for now.   Had an extensive discussion with the patient and her family member was with her I did tell her that her obstruction is likely secondary to adhesions as the majority of these cleared up with conservative management.   I will follow the patient closely while in hospital.

## 2023-01-10 NOTE — ED NOTES
TRANSFER - OUT REPORT:    Verbal report given to Devonte(name) on Mady Colin  being transferred to (unit) for routine progression of care       Report consisted of patients Situation, Background, Assessment and   Recommendations(SBAR). Information from the following report(s) SBAR, ED Summary, and MAR was reviewed with the receiving nurse. Lines:   Peripheral IV 01/09/23 Anterior;Proximal;Right Forearm (Active)       Peripheral IV 01/09/23 Anterior;Left;Proximal Forearm (Active)        Opportunity for questions and clarification was provided.       Patient transported with:   [employer]

## 2023-01-10 NOTE — PROGRESS NOTES
Problem: Patient Education: Go to Patient Education Activity  Goal: Patient/Family Education  Outcome: Progressing Towards Goal     Problem: Falls - Risk of  Goal: *Absence of Falls  Description: Document Eloy Graft Fall Risk and appropriate interventions in the flowsheet.   Outcome: Progressing Towards Goal  Note: Fall Risk Interventions:                                Problem: Pain  Goal: *Control of Pain  Outcome: Progressing Towards Goal  Goal: *PALLIATIVE CARE:  Alleviation of Pain  Outcome: Progressing Towards Goal

## 2023-01-11 ENCOUNTER — APPOINTMENT (OUTPATIENT)
Dept: GENERAL RADIOLOGY | Age: 73
DRG: 389 | End: 2023-01-11
Attending: COLON & RECTAL SURGERY
Payer: MEDICARE

## 2023-01-11 LAB
ALBUMIN SERPL-MCNC: 3.4 G/DL (ref 3.5–5)
ALBUMIN/GLOB SERPL: 0.8 (ref 1.1–2.2)
ALP SERPL-CCNC: 108 U/L (ref 45–117)
ALT SERPL-CCNC: 18 U/L (ref 12–78)
ANION GAP SERPL CALC-SCNC: 7 MMOL/L (ref 5–15)
AST SERPL W P-5'-P-CCNC: 13 U/L (ref 15–37)
BILIRUB SERPL-MCNC: 0.5 MG/DL (ref 0.2–1)
BUN SERPL-MCNC: 14 MG/DL (ref 6–20)
BUN/CREAT SERPL: 25 (ref 12–20)
CA-I BLD-MCNC: 10.4 MG/DL (ref 8.5–10.1)
CHLORIDE SERPL-SCNC: 105 MMOL/L (ref 97–108)
CO2 SERPL-SCNC: 26 MMOL/L (ref 21–32)
CREAT SERPL-MCNC: 0.55 MG/DL (ref 0.55–1.02)
EST. AVERAGE GLUCOSE BLD GHB EST-MCNC: 123 MG/DL
GLOBULIN SER CALC-MCNC: 4.2 G/DL (ref 2–4)
GLUCOSE BLD STRIP.AUTO-MCNC: 107 MG/DL (ref 65–100)
GLUCOSE BLD STRIP.AUTO-MCNC: 119 MG/DL (ref 65–100)
GLUCOSE BLD STRIP.AUTO-MCNC: 122 MG/DL (ref 65–100)
GLUCOSE BLD STRIP.AUTO-MCNC: 139 MG/DL (ref 65–100)
GLUCOSE SERPL-MCNC: 118 MG/DL (ref 65–100)
HBA1C MFR BLD: 5.9 % (ref 4–5.6)
PERFORMED BY, TECHID: ABNORMAL
POTASSIUM SERPL-SCNC: 3.7 MMOL/L (ref 3.5–5.1)
PROT SERPL-MCNC: 7.6 G/DL (ref 6.4–8.2)
SODIUM SERPL-SCNC: 138 MMOL/L (ref 136–145)

## 2023-01-11 PROCEDURE — C9113 INJ PANTOPRAZOLE SODIUM, VIA: HCPCS | Performed by: FAMILY MEDICINE

## 2023-01-11 PROCEDURE — 36415 COLL VENOUS BLD VENIPUNCTURE: CPT

## 2023-01-11 PROCEDURE — 74011000250 HC RX REV CODE- 250: Performed by: FAMILY MEDICINE

## 2023-01-11 PROCEDURE — 74011250637 HC RX REV CODE- 250/637: Performed by: FAMILY MEDICINE

## 2023-01-11 PROCEDURE — 80053 COMPREHEN METABOLIC PANEL: CPT

## 2023-01-11 PROCEDURE — 74011250636 HC RX REV CODE- 250/636: Performed by: FAMILY MEDICINE

## 2023-01-11 PROCEDURE — 65270000029 HC RM PRIVATE

## 2023-01-11 PROCEDURE — 74011250636 HC RX REV CODE- 250/636: Performed by: INTERNAL MEDICINE

## 2023-01-11 PROCEDURE — 74018 RADEX ABDOMEN 1 VIEW: CPT

## 2023-01-11 PROCEDURE — 99232 SBSQ HOSP IP/OBS MODERATE 35: CPT | Performed by: COLON & RECTAL SURGERY

## 2023-01-11 PROCEDURE — 82962 GLUCOSE BLOOD TEST: CPT

## 2023-01-11 RX ORDER — MORPHINE SULFATE 2 MG/ML
2 INJECTION, SOLUTION INTRAMUSCULAR; INTRAVENOUS
Status: DISPENSED | OUTPATIENT
Start: 2023-01-11 | End: 2023-01-13

## 2023-01-11 RX ADMIN — DEXTROSE AND SODIUM CHLORIDE 50 ML/HR: 5; 450 INJECTION, SOLUTION INTRAVENOUS at 21:54

## 2023-01-11 RX ADMIN — HEPARIN SODIUM 5000 UNITS: 5000 INJECTION INTRAVENOUS; SUBCUTANEOUS at 05:36

## 2023-01-11 RX ADMIN — HEPARIN SODIUM 5000 UNITS: 5000 INJECTION INTRAVENOUS; SUBCUTANEOUS at 15:42

## 2023-01-11 RX ADMIN — DILTIAZEM HYDROCHLORIDE 120 MG: 120 CAPSULE, COATED, EXTENDED RELEASE ORAL at 10:39

## 2023-01-11 RX ADMIN — SODIUM CHLORIDE, PRESERVATIVE FREE 40 MG: 5 INJECTION INTRAVENOUS at 09:44

## 2023-01-11 RX ADMIN — MORPHINE SULFATE 2 MG: 2 INJECTION, SOLUTION INTRAMUSCULAR; INTRAVENOUS at 20:16

## 2023-01-11 RX ADMIN — HEPARIN SODIUM 5000 UNITS: 5000 INJECTION INTRAVENOUS; SUBCUTANEOUS at 21:44

## 2023-01-11 RX ADMIN — MORPHINE SULFATE 2 MG: 2 INJECTION, SOLUTION INTRAMUSCULAR; INTRAVENOUS at 09:41

## 2023-01-11 RX ADMIN — LISINOPRIL 40 MG: 40 TABLET ORAL at 10:39

## 2023-01-11 NOTE — MED STUDENT NOTES
General Daily Progress Note          Patient Name:   Kain Scruggs       YOB: 1950       Age:  67 y.o. Admit Date: 1/9/2023      Subjective:     Patient is a 67y.o. year old female with significant past medical history of type 2 diabetes hypertension chronic A. fib hyperlipidemia came to emergency room complaining of abdominal pain for almost 3 days no vomiting for 3 days getting normal worse no nausea no vomiting no fever no chills came to emergency room seen by the ER physician     CT scan of the abdomen done which shows  Imaging findings consistent with early/partial small bowel obstruction,  transition point in the left lower quadrant most likely related to adhesions. Please see above for additional nonemergent incidental findings     Patient was recommend to be admitted for further evaluation treatment     When I saw the patient patient still have complaining a lot of abdominal pain  I ordered NG tube placement  And surgical consult     1/11  Patient seen and examined in room today. She continues to complain of mild abdominal pain. She also has NG tube in place. Denies bowel movement. Abdominal KUB done this morning shows large volume stool in the colon.       Objective:     Visit Vitals  BP (!) 163/73 (BP 1 Location: Right upper arm, BP Patient Position: Semi fowlers)   Pulse 95   Temp 99.6 °F (37.6 °C)   Resp 20   Ht 5' 4\" (1.626 m)   Wt 98.9 kg (218 lb)   SpO2 90%   Breastfeeding No   BMI 37.42 kg/m²        Recent Results (from the past 24 hour(s))   GLUCOSE, POC    Collection Time: 01/10/23  4:42 PM   Result Value Ref Range    Glucose (POC) 99 65 - 100 mg/dL    Performed by Jennifer Kidney    GLUCOSE, POC    Collection Time: 01/10/23  8:03 PM   Result Value Ref Range    Glucose (POC) 110 (H) 65 - 100 mg/dL    Performed by Guicho REED    METABOLIC PANEL, COMPREHENSIVE    Collection Time: 01/11/23  8:53 AM   Result Value Ref Range    Sodium 138 136 - 145 mmol/L    Potassium 3.7 3.5 - 5.1 mmol/L    Chloride 105 97 - 108 mmol/L    CO2 26 21 - 32 mmol/L    Anion gap 7 5 - 15 mmol/L    Glucose 118 (H) 65 - 100 mg/dL    BUN 14 6 - 20 mg/dL    Creatinine 0.55 0.55 - 1.02 mg/dL    BUN/Creatinine ratio 25 (H) 12 - 20      eGFR >60 >60 ml/min/1.73m2    Calcium 10.4 (H) 8.5 - 10.1 mg/dL    Bilirubin, total 0.5 0.2 - 1.0 mg/dL    AST (SGOT) 13 (L) 15 - 37 U/L    ALT (SGPT) 18 12 - 78 U/L    Alk. phosphatase 108 45 - 117 U/L    Protein, total 7.6 6.4 - 8.2 g/dL    Albumin 3.4 (L) 3.5 - 5.0 g/dL    Globulin 4.2 (H) 2.0 - 4.0 g/dL    A-G Ratio 0.8 (L) 1.1 - 2.2     GLUCOSE, POC    Collection Time: 01/11/23  9:49 AM   Result Value Ref Range    Glucose (POC) 107 (H) 65 - 100 mg/dL    Performed by [id number], POC    Collection Time: 01/11/23 12:42 PM   Result Value Ref Range    Glucose (POC) 139 (H) 65 - 100 mg/dL    Performed by Chad Rodas      [unfilled]      Review of Systems    Constitutional: Negative for chills and fever. HENT: Negative. Eyes: Negative. Respiratory: Negative. Cardiovascular: Negative. Gastrointestinal: Negative for abdominal pain and nausea. Skin: Negative. Neurological: Negative. Physical Exam:      Constitutional: pt is oriented to person, place, and time. HENT:   Head: Normocephalic and atraumatic. Eyes: Pupils are equal, round, and reactive to light. EOM are normal.   Cardiovascular: Normal rate, regular rhythm and normal heart sounds. Pulmonary/Chest: Breath sounds normal. No wheezes. No rales. Exhibits no tenderness. Abdominal: Soft. Bowel sounds are normal. There is no abdominal tenderness. There is no rebound and no guarding. Musculoskeletal: Normal range of motion. Neurological: pt is alert and oriented to person, place, and time. XR ABD (KUB)   Final Result   Nasogastric tube over the stomach. Large amount colonic stool. No definite   change allowing for technical differences compared to CT.          CTA ABD PELV W WO CONT   Final Result   Imaging findings consistent with early/partial small bowel obstruction,   transition point in the left lower quadrant most likely related to adhesions. Please see above for additional nonemergent incidental findings. Recent Results (from the past 24 hour(s))   GLUCOSE, POC    Collection Time: 01/10/23  4:42 PM   Result Value Ref Range    Glucose (POC) 99 65 - 100 mg/dL    Performed by Cheryl Posada    GLUCOSE, POC    Collection Time: 01/10/23  8:03 PM   Result Value Ref Range    Glucose (POC) 110 (H) 65 - 100 mg/dL    Performed by Mark Moody    METABOLIC PANEL, COMPREHENSIVE    Collection Time: 01/11/23  8:53 AM   Result Value Ref Range    Sodium 138 136 - 145 mmol/L    Potassium 3.7 3.5 - 5.1 mmol/L    Chloride 105 97 - 108 mmol/L    CO2 26 21 - 32 mmol/L    Anion gap 7 5 - 15 mmol/L    Glucose 118 (H) 65 - 100 mg/dL    BUN 14 6 - 20 mg/dL    Creatinine 0.55 0.55 - 1.02 mg/dL    BUN/Creatinine ratio 25 (H) 12 - 20      eGFR >60 >60 ml/min/1.73m2    Calcium 10.4 (H) 8.5 - 10.1 mg/dL    Bilirubin, total 0.5 0.2 - 1.0 mg/dL    AST (SGOT) 13 (L) 15 - 37 U/L    ALT (SGPT) 18 12 - 78 U/L    Alk. phosphatase 108 45 - 117 U/L    Protein, total 7.6 6.4 - 8.2 g/dL    Albumin 3.4 (L) 3.5 - 5.0 g/dL    Globulin 4.2 (H) 2.0 - 4.0 g/dL    A-G Ratio 0.8 (L) 1.1 - 2.2     GLUCOSE, POC    Collection Time: 01/11/23  9:49 AM   Result Value Ref Range    Glucose (POC) 107 (H) 65 - 100 mg/dL    Performed by [address], POC    Collection Time: 01/11/23 12:42 PM   Result Value Ref Range    Glucose (POC) 139 (H) 65 - 100 mg/dL    Performed by Zachary Mcneal        Results       ** No results found for the last 336 hours.  **             Labs:     Recent Labs     01/09/23 2013   WBC 4.3   HGB 12.3   HCT 39.7          Recent Labs     01/11/23  0853 01/09/23 2013    142   K 3.7 3.8    107   CO2 26 32   BUN 14 19   CREA 0.55 0.63   * 92   CA 10.4* 10.4*       Recent Labs     01/11/23  0853 01/09/23 2013   ALT 18 17    124*   TBILI 0.5 0.4   TP 7.6 7.5   ALB 3.4* 3.5   GLOB 4.2* 4.0   LPSE  --  141       No results for input(s): INR, PTP, APTT, INREXT, INREXT in the last 72 hours. No results for input(s): FE, TIBC, PSAT, FERR in the last 72 hours. No results found for: FOL, RBCF   No results for input(s): PH, PCO2, PO2 in the last 72 hours. No results for input(s): CPK, CKNDX, TROIQ in the last 72 hours. No lab exists for component: CPKMB  No results found for: CHOL, CHOLX, CHLST, CHOLV, HDL, HDLP, LDL, LDLC, DLDLP, TGLX, TRIGL, TRIGP, CHHD, CHHDX  Lab Results   Component Value Date/Time    Glucose (POC) 139 (H) 01/11/2023 12:42 PM    Glucose (POC) 107 (H) 01/11/2023 09:49 AM    Glucose (POC) 110 (H) 01/10/2023 08:03 PM    Glucose (POC) 99 01/10/2023 04:42 PM    Glucose (POC) 98 01/10/2023 11:46 AM     Lab Results   Component Value Date/Time    Color Yellow/Straw 10/05/2021 11:25 AM    Appearance Clear 10/05/2021 11:25 AM    Specific gravity 1.014 10/05/2021 11:25 AM    pH (UA) 7.0 10/05/2021 11:25 AM    Protein Negative 10/05/2021 11:25 AM    Glucose Negative 10/05/2021 11:25 AM    Ketone Negative 10/05/2021 11:25 AM    Bilirubin Negative 10/05/2021 11:25 AM    Urobilinogen 0.1 10/05/2021 11:25 AM    Nitrites Negative 10/05/2021 11:25 AM    Leukocyte Esterase Negative 10/05/2021 11:25 AM    Bacteria Negative 10/05/2021 11:25 AM    WBC 0-4 10/05/2021 11:25 AM    RBC 0-5 10/05/2021 11:25 AM         Assessment:     Small bowel obstruction  Type 2 diabetes  Hypertension  Chronic A.  Fib  Hyperlipidemia  Neuropathy    Plan:   Eliquis held by GI  Continue n.p.o/NG tube  Continue to monitor labs  Per surgery if things do not improve over next 24-36 hours will do surgical exploration    Orders soapsuds start anemia          Current Facility-Administered Medications:     dextrose 5 % - 0.45% NaCl infusion, 50 mL/hr, IntraVENous, CONTINUOUS, Kaitlin Saunders MD, Last Rate: 50 mL/hr at 01/10/23 1038, 50 mL/hr at 01/10/23 1038    [Held by provider] apixaban (ELIQUIS) tablet 5 mg, 5 mg, Oral, BID, Isai Cerna MD    dilTIAZem ER (CARDIZEM CD) capsule 120 mg, 120 mg, Oral, DAILY, Beverly Cerna MD, 120 mg at 01/11/23 1039    insulin lispro (HUMALOG) injection, , SubCUTAneous, AC&HS, Isai Cerna MD    glucose chewable tablet 16 g, 4 Tablet, Oral, PRN, Isai Cerna MD    glucagon (GLUCAGEN) injection 1 mg, 1 mg, IntraMUSCular, PRN, Isai Cerna MD    acetaminophen (TYLENOL) tablet 650 mg, 650 mg, Oral, Q6H PRN **OR** acetaminophen (TYLENOL) suppository 650 mg, 650 mg, Rectal, Q6H PRN, Beverly Cerna MD    polyethylene glycol (MIRALAX) packet 17 g, 17 g, Oral, DAILY PRN, Beverly Cerna MD    ondansetron (ZOFRAN ODT) tablet 4 mg, 4 mg, Oral, Q8H PRN **OR** ondansetron (ZOFRAN) injection 4 mg, 4 mg, IntraVENous, Q6H PRN, Beverly Cerna MD    heparin (porcine) injection 5,000 Units, 5,000 Units, SubCUTAneous, Q8H, Beverly Cerna MD, 5,000 Units at 01/11/23 0536    atorvastatin (LIPITOR) tablet 40 mg, 40 mg, Oral, QHS, Beverly Cerna MD    pantoprazole (PROTONIX) 40 mg in 0.9% sodium chloride 10 mL injection, 40 mg, IntraVENous, DAILY, Beverly Cerna MD, 40 mg at 01/11/23 0944    lisinopriL (PRINIVIL, ZESTRIL) tablet 40 mg, 40 mg, Oral, DAILY, Beverly Cerna MD, 40 mg at 01/11/23 1039

## 2023-01-11 NOTE — MED STUDENT NOTES
General Daily Progress Note          Patient Name:   Ciara Abbasi       YOB: 1950       Age:  67 y.o. Admit Date: 1/9/2023      Subjective:     Patient is a 67y.o. year old female with significant past medical history of type 2 diabetes hypertension chronic A. fib hyperlipidemia came to emergency room complaining of abdominal pain for almost 3 days no vomiting for 3 days getting normal worse no nausea no vomiting no fever no chills came to emergency room seen by the ER physician     CT scan of the abdomen done which shows  Imaging findings consistent with early/partial small bowel obstruction,  transition point in the left lower quadrant most likely related to adhesions. Please see above for additional nonemergent incidental findings     Patient was recommend to be admitted for further evaluation treatment     When I saw the patient patient still have complaining a lot of abdominal pain  I ordered NG tube placement  And surgical consult     1/11  Patient seen and examined in room today. She continues to complain of mild abdominal pain. She also has NG tube in place. Denies bowel movement. Abdominal KUB done this morning shows large volume stool in the colon.       Objective:     Visit Vitals  /69   Pulse 65   Temp 99 °F (37.2 °C)   Resp 18   Ht 5' 4\" (1.626 m)   Wt 98.9 kg (218 lb)   SpO2 94%   Breastfeeding No   BMI 37.42 kg/m²        Recent Results (from the past 24 hour(s))   GLUCOSE, POC    Collection Time: 01/10/23 11:46 AM   Result Value Ref Range    Glucose (POC) 98 65 - 100 mg/dL    Performed by Denise Fragoso, POC    Collection Time: 01/10/23  4:42 PM   Result Value Ref Range    Glucose (POC) 99 65 - 100 mg/dL    Performed by [address] [place], [place] [address]ie [place], POC    Collection Time: 01/10/23  8:03 PM   Result Value Ref Range    Glucose (POC) 110 (H) 65 - 100 mg/dL    Performed by Sakina Zayas      [unfilled]      Review of Systems    Constitutional: Negative for [facility] -   Hematology/Oncology  Progress Note    Patient Name: Nico Garza Jr.  Admission Date: 8/30/2018  Hospital Length of Stay: 0 days  Code Status: Full Code     Subjective:     HPI:  71-year-old gentleman with significant history for smoking/COPD, AFib on Eliquis, diabetes mellitus, hypertension, dyslipidemia, who presented to the emergency room with progressive chest pain/shortness of breath.  He underwent CTA to evaluate for pulmonary embolism which demonstrated large left lung mass with extensive mediastinal lymphadenopathy and left adrenal nodule and lytic lesion of L1.    Pulmonary has been consulted and plans to proceed with bronchoscopy on Sunday 09/02/2018.      Oncology Treatment Plan:   [No treatment plan]    Medications:  Continuous Infusions:  Scheduled Meds:   albuterol-ipratropium  3 mL Nebulization Q6H    diltiaZEM  360 mg Oral Daily    insulin aspart U-100  15 Units Subcutaneous BIDWM    methylPREDNISolone sodium succinate  80 mg Intravenous Q8H    pantoprazole  40 mg Oral Daily    pravastatin  40 mg Oral Daily    quinapril  40 mg Oral Daily     PRN Meds:acetaminophen, aluminum-magnesium hydroxide-simethicone, dextrose 50%, glucagon (human recombinant), guaifenesin 100 mg/5 ml, HYDROcodone-acetaminophen, insulin aspart U-100, morphine, zolpidem     Review of patient's allergies indicates:   Allergen Reactions    Flomax [tamsulosin]      Dizzy          Past Medical History:   Diagnosis Date    Arthritis     Atrial fibrillation and flutter     Atrial flutter     COPD (chronic obstructive pulmonary disease)     DM (diabetes mellitus) 1996    Hyperlipidemia     Hypertension     Lung disease     Neuropathy, diabetic     Pancreatitis      Past Surgical History:   Procedure Laterality Date    BICEPS TENDON REPAIR      CARDIOVERSION      CHOLECYSTECTOMY      PATELLA SURGERY      RADIOFREQUENCY ABLATION      ROTATOR CUFF REPAIR       Family History      Problem Relation (Age of Onset)    Cancer Maternal Aunt    Cataracts Sister    Diabetes Mother, Sister, Sister, Sister    Heart attack Brother    Heart failure Brother    Hypertension Mother, Father    Stroke Mother, Father, Paternal Grandmother, Paternal Grandfather        Tobacco Use    Smoking status: Former Smoker     Packs/day: 0.50     Types: Cigarettes     Start date: 1/1/1960     Last attempt to quit: 3/8/2015     Years since quitting: 3.4    Smokeless tobacco: Former User     Types: Snuff     Quit date: 10/7/1995   Substance and Sexual Activity    Alcohol use: No     Alcohol/week: 0.0 oz    Drug use: No    Sexual activity: Not on file       Review of Systems   Constitutional: Positive for unexpected weight change (Weight loss). Negative for activity change, appetite change, chills, diaphoresis, fatigue and fever.   HENT: Negative for congestion, nosebleeds, sore throat and trouble swallowing.    Eyes: Negative for pain, discharge and visual disturbance.   Respiratory: Positive for shortness of breath. Negative for apnea, cough, choking, chest tightness, wheezing and stridor.    Cardiovascular: Positive for chest pain. Negative for palpitations and leg swelling.   Gastrointestinal: Positive for nausea. Negative for abdominal distention, abdominal pain, anal bleeding, blood in stool, constipation, diarrhea, rectal pain and vomiting.   Endocrine: Negative for cold intolerance and heat intolerance.   Genitourinary: Negative for difficulty urinating, dysuria, flank pain, frequency and urgency.   Musculoskeletal: Positive for back pain. Negative for arthralgias, gait problem, joint swelling, myalgias, neck pain and neck stiffness.   Skin: Negative for rash and wound.   Allergic/Immunologic: Negative for food allergies and immunocompromised state.   Neurological: Negative for dizziness, seizures, syncope, facial asymmetry, weakness, light-headedness and headaches.   Hematological: Negative for adenopathy.  chills and fever. HENT: Negative. Eyes: Negative. Respiratory: Negative. Cardiovascular: Negative. Gastrointestinal: Negative for abdominal pain and nausea. Skin: Negative. Neurological: Negative. Physical Exam:      Constitutional: pt is oriented to person, place, and time. HENT:   Head: Normocephalic and atraumatic. Eyes: Pupils are equal, round, and reactive to light. EOM are normal.   Cardiovascular: Normal rate, regular rhythm and normal heart sounds. Pulmonary/Chest: Breath sounds normal. No wheezes. No rales. Exhibits no tenderness. Abdominal: Soft. Bowel sounds are normal. There is no abdominal tenderness. There is no rebound and no guarding. Musculoskeletal: Normal range of motion. Neurological: pt is alert and oriented to person, place, and time. CTA ABD PELV W WO CONT   Final Result   Imaging findings consistent with early/partial small bowel obstruction,   transition point in the left lower quadrant most likely related to adhesions. Please see above for additional nonemergent incidental findings. XR ABD (KUB)    (Results Pending)        Recent Results (from the past 24 hour(s))   GLUCOSE, POC    Collection Time: 01/10/23 11:46 AM   Result Value Ref Range    Glucose (POC) 98 65 - 100 mg/dL    Performed by Cindy Wild, POC    Collection Time: 01/10/23  4:42 PM   Result Value Ref Range    Glucose (POC) 99 65 - 100 mg/dL    Performed by [place], POC    Collection Time: 01/10/23  8:03 PM   Result Value Ref Range    Glucose (POC) 110 (H) 65 - 100 mg/dL    Performed by Mary Mckeon        Results       ** No results found for the last 336 hours.  **             Labs:     Recent Labs     01/09/23 2013   WBC 4.3   HGB 12.3   HCT 39.7        Recent Labs     01/09/23 2013      K 3.8      CO2 32   BUN 19   CREA 0.63   GLU 92   CA 10.4*     Recent Labs     01/09/23 2013   ALT 17   *   TBILI 0.4   TP   Psychiatric/Behavioral: Negative for agitation, behavioral problems and confusion. The patient is not nervous/anxious.      Objective:     Vital Signs (Most Recent):  Temp: 97 °F (36.1 °C) (08/31/18 1526)  Pulse: 80 (08/31/18 1526)  Resp: 18 (08/31/18 1526)  BP: 124/60 (08/31/18 1526)  SpO2: (!) 90 % (08/31/18 1526) Vital Signs (24h Range):  Temp:  [97 °F (36.1 °C)-98.6 °F (37 °C)] 97 °F (36.1 °C)  Pulse:  [80-97] 80  Resp:  [16-22] 18  SpO2:  [88 %-94 %] 90 %  BP: (116-137)/(60-81) 124/60     Weight: 84.5 kg (186 lb 6.4 oz)  Body mass index is 28.34 kg/m².  Body surface area is 2.01 meters squared.      Intake/Output Summary (Last 24 hours) at 8/31/2018 1628  Last data filed at 8/31/2018 0605  Gross per 24 hour   Intake 271.25 ml   Output --   Net 271.25 ml       Physical Exam   Constitutional: He is oriented to person, place, and time. He appears well-developed and well-nourished.   HENT:   Head: Normocephalic and atraumatic.   Mouth/Throat: No oropharyngeal exudate.   Eyes: Conjunctivae are normal. Pupils are equal, round, and reactive to light. Right eye exhibits no discharge. Left eye exhibits no discharge.   Neck: Neck supple. No JVD present. No tracheal deviation present. No thyromegaly present.   Cardiovascular: Normal rate, regular rhythm and normal heart sounds.   No murmur heard.  Pulmonary/Chest: Effort normal. No stridor. No respiratory distress. He has decreased breath sounds. He has wheezes in the right lower field and the left lower field. He has no rhonchi. He has no rales.   Abdominal: Soft. Bowel sounds are normal.   Musculoskeletal: Normal range of motion. He exhibits no edema or tenderness.   Lymphadenopathy:     He has no cervical adenopathy.   Neurological: He is alert and oriented to person, place, and time.   Skin: Skin is warm, dry and intact. Capillary refill takes less than 2 seconds. No abrasion, no bruising, no ecchymosis and no rash noted. No cyanosis. Nails show no clubbing.  7. 5   ALB 3.5   GLOB 4.0   LPSE 141     No results for input(s): INR, PTP, APTT, INREXT in the last 72 hours. No results for input(s): FE, TIBC, PSAT, FERR in the last 72 hours. No results found for: FOL, RBCF   No results for input(s): PH, PCO2, PO2 in the last 72 hours. No results for input(s): CPK, CKNDX, TROIQ in the last 72 hours. No lab exists for component: CPKMB  No results found for: CHOL, CHOLX, CHLST, CHOLV, HDL, HDLP, LDL, LDLC, DLDLP, TGLX, TRIGL, TRIGP, CHHD, CHHDX  Lab Results   Component Value Date/Time    Glucose (POC) 110 (H) 01/10/2023 08:03 PM    Glucose (POC) 99 01/10/2023 04:42 PM    Glucose (POC) 98 01/10/2023 11:46 AM    Glucose (POC) 84 01/10/2023 08:39 AM    Glucose (POC) 226 (H) 10/11/2021 11:39 AM     Lab Results   Component Value Date/Time    Color Yellow/Straw 10/05/2021 11:25 AM    Appearance Clear 10/05/2021 11:25 AM    Specific gravity 1.014 10/05/2021 11:25 AM    pH (UA) 7.0 10/05/2021 11:25 AM    Protein Negative 10/05/2021 11:25 AM    Glucose Negative 10/05/2021 11:25 AM    Ketone Negative 10/05/2021 11:25 AM    Bilirubin Negative 10/05/2021 11:25 AM    Urobilinogen 0.1 10/05/2021 11:25 AM    Nitrites Negative 10/05/2021 11:25 AM    Leukocyte Esterase Negative 10/05/2021 11:25 AM    Bacteria Negative 10/05/2021 11:25 AM    WBC 0-4 10/05/2021 11:25 AM    RBC 0-5 10/05/2021 11:25 AM         Assessment:     Small bowel obstruction  Type 2 diabetes  Hypertension  Chronic A.  Fib  Hyperlipidemia  Neuropathy    Plan:   Eliquis held by GI  Continue n.p.o/NG tube  Continue to monitor labs  Per surgery if things do not improve over next 24-36 hours will do surgical exploration          Current Facility-Administered Medications:     morphine injection 2 mg, 2 mg, IntraVENous, Q4H PRN, Beverly Cerna MD, 2 mg at 01/10/23 2359    dextrose 5 % - 0.45% NaCl infusion, 50 mL/hr, IntraVENous, CONTINUOUS, Beverly Cerna MD, Last Rate: 50 mL/hr at 01/10/23 1038, 50 mL/hr at 01/10/23   Nursing note and vitals reviewed.      Significant Labs:   CBC:   Recent Labs   Lab  08/30/18 2111 08/31/18 0452   WBC  9.05  8.46   HGB  14.5  14.5   HCT  43.2  43.4   PLT  301  307   , CMP:   Recent Labs   Lab  08/30/18 2111 08/31/18 0452   NA  140  140   K  4.2  3.8   CL  100  99   CO2  25  25   GLU  178*  177*   BUN  19  20   CREATININE  1.0  1.0   CALCIUM  9.5  9.7   PROT  8.2  7.9   ALBUMIN  3.2*  3.2*   BILITOT  0.4  0.4   ALKPHOS  114  117   AST  26  17   ALT  18  16   ANIONGAP  15  16   EGFRNONAA  >60  >60   , Coagulation:   Recent Labs   Lab  08/30/18 2111   INR  1.1   APTT  30.3   , Haptoglobin: No results for input(s): HAPTOGLOBIN in the last 48 hours., Immunology: No results for input(s): SPEP, JANAE, CARMEN, FREELAMBDALI in the last 48 hours., LDH: No results for input(s): LDHCSF, BFSOURCE in the last 48 hours. and LFTs:   Recent Labs   Lab  08/30/18 2111 08/31/18 0452   ALT  18  16   AST  26  17   ALKPHOS  114  117   BILITOT  0.4  0.4   PROT  8.2  7.9   ALBUMIN  3.2*  3.2*       Diagnostic Results:  I have reviewed all pertinent imaging results/findings within the past 24 hours.    Assessment/Plan:     Mass of lower lobe of left lung    Left lower lung mass with extensive mediastinal lymphadenopathy, left adrenal nodule, and L1 lytic lesion consistent with metastatic lung cancer.  Pulmonary is planning bronchoscopy on Sunday for tissue diagnosis.  Treatment will be determined by tissue diagnosis and final staging.  He will need a PET scan and MRI of the brain as an outpatient            Thank you for your consult. I will follow-up with patient. Please contact us if you have any additional questions.     Issa Lagos Jr, MD  Hematology/Oncology  [facility] -      [phone]    [Held by provider] apixaban (ELIQUIS) tablet 5 mg, 5 mg, Oral, BID, Mohiuddin, Janie Fothergill, MD    dilTIAZem ER (CARDIZEM CD) capsule 120 mg, 120 mg, Oral, DAILY, Beverly Cerna MD, 120 mg at 01/10/23 1245    insulin lispro (HUMALOG) injection, , SubCUTAneous, AC&HS, Mohiuddin, Janie Fothergill, MD    glucose chewable tablet 16 g, 4 Tablet, Oral, PRN, Mohiuddin, Janie Fothergill, MD    glucagon (GLUCAGEN) injection 1 mg, 1 mg, IntraMUSCular, PRN, Mohiuddin, Janie Fothergill, MD    acetaminophen (TYLENOL) tablet 650 mg, 650 mg, Oral, Q6H PRN **OR** acetaminophen (TYLENOL) suppository 650 mg, 650 mg, Rectal, Q6H PRN, Beverly Cerna MD    polyethylene glycol (MIRALAX) packet 17 g, 17 g, Oral, DAILY PRN, Beverly Cerna MD    ondansetron (ZOFRAN ODT) tablet 4 mg, 4 mg, Oral, Q8H PRN **OR** ondansetron (ZOFRAN) injection 4 mg, 4 mg, IntraVENous, Q6H PRN, Beverly Cerna MD    heparin (porcine) injection 5,000 Units, 5,000 Units, SubCUTAneous, Q8H, Beverly Cerna MD, 5,000 Units at 01/11/23 0536    atorvastatin (LIPITOR) tablet 40 mg, 40 mg, Oral, QHS, Beverly Cerna MD    pantoprazole (PROTONIX) 40 mg in 0.9% sodium chloride 10 mL injection, 40 mg, IntraVENous, DAILY, Beverly Cerna MD, 40 mg at 01/10/23 1038    lisinopriL (PRINIVIL, ZESTRIL) tablet 40 mg, 40 mg, Oral, DAILY, Mohiuddin, Janie Fothergill, MD                   *ATTENTION:  This note has been created by a medical student for educational purposes only. Please do not refer to the content of this note for clinical decision-making, billing, or other purposes. Please see attending physicians note to obtain clinical information on this patient. *

## 2023-01-11 NOTE — PROGRESS NOTES
Progress Note    Patient: Kaden Brown MRN: [id number]  SSN: [id number]    YOB: 1950  Age: 67 y.o. Sex: female      Admit Date: 1/9/2023    LOS: 2 days     Subjective:   Hospital day 2 for partial small bowel obstruction and constipation  Patient denies bowel movement or flatus  NG tube in place, approximately 400 mL in the canister; no ins and outs recorded so I assume this is for the whole day yesterday  Continues to complain of some mild abdominal pain    Objective:     Vitals:    01/10/23 1500 01/10/23 1635 01/10/23 2006 01/10/23 2336   BP: (!) 149/72 (!) 146/78  132/69   Pulse: 87 96 91 65   Resp: 16 18 18 18   Temp:  98.7 °F (37.1 °C) 97.8 °F (36.6 °C) 99 °F (37.2 °C)   SpO2: 90% 92% 94% 94%   Weight:       Height:            Intake and Output:  Current Shift: No intake/output data recorded. Last three shifts: No intake/output data recorded.     Review of Systems:  ROS     Physical Exam:   Minutes soft, nondistended, mildly tender palpation diffusely with no guarding    Lab/Data Review:  Recent Results (from the past 24 hour(s))   GLUCOSE, POC    Collection Time: 01/10/23 11:46 AM   Result Value Ref Range    Glucose (POC) 98 65 - 100 mg/dL    Performed by Candido Cárdenas, POC    Collection Time: 01/10/23  4:42 PM   Result Value Ref Range    Glucose (POC) 99 65 - 100 mg/dL    Performed by Shola Abad    GLUCOSE, POC    Collection Time: 01/10/23  8:03 PM   Result Value Ref Range    Glucose (POC) 110 (H) 65 - 100 mg/dL    Performed by Max Germania    METABOLIC PANEL, COMPREHENSIVE    Collection Time: 01/11/23  8:53 AM   Result Value Ref Range    Sodium 138 136 - 145 mmol/L    Potassium 3.7 3.5 - 5.1 mmol/L    Chloride 105 97 - 108 mmol/L    CO2 26 21 - 32 mmol/L    Anion gap 7 5 - 15 mmol/L    Glucose 118 (H) 65 - 100 mg/dL    BUN 14 6 - 20 mg/dL    Creatinine 0.55 0.55 - 1.02 mg/dL    BUN/Creatinine ratio 25 (H) 12 - 20      eGFR >60 >60 ml/min/1.73m2    Calcium 10.4 (H) 8.5 - 10.1 mg/dL    Bilirubin, total 0.5 0.2 - 1.0 mg/dL    AST (SGOT) 13 (L) 15 - 37 U/L    ALT (SGPT) 18 12 - 78 U/L    Alk. phosphatase 108 45 - 117 U/L    Protein, total 7.6 6.4 - 8.2 g/dL    Albumin 3.4 (L) 3.5 - 5.0 g/dL    Globulin 4.2 (H) 2.0 - 4.0 g/dL    A-G Ratio 0.8 (L) 1.1 - 2.2     GLUCOSE, POC    Collection Time: 01/11/23  9:49 AM   Result Value Ref Range    Glucose (POC) 107 (H) 65 - 100 mg/dL    Performed by Jani Esparza           Assessment:     Active Problems:    Partial small bowel obstruction ([address] (1/9/2023)      SBO (small bowel obstruction) ([address] (1/10/2023)        Plan:   Abdominal KUB shows large volume stool in the colon, no gas-filled small bowel loops that can appreciate however will not distinguish fluid-filled loops. Soap evelyn enema this a.m. Continue n.p.o., NG tube  CMP this a.m. looks fine, no significant abnormalities. CBC pending. I discussed with the patient and her daughter who was in the room with her that if things do not improve over the next 24 to 36 hours she may require exploration for her small bowel obstruction.     Signed By: Bonita Dumont MD     January 11, 2023

## 2023-01-12 ENCOUNTER — APPOINTMENT (OUTPATIENT)
Dept: GENERAL RADIOLOGY | Age: 73
DRG: 389 | End: 2023-01-12
Attending: FAMILY MEDICINE
Payer: MEDICARE

## 2023-01-12 ENCOUNTER — APPOINTMENT (OUTPATIENT)
Dept: GENERAL RADIOLOGY | Age: 73
DRG: 389 | End: 2023-01-12
Attending: COLON & RECTAL SURGERY
Payer: MEDICARE

## 2023-01-12 LAB
ALBUMIN SERPL-MCNC: 3 G/DL (ref 3.5–5)
ALBUMIN/GLOB SERPL: 0.8 (ref 1.1–2.2)
ALP SERPL-CCNC: 92 U/L (ref 45–117)
ALT SERPL-CCNC: 16 U/L (ref 12–78)
ANION GAP SERPL CALC-SCNC: 3 MMOL/L (ref 5–15)
AST SERPL W P-5'-P-CCNC: 11 U/L (ref 15–37)
BASOPHILS # BLD: 0 K/UL (ref 0–0.1)
BASOPHILS NFR BLD: 0 % (ref 0–1)
BILIRUB SERPL-MCNC: 0.4 MG/DL (ref 0.2–1)
BUN SERPL-MCNC: 12 MG/DL (ref 6–20)
BUN/CREAT SERPL: 24 (ref 12–20)
CA-I BLD-MCNC: 10.1 MG/DL (ref 8.5–10.1)
CHLORIDE SERPL-SCNC: 102 MMOL/L (ref 97–108)
CO2 SERPL-SCNC: 35 MMOL/L (ref 21–32)
CREAT SERPL-MCNC: 0.5 MG/DL (ref 0.55–1.02)
DIFFERENTIAL METHOD BLD: NORMAL
EOSINOPHIL # BLD: 0 K/UL (ref 0–0.4)
EOSINOPHIL NFR BLD: 0 % (ref 0–7)
ERYTHROCYTE [DISTWIDTH] IN BLOOD BY AUTOMATED COUNT: 12.4 % (ref 11.5–14.5)
GLOBULIN SER CALC-MCNC: 4 G/DL (ref 2–4)
GLUCOSE BLD STRIP.AUTO-MCNC: 118 MG/DL (ref 65–100)
GLUCOSE BLD STRIP.AUTO-MCNC: 123 MG/DL (ref 65–100)
GLUCOSE BLD STRIP.AUTO-MCNC: 142 MG/DL (ref 65–100)
GLUCOSE BLD STRIP.AUTO-MCNC: 148 MG/DL (ref 65–100)
GLUCOSE SERPL-MCNC: 134 MG/DL (ref 65–100)
HCT VFR BLD AUTO: 39.5 % (ref 35–47)
HGB BLD-MCNC: 12 G/DL (ref 11.5–16)
IMM GRANULOCYTES # BLD AUTO: 0 K/UL (ref 0–0.04)
IMM GRANULOCYTES NFR BLD AUTO: 0 % (ref 0–0.5)
LYMPHOCYTES # BLD: 1.3 K/UL (ref 0.8–3.5)
LYMPHOCYTES NFR BLD: 16 % (ref 12–49)
MCH RBC QN AUTO: 27.3 PG (ref 26–34)
MCHC RBC AUTO-ENTMCNC: 30.4 G/DL (ref 30–36.5)
MCV RBC AUTO: 90 FL (ref 80–99)
MONOCYTES # BLD: 0.8 K/UL (ref 0–1)
MONOCYTES NFR BLD: 10 % (ref 5–13)
NEUTS SEG # BLD: 5.8 K/UL (ref 1.8–8)
NEUTS SEG NFR BLD: 74 % (ref 32–75)
NRBC # BLD: 0 K/UL (ref 0–0.01)
NRBC BLD-RTO: 0 PER 100 WBC
PERFORMED BY, TECHID: ABNORMAL
PLATELET # BLD AUTO: 164 K/UL (ref 150–400)
PMV BLD AUTO: 10.9 FL (ref 8.9–12.9)
POTASSIUM SERPL-SCNC: 3.4 MMOL/L (ref 3.5–5.1)
PROT SERPL-MCNC: 7 G/DL (ref 6.4–8.2)
RBC # BLD AUTO: 4.39 M/UL (ref 3.8–5.2)
SODIUM SERPL-SCNC: 140 MMOL/L (ref 136–145)
WBC # BLD AUTO: 7.9 K/UL (ref 3.6–11)

## 2023-01-12 PROCEDURE — 74011636637 HC RX REV CODE- 636/637: Performed by: FAMILY MEDICINE

## 2023-01-12 PROCEDURE — 82962 GLUCOSE BLOOD TEST: CPT

## 2023-01-12 PROCEDURE — 65270000029 HC RM PRIVATE

## 2023-01-12 PROCEDURE — 85025 COMPLETE CBC W/AUTO DIFF WBC: CPT

## 2023-01-12 PROCEDURE — 94761 N-INVAS EAR/PLS OXIMETRY MLT: CPT

## 2023-01-12 PROCEDURE — 74011250637 HC RX REV CODE- 250/637: Performed by: FAMILY MEDICINE

## 2023-01-12 PROCEDURE — 74011250636 HC RX REV CODE- 250/636: Performed by: INTERNAL MEDICINE

## 2023-01-12 PROCEDURE — C9113 INJ PANTOPRAZOLE SODIUM, VIA: HCPCS | Performed by: FAMILY MEDICINE

## 2023-01-12 PROCEDURE — 94640 AIRWAY INHALATION TREATMENT: CPT

## 2023-01-12 PROCEDURE — 80053 COMPREHEN METABOLIC PANEL: CPT

## 2023-01-12 PROCEDURE — 36415 COLL VENOUS BLD VENIPUNCTURE: CPT

## 2023-01-12 PROCEDURE — 74011250636 HC RX REV CODE- 250/636: Performed by: FAMILY MEDICINE

## 2023-01-12 PROCEDURE — 71046 X-RAY EXAM CHEST 2 VIEWS: CPT

## 2023-01-12 PROCEDURE — 77010033678 HC OXYGEN DAILY

## 2023-01-12 PROCEDURE — 74018 RADEX ABDOMEN 1 VIEW: CPT

## 2023-01-12 PROCEDURE — 74011000250 HC RX REV CODE- 250: Performed by: FAMILY MEDICINE

## 2023-01-12 RX ORDER — HYDRALAZINE HYDROCHLORIDE 20 MG/ML
10 INJECTION INTRAMUSCULAR; INTRAVENOUS
Status: DISCONTINUED | OUTPATIENT
Start: 2023-01-12 | End: 2023-01-16 | Stop reason: HOSPADM

## 2023-01-12 RX ORDER — IPRATROPIUM BROMIDE AND ALBUTEROL SULFATE 2.5; .5 MG/3ML; MG/3ML
3 SOLUTION RESPIRATORY (INHALATION)
Status: DISCONTINUED | OUTPATIENT
Start: 2023-01-12 | End: 2023-01-13

## 2023-01-12 RX ADMIN — MORPHINE SULFATE 2 MG: 2 INJECTION, SOLUTION INTRAMUSCULAR; INTRAVENOUS at 10:30

## 2023-01-12 RX ADMIN — IPRATROPIUM BROMIDE AND ALBUTEROL SULFATE 3 ML: 2.5; .5 SOLUTION RESPIRATORY (INHALATION) at 20:32

## 2023-01-12 RX ADMIN — HEPARIN SODIUM 5000 UNITS: 5000 INJECTION INTRAVENOUS; SUBCUTANEOUS at 13:36

## 2023-01-12 RX ADMIN — LISINOPRIL 40 MG: 40 TABLET ORAL at 10:30

## 2023-01-12 RX ADMIN — HEPARIN SODIUM 5000 UNITS: 5000 INJECTION INTRAVENOUS; SUBCUTANEOUS at 05:35

## 2023-01-12 RX ADMIN — HEPARIN SODIUM 5000 UNITS: 5000 INJECTION INTRAVENOUS; SUBCUTANEOUS at 21:02

## 2023-01-12 RX ADMIN — SODIUM CHLORIDE, PRESERVATIVE FREE 40 MG: 5 INJECTION INTRAVENOUS at 10:30

## 2023-01-12 RX ADMIN — IPRATROPIUM BROMIDE AND ALBUTEROL SULFATE 3 ML: 2.5; .5 SOLUTION RESPIRATORY (INHALATION) at 14:16

## 2023-01-12 RX ADMIN — INSULIN LISPRO 2 UNITS: 100 INJECTION, SOLUTION INTRAVENOUS; SUBCUTANEOUS at 10:31

## 2023-01-12 RX ADMIN — DEXTROSE AND SODIUM CHLORIDE 50 ML/HR: 5; 450 INJECTION, SOLUTION INTRAVENOUS at 13:59

## 2023-01-12 RX ADMIN — DILTIAZEM HYDROCHLORIDE 120 MG: 120 CAPSULE, COATED, EXTENDED RELEASE ORAL at 10:30

## 2023-01-12 NOTE — PROGRESS NOTES
Soap  Suds enema administered,  Pt tolerated well well without complaints. 5-6 small round pieces noted to watery stool.

## 2023-01-12 NOTE — PROGRESS NOTES
Physician Progress Note      PATIENT:               Rocio Baker  CSN #:                  [id number]  :                       1950  ADMIT DATE:       2023 8:04 PM  [address] [place] DATE:  RESPONDING  PROVIDER #:        Mateo Kennedy MD          QUERY TEXT:    Dear Dr. Camden Lees,    Patient admitted with partial small bowel obstruction due to adhesions. Patient noted to have chronic atrial fibrillation and is maintained on Eliquis 5mg PO BID. If possible, please document in progress notes and discharge summary if you are evaluating and/or treating any of the following: The medical record reflects the following:  Risk Factors: 67 F presents with abdominal pain; admitted with partial small bowel obstruction due to adhesions  Clinical Indicators: patient with chronic atrial fibrillation and takes Eliquis 5mg PO BID; patient is a 67 F with HTN, DM  Treatment: Eliquis, EKG    Thank you,  BIMAL MccormickN, RN, CRCR  Clinical   [email or web address] or contact via [employer]  Options provided:  -- Secondary hypercoagulable state in a patient with atrial fibrillation  -- Other - I will add my own diagnosis  -- Disagree - Not applicable / Not valid  -- Disagree - Clinically unable to determine / Unknown  -- Refer to Clinical Documentation Reviewer    PROVIDER RESPONSE TEXT:    This patient has secondary hypercoagulable state in a patient with atrial fibrillation.     Query created by: Jacquelyn Bass on 2023 11:52 AM      Electronically signed by:  Mateo Kennedy MD 2023 4:22 PM

## 2023-01-12 NOTE — MED STUDENT NOTES
General Daily Progress Note          Patient Name:   Sheyla Ga       YOB: 1950       Age:  67 y.o. Admit Date: 1/9/2023      Subjective:     Patient is a 67y.o. year old female with significant past medical history of type 2 diabetes hypertension chronic A. fib hyperlipidemia came to emergency room complaining of abdominal pain for almost 3 days no vomiting for 3 days getting normal worse no nausea no vomiting no fever no chills came to emergency room seen by the ER physician     CT scan of the abdomen done which shows  Imaging findings consistent with early/partial small bowel obstruction,  transition point in the left lower quadrant most likely related to adhesions. Please see above for additional nonemergent incidental findings     Patient was recommend to be admitted for further evaluation treatment     When I saw the patient patient still have complaining a lot of abdominal pain  I ordered NG tube placement  And surgical consult     1/11  Patient seen and examined in room today. She continues to complain of mild abdominal pain. She also has NG tube in place. Denies bowel movement. Abdominal KUB done this morning shows large volume stool in the colon. 1/12  Patient seen and examined in room today. She complains of pain on her left side of face. Denies any numbness or tingling. Brief neuro exam showed no evidence of stroke. She is also reporting shortness of breath. Per nurse at bedside her sats went below 88% this morning so she was put on 2L nasal cannula. Patient is very lethargic today. Soap evelyn enema done yesterday. Patient tolerated well without complaints and 5-6 small round pieces were noted to watery stool. Patient denies any abdominal pain today. Lab techs are having difficulty sticking her so we have limited lab info on chart. X ray of abdomen done this morning shows no visible small bowel obstruction at this time. Decreased mild colonic constipation pattern. Persistent left lung base atelectasis versus pneumonia. Objective:     Visit Vitals  /73 (BP 1 Location: Left upper arm, BP Patient Position: At rest)   Pulse 100   Temp 98.2 °F (36.8 °C)   Resp 18   Ht 5' 4\" (1.626 m)   Wt 98.9 kg (218 lb)   SpO2 (!) 85%   Breastfeeding No   BMI 37.42 kg/m²        Recent Results (from the past 24 hour(s))   METABOLIC PANEL, COMPREHENSIVE    Collection Time: 01/11/23  8:53 AM   Result Value Ref Range    Sodium 138 136 - 145 mmol/L    Potassium 3.7 3.5 - 5.1 mmol/L    Chloride 105 97 - 108 mmol/L    CO2 26 21 - 32 mmol/L    Anion gap 7 5 - 15 mmol/L    Glucose 118 (H) 65 - 100 mg/dL    BUN 14 6 - 20 mg/dL    Creatinine 0.55 0.55 - 1.02 mg/dL    BUN/Creatinine ratio 25 (H) 12 - 20      eGFR >60 >60 ml/min/1.73m2    Calcium 10.4 (H) 8.5 - 10.1 mg/dL    Bilirubin, total 0.5 0.2 - 1.0 mg/dL    AST (SGOT) 13 (L) 15 - 37 U/L    ALT (SGPT) 18 12 - 78 U/L    Alk. phosphatase 108 45 - 117 U/L    Protein, total 7.6 6.4 - 8.2 g/dL    Albumin 3.4 (L) 3.5 - 5.0 g/dL    Globulin 4.2 (H) 2.0 - 4.0 g/dL    A-G Ratio 0.8 (L) 1.1 - 2.2     GLUCOSE, POC    Collection Time: 01/11/23  9:49 AM   Result Value Ref Range    Glucose (POC) 107 (H) 65 - 100 mg/dL    Performed by Monikacj Kelley, POC    Collection Time: 01/11/23 12:42 PM   Result Value Ref Range    Glucose (POC) 139 (H) 65 - 100 mg/dL    Performed by Monikacj Kelley, POC    Collection Time: 01/11/23  3:50 PM   Result Value Ref Range    Glucose (POC) 122 (H) 65 - 100 mg/dL    Performed by Monika Allie, POC    Collection Time: 01/11/23  7:59 PM   Result Value Ref Range    Glucose (POC) 119 (H) 65 - 100 mg/dL    Performed by Asia King    GLUCOSE, POC    Collection Time: 01/12/23  7:34 AM   Result Value Ref Range    Glucose (POC) 142 (H) 65 - 100 mg/dL    Performed by Lisa Velásquez      [unfilled]      Review of Systems    Constitutional: Negative for chills and fever. HENT: Negative. Eyes: Negative. Respiratory: Negative. Cardiovascular: Negative. Gastrointestinal: Negative for abdominal pain and nausea. Skin: Negative. Neurological: Negative. Physical Exam:      Constitutional: pt is oriented to person, place, and time. HENT:   Head: Normocephalic and atraumatic. Eyes: Pupils are equal, round, and reactive to light. EOM are normal.   Cardiovascular: Normal rate, regular rhythm and normal heart sounds. Pulmonary/Chest: Breath sounds normal. No wheezes. No rales. Exhibits no tenderness. Abdominal: Soft. Bowel sounds are normal. There is no abdominal tenderness. There is no rebound and no guarding. Musculoskeletal: Normal range of motion. Neurological: pt is alert and oriented to person, place, and time. XR ABD (KUB)   Final Result   Nasogastric tube over the stomach. Large amount colonic stool. No definite   change allowing for technical differences compared to CT. CTA ABD PELV W WO CONT   Final Result   Imaging findings consistent with early/partial small bowel obstruction,   transition point in the left lower quadrant most likely related to adhesions. Please see above for additional nonemergent incidental findings. Recent Results (from the past 24 hour(s))   METABOLIC PANEL, COMPREHENSIVE    Collection Time: 01/11/23  8:53 AM   Result Value Ref Range    Sodium 138 136 - 145 mmol/L    Potassium 3.7 3.5 - 5.1 mmol/L    Chloride 105 97 - 108 mmol/L    CO2 26 21 - 32 mmol/L    Anion gap 7 5 - 15 mmol/L    Glucose 118 (H) 65 - 100 mg/dL    BUN 14 6 - 20 mg/dL    Creatinine 0.55 0.55 - 1.02 mg/dL    BUN/Creatinine ratio 25 (H) 12 - 20      eGFR >60 >60 ml/min/1.73m2    Calcium 10.4 (H) 8.5 - 10.1 mg/dL    Bilirubin, total 0.5 0.2 - 1.0 mg/dL    AST (SGOT) 13 (L) 15 - 37 U/L    ALT (SGPT) 18 12 - 78 U/L    Alk.  phosphatase 108 45 - 117 U/L    Protein, total 7.6 6.4 - 8.2 g/dL    Albumin 3.4 (L) 3.5 - 5.0 g/dL    Globulin 4.2 (H) 2.0 - 4.0 g/dL    A-G Ratio 0.8 (L) 1.1 - 2.2     GLUCOSE, POC    Collection Time: 01/11/23  9:49 AM   Result Value Ref Range    Glucose (POC) 107 (H) 65 - 100 mg/dL    Performed by Veronika Yang, POC    Collection Time: 01/11/23 12:42 PM   Result Value Ref Range    Glucose (POC) 139 (H) 65 - 100 mg/dL    Performed by Veronika Yang, POC    Collection Time: 01/11/23  3:50 PM   Result Value Ref Range    Glucose (POC) 122 (H) 65 - 100 mg/dL    Performed by Veronika Yang, POC    Collection Time: 01/11/23  7:59 PM   Result Value Ref Range    Glucose (POC) 119 (H) 65 - 100 mg/dL    Performed by Staci Burks    GLUCOSE, POC    Collection Time: 01/12/23  7:34 AM   Result Value Ref Range    Glucose (POC) 142 (H) 65 - 100 mg/dL    Performed by Patricia Muniz        Results       ** No results found for the last 336 hours. **             Labs:     Recent Labs     01/09/23 2013   WBC 4.3   HGB 12.3   HCT 39.7          Recent Labs     01/11/23 0853 01/09/23 2013    142   K 3.7 3.8    107   CO2 26 32   BUN 14 19   CREA 0.55 0.63   * 92   CA 10.4* 10.4*       Recent Labs     01/11/23 0853 01/09/23 2013   ALT 18 17    124*   TBILI 0.5 0.4   TP 7.6 7.5   ALB 3.4* 3.5   GLOB 4.2* 4.0   LPSE  --  141       No results for input(s): INR, PTP, APTT, INREXT, INREXT in the last 72 hours. No results for input(s): FE, TIBC, PSAT, FERR in the last 72 hours. No results found for: FOL, RBCF   No results for input(s): PH, PCO2, PO2 in the last 72 hours. No results for input(s): CPK, CKNDX, TROIQ in the last 72 hours.     No lab exists for component: CPKMB  No results found for: CHOL, CHOLX, CHLST, CHOLV, HDL, HDLP, LDL, LDLC, DLDLP, TGLX, TRIGL, TRIGP, CHHD, CHHDX  Lab Results   Component Value Date/Time    Glucose (POC) 142 (H) 01/12/2023 07:34 AM    Glucose (POC) 119 (H) 01/11/2023 07:59 PM    Glucose (POC) 122 (H) 01/11/2023 03:50 PM    Glucose (POC) 139 (H) 01/11/2023 12:42 PM    Glucose (POC) 107 (H) 01/11/2023 09:49 AM     Lab Results   Component Value Date/Time    Color Yellow/Straw 10/05/2021 11:25 AM    Appearance Clear 10/05/2021 11:25 AM    Specific gravity 1.014 10/05/2021 11:25 AM    pH (UA) 7.0 10/05/2021 11:25 AM    Protein Negative 10/05/2021 11:25 AM    Glucose Negative 10/05/2021 11:25 AM    Ketone Negative 10/05/2021 11:25 AM    Bilirubin Negative 10/05/2021 11:25 AM    Urobilinogen 0.1 10/05/2021 11:25 AM    Nitrites Negative 10/05/2021 11:25 AM    Leukocyte Esterase Negative 10/05/2021 11:25 AM    Bacteria Negative 10/05/2021 11:25 AM    WBC 0-4 10/05/2021 11:25 AM    RBC 0-5 10/05/2021 11:25 AM         Assessment:     Small bowel obstruction  Type 2 diabetes  Hypertension  Chronic A.  Fib  Hyperlipidemia  Neuropathy  Pneumonia    Plan:   Eliquis held by GI  Continue n.p.o/NG tube  Continue to monitor labs  If things do not improve over next 24-36 hours will do surgical exploration    Order midline placement for drawing labs          Current Facility-Administered Medications:     morphine injection 2 mg, 2 mg, IntraVENous, Q4H PRN, Rosa Donaldson MD, 2 mg at 01/11/23 2016    dextrose 5 % - 0.45% NaCl infusion, 50 mL/hr, IntraVENous, CONTINUOUS, Beverly Cerna MD, Last Rate: 50 mL/hr at 01/11/23 2154, 50 mL/hr at 01/11/23 2154    [Held by provider] apixaban (ELIQUIS) tablet 5 mg, 5 mg, Oral, BID, Ivana Cerna MD    dilTIAZem ER (CARDIZEM CD) capsule 120 mg, 120 mg, Oral, DAILY, Beverly Cerna MD, 120 mg at 01/11/23 1039    insulin lispro (HUMALOG) injection, , SubCUTAneous, AC&HS, Ivana Cerna MD    glucose chewable tablet 16 g, 4 Tablet, Oral, PRN, Beverly Cerna MD    glucagon (GLUCAGEN) injection 1 mg, 1 mg, IntraMUSCular, PRN, Beverly Cerna MD    acetaminophen (TYLENOL) tablet 650 mg, 650 mg, Oral, Q6H PRN **OR** acetaminophen (TYLENOL) suppository 650 mg, 650 mg, Rectal, Q6H PRN, Ivana Cerna MD    polyethylene glycol (MIRALAX) packet 17 g, 17 g, Oral, DAILY PRN, Beverly Cerna MD    ondansetron (ZOFRAN ODT) tablet 4 mg, 4 mg, Oral, Q8H PRN **OR** ondansetron (ZOFRAN) injection 4 mg, 4 mg, IntraVENous, Q6H PRN, Beverly Cerna MD    heparin (porcine) injection 5,000 Units, 5,000 Units, SubCUTAneous, Q8H, Beverly Cerna MD, 5,000 Units at 01/12/23 0535    atorvastatin (LIPITOR) tablet 40 mg, 40 mg, Oral, QHS, Beverly Cerna MD    pantoprazole (PROTONIX) 40 mg in 0.9% sodium chloride 10 mL injection, 40 mg, IntraVENous, DAILY, Beverly Cerna MD, 40 mg at 01/11/23 0937    lisinopriL (PRINIVIL, ZESTRIL) tablet 40 mg, 40 mg, Oral, DAILY, Beverly Cerna MD, 40 mg at 01/11/23 1039

## 2023-01-12 NOTE — MED STUDENT NOTES
General Daily Progress Note          Patient Name:   Jann Armstrong       YOB: 1950       Age:  67 y.o. Admit Date: 1/9/2023      Subjective:     Patient is a 67y.o. year old female with significant past medical history of type 2 diabetes hypertension chronic A. fib hyperlipidemia came to emergency room complaining of abdominal pain for almost 3 days no vomiting for 3 days getting normal worse no nausea no vomiting no fever no chills came to emergency room seen by the ER physician     CT scan of the abdomen done which shows  Imaging findings consistent with early/partial small bowel obstruction,  transition point in the left lower quadrant most likely related to adhesions. Please see above for additional nonemergent incidental findings     Patient was recommend to be admitted for further evaluation treatment     When I saw the patient patient still have complaining a lot of abdominal pain  I ordered NG tube placement  And surgical consult     1/11  Patient seen and examined in room today. She continues to complain of mild abdominal pain. She also has NG tube in place. Denies bowel movement. Abdominal KUB done this morning shows large volume stool in the colon. 1/12  Patient seen and examined in room today. She complains of pain on her left side of face. Denies any numbness or tingling. Brief neuro exam showed no evidence of stroke. She is also reporting shortness of breath. Per nurse at bedside her sats went below 88% this morning so she was put on 2L nasal cannula. Patient is very lethargic today. Soap evelyn enema done yesterday. Patient tolerated well without complaints and 5-6 small round pieces were noted to watery stool. Patient denies any abdominal pain today. Lab techs are having difficulty sticking her so we have limited lab info on chart. X ray of abdomen done this morning shows no visible small bowel obstruction at this time. Decreased mild colonic constipation pattern. Persistent left lung base atelectasis versus pneumonia. Objective:     Visit Vitals  /73 (BP 1 Location: Left upper arm, BP Patient Position: At rest)   Pulse 100   Temp 98.2 °F (36.8 °C)   Resp 18   Ht 5' 4\" (1.626 m)   Wt 98.9 kg (218 lb)   SpO2 100%   Breastfeeding No   BMI 37.42 kg/m²        Recent Results (from the past 24 hour(s))   GLUCOSE, POC    Collection Time: 01/11/23 12:42 PM   Result Value Ref Range    Glucose (POC) 139 (H) 65 - 100 mg/dL    Performed by [id number], POC    Collection Time: 01/11/23  3:50 PM   Result Value Ref Range    Glucose (POC) 122 (H) 65 - 100 mg/dL    Performed by Lenore Kebede    GLUCOSE, POC    Collection Time: 01/11/23  7:59 PM   Result Value Ref Range    Glucose (POC) 119 (H) 65 - 100 mg/dL    Performed by Onita Fam    GLUCOSE, POC    Collection Time: 01/12/23  7:34 AM   Result Value Ref Range    Glucose (POC) 142 (H) 65 - 100 mg/dL    Performed by [address], POC    Collection Time: 01/12/23 11:13 AM   Result Value Ref Range    Glucose (POC) 118 (H) 65 - 100 mg/dL    Performed by Brooklyn Menjivar      [unfilled]      Review of Systems    Constitutional: Negative for chills and fever. HENT: Negative. Eyes: Negative. Respiratory: Negative. Cardiovascular: Negative. Gastrointestinal: Negative for abdominal pain and nausea. Skin: Negative. Neurological: Negative. Physical Exam:      Constitutional: pt is oriented to person, place, and time. HENT:   Head: Normocephalic and atraumatic. Eyes: Pupils are equal, round, and reactive to light. EOM are normal.   Cardiovascular: Normal rate, regular rhythm and normal heart sounds. Pulmonary/Chest: Breath sounds normal. No wheezes. No rales. Exhibits no tenderness. Abdominal: Soft. Bowel sounds are normal. There is no abdominal tenderness. There is no rebound and no guarding. Musculoskeletal: Normal range of motion.    Neurological: pt is alert and oriented to person, place, and time. XR ABD (KUB)   Final Result      No visible small bowel obstruction at this time. Decreased mild colonic   constipation pattern. Persistent left lung base atelectasis versus pneumonia. Recommend PA and lateral chest views. XR ABD (KUB)   Final Result   Nasogastric tube over the stomach. Large amount colonic stool. No definite   change allowing for technical differences compared to CT. CTA ABD PELV W WO CONT   Final Result   Imaging findings consistent with early/partial small bowel obstruction,   transition point in the left lower quadrant most likely related to adhesions. Please see above for additional nonemergent incidental findings. Recent Results (from the past 24 hour(s))   GLUCOSE, POC    Collection Time: 01/11/23 12:42 PM   Result Value Ref Range    Glucose (POC) 139 (H) 65 - 100 mg/dL    Performed by [facility] [address] [facility], POC    Collection Time: 01/11/23  3:50 PM   Result Value Ref Range    Glucose (POC) 122 (H) 65 - 100 mg/dL    Performed by [facility], POC    Collection Time: 01/11/23  7:59 PM   Result Value Ref Range    Glucose (POC) 119 (H) 65 - 100 mg/dL    Performed by Devin Epps    GLUCOSE, POC    Collection Time: 01/12/23  7:34 AM   Result Value Ref Range    Glucose (POC) 142 (H) 65 - 100 mg/dL    Performed by [address], POC    Collection Time: 01/12/23 11:13 AM   Result Value Ref Range    Glucose (POC) 118 (H) 65 - 100 mg/dL    Performed by Aidee Molina        Results       ** No results found for the last 336 hours.  **             Labs:     Recent Labs     01/09/23 2013   WBC 4.3   HGB 12.3   HCT 39.7          Recent Labs     01/11/23 0853 01/09/23 2013    142   K 3.7 3.8    107   CO2 26 32   BUN 14 19   CREA 0.55 0.63   * 92   CA 10.4* 10.4*       Recent Labs     01/11/23 0853 01/09/23 2013   ALT 18 17    124*   TBILI 0.5 0.4   TP 7.6 7. 5   ALB 3.4* 3.5   GLOB 4.2* 4.0   LPSE  --  141       No results for input(s): INR, PTP, APTT, INREXT, INREXT in the last 72 hours. No results for input(s): FE, TIBC, PSAT, FERR in the last 72 hours. No results found for: FOL, RBCF   No results for input(s): PH, PCO2, PO2 in the last 72 hours. No results for input(s): CPK, CKNDX, TROIQ in the last 72 hours. No lab exists for component: CPKMB  No results found for: CHOL, CHOLX, CHLST, CHOLV, HDL, HDLP, LDL, LDLC, DLDLP, TGLX, TRIGL, TRIGP, CHHD, CHHDX  Lab Results   Component Value Date/Time    Glucose (POC) 118 (H) 01/12/2023 11:13 AM    Glucose (POC) 142 (H) 01/12/2023 07:34 AM    Glucose (POC) 119 (H) 01/11/2023 07:59 PM    Glucose (POC) 122 (H) 01/11/2023 03:50 PM    Glucose (POC) 139 (H) 01/11/2023 12:42 PM     Lab Results   Component Value Date/Time    Color Yellow/Straw 10/05/2021 11:25 AM    Appearance Clear 10/05/2021 11:25 AM    Specific gravity 1.014 10/05/2021 11:25 AM    pH (UA) 7.0 10/05/2021 11:25 AM    Protein Negative 10/05/2021 11:25 AM    Glucose Negative 10/05/2021 11:25 AM    Ketone Negative 10/05/2021 11:25 AM    Bilirubin Negative 10/05/2021 11:25 AM    Urobilinogen 0.1 10/05/2021 11:25 AM    Nitrites Negative 10/05/2021 11:25 AM    Leukocyte Esterase Negative 10/05/2021 11:25 AM    Bacteria Negative 10/05/2021 11:25 AM    WBC 0-4 10/05/2021 11:25 AM    RBC 0-5 10/05/2021 11:25 AM         Assessment:     Small bowel obstruction  Type 2 diabetes  Hypertension  Chronic A.  Fib  Hyperlipidemia  Neuropathy  Atelectasis    Plan:   Eliquis held by GI  Continue n.p.o/NG tube  Continue to monitor labs  If things do not improve over next 24-36 hours will do surgical exploration  Start on nebulizer treatment  And repeat chest x-ray PA lateral view    Order midline placement for drawing labs          Current Facility-Administered Medications:     morphine injection 2 mg, 2 mg, IntraVENous, Q4H PRN, Tess Vega MD, 2 mg at 01/12/23 9365 dextrose 5 % - 0.45% NaCl infusion, 50 mL/hr, IntraVENous, CONTINUOUS, Beverly Cerna MD, Last Rate: 50 mL/hr at 01/11/23 2154, 50 mL/hr at 01/11/23 2154    [Held by provider] apixaban (ELIQUIS) tablet 5 mg, 5 mg, Oral, BID, Tamara Cerna MD    dilTIAZem ER (CARDIZEM CD) capsule 120 mg, 120 mg, Oral, DAILY, Beverly Cerna MD, 120 mg at 01/12/23 1030    insulin lispro (HUMALOG) injection, , SubCUTAneous, AC&HS, Renny Dee MD, 2 Units at 01/12/23 1031    glucose chewable tablet 16 g, 4 Tablet, Oral, PRN, Tamara Cerna MD    glucagon (GLUCAGEN) injection 1 mg, 1 mg, IntraMUSCular, PRN, Tamara Cerna MD    acetaminophen (TYLENOL) tablet 650 mg, 650 mg, Oral, Q6H PRN **OR** acetaminophen (TYLENOL) suppository 650 mg, 650 mg, Rectal, Q6H PRN, Beverly Cerna MD    polyethylene glycol (MIRALAX) packet 17 g, 17 g, Oral, DAILY PRN, eBverly Cerna MD    ondansetron (ZOFRAN ODT) tablet 4 mg, 4 mg, Oral, Q8H PRN **OR** ondansetron (ZOFRAN) injection 4 mg, 4 mg, IntraVENous, Q6H PRN, Beverly Cerna MD    heparin (porcine) injection 5,000 Units, 5,000 Units, SubCUTAneous, Q8H, Beverly Cerna MD, 5,000 Units at 01/12/23 0535    atorvastatin (LIPITOR) tablet 40 mg, 40 mg, Oral, QHS, Beverly Cerna MD    pantoprazole (PROTONIX) 40 mg in 0.9% sodium chloride 10 mL injection, 40 mg, IntraVENous, DAILY, Beverly Cerna MD, 40 mg at 01/12/23 1030    lisinopriL (PRINIVIL, ZESTRIL) tablet 40 mg, 40 mg, Oral, DAILY, Beverly Cerna MD, 40 mg at 01/12/23 1036

## 2023-01-12 NOTE — PROGRESS NOTES
9035: Chart reviewed. Per notes, patient NPO and with NGT. Current Dispo: Home Self-Care with daughter.     CM will continue to follow patient and recs of medical team.

## 2023-01-13 ENCOUNTER — APPOINTMENT (OUTPATIENT)
Dept: CT IMAGING | Age: 73
DRG: 389 | End: 2023-01-13
Attending: FAMILY MEDICINE
Payer: MEDICARE

## 2023-01-13 LAB
ALBUMIN SERPL-MCNC: 3 G/DL (ref 3.5–5)
ALBUMIN/GLOB SERPL: 0.9 (ref 1.1–2.2)
ALP SERPL-CCNC: 96 U/L (ref 45–117)
ALT SERPL-CCNC: 13 U/L (ref 12–78)
ANION GAP SERPL CALC-SCNC: 2 MMOL/L (ref 5–15)
AST SERPL W P-5'-P-CCNC: 8 U/L (ref 15–37)
BILIRUB SERPL-MCNC: 0.4 MG/DL (ref 0.2–1)
BUN SERPL-MCNC: 16 MG/DL (ref 6–20)
BUN/CREAT SERPL: 32 (ref 12–20)
CA-I BLD-MCNC: 10.1 MG/DL (ref 8.5–10.1)
CHLORIDE SERPL-SCNC: 101 MMOL/L (ref 97–108)
CO2 SERPL-SCNC: 36 MMOL/L (ref 21–32)
CREAT SERPL-MCNC: 0.5 MG/DL (ref 0.55–1.02)
ERYTHROCYTE [DISTWIDTH] IN BLOOD BY AUTOMATED COUNT: 12.4 % (ref 11.5–14.5)
GLOBULIN SER CALC-MCNC: 3.4 G/DL (ref 2–4)
GLUCOSE BLD STRIP.AUTO-MCNC: 134 MG/DL (ref 65–100)
GLUCOSE BLD STRIP.AUTO-MCNC: 145 MG/DL (ref 65–100)
GLUCOSE BLD STRIP.AUTO-MCNC: 152 MG/DL (ref 65–100)
GLUCOSE BLD STRIP.AUTO-MCNC: 166 MG/DL (ref 65–100)
GLUCOSE BLD STRIP.AUTO-MCNC: 173 MG/DL (ref 65–100)
GLUCOSE SERPL-MCNC: 155 MG/DL (ref 65–100)
HCT VFR BLD AUTO: 38.2 % (ref 35–47)
HGB BLD-MCNC: 11.4 G/DL (ref 11.5–16)
MCH RBC QN AUTO: 27.2 PG (ref 26–34)
MCHC RBC AUTO-ENTMCNC: 29.8 G/DL (ref 30–36.5)
MCV RBC AUTO: 91.2 FL (ref 80–99)
NRBC # BLD: 0 K/UL (ref 0–0.01)
NRBC BLD-RTO: 0 PER 100 WBC
PERFORMED BY, TECHID: ABNORMAL
PLATELET # BLD AUTO: 181 K/UL (ref 150–400)
PMV BLD AUTO: 11.6 FL (ref 8.9–12.9)
POTASSIUM SERPL-SCNC: 3.6 MMOL/L (ref 3.5–5.1)
PROT SERPL-MCNC: 6.4 G/DL (ref 6.4–8.2)
RBC # BLD AUTO: 4.19 M/UL (ref 3.8–5.2)
SODIUM SERPL-SCNC: 139 MMOL/L (ref 136–145)
WBC # BLD AUTO: 9.7 K/UL (ref 3.6–11)

## 2023-01-13 PROCEDURE — 74011250636 HC RX REV CODE- 250/636: Performed by: FAMILY MEDICINE

## 2023-01-13 PROCEDURE — 85027 COMPLETE CBC AUTOMATED: CPT

## 2023-01-13 PROCEDURE — 74011000250 HC RX REV CODE- 250: Performed by: FAMILY MEDICINE

## 2023-01-13 PROCEDURE — 99232 SBSQ HOSP IP/OBS MODERATE 35: CPT | Performed by: COLON & RECTAL SURGERY

## 2023-01-13 PROCEDURE — 80053 COMPREHEN METABOLIC PANEL: CPT

## 2023-01-13 PROCEDURE — 36415 COLL VENOUS BLD VENIPUNCTURE: CPT

## 2023-01-13 PROCEDURE — 94640 AIRWAY INHALATION TREATMENT: CPT

## 2023-01-13 PROCEDURE — C9113 INJ PANTOPRAZOLE SODIUM, VIA: HCPCS | Performed by: FAMILY MEDICINE

## 2023-01-13 PROCEDURE — 65270000029 HC RM PRIVATE

## 2023-01-13 PROCEDURE — 74011250637 HC RX REV CODE- 250/637: Performed by: FAMILY MEDICINE

## 2023-01-13 PROCEDURE — 70450 CT HEAD/BRAIN W/O DYE: CPT

## 2023-01-13 PROCEDURE — 82962 GLUCOSE BLOOD TEST: CPT

## 2023-01-13 RX ORDER — IPRATROPIUM BROMIDE AND ALBUTEROL SULFATE 2.5; .5 MG/3ML; MG/3ML
3 SOLUTION RESPIRATORY (INHALATION)
Status: DISCONTINUED | OUTPATIENT
Start: 2023-01-14 | End: 2023-01-14

## 2023-01-13 RX ADMIN — LISINOPRIL 40 MG: 40 TABLET ORAL at 10:23

## 2023-01-13 RX ADMIN — IPRATROPIUM BROMIDE AND ALBUTEROL SULFATE 3 ML: 2.5; .5 SOLUTION RESPIRATORY (INHALATION) at 19:04

## 2023-01-13 RX ADMIN — IPRATROPIUM BROMIDE AND ALBUTEROL SULFATE 3 ML: 2.5; .5 SOLUTION RESPIRATORY (INHALATION) at 03:24

## 2023-01-13 RX ADMIN — IPRATROPIUM BROMIDE AND ALBUTEROL SULFATE 3 ML: 2.5; .5 SOLUTION RESPIRATORY (INHALATION) at 13:37

## 2023-01-13 RX ADMIN — DILTIAZEM HYDROCHLORIDE 120 MG: 120 CAPSULE, COATED, EXTENDED RELEASE ORAL at 10:23

## 2023-01-13 RX ADMIN — HEPARIN SODIUM 5000 UNITS: 5000 INJECTION INTRAVENOUS; SUBCUTANEOUS at 21:24

## 2023-01-13 RX ADMIN — SODIUM CHLORIDE, PRESERVATIVE FREE 40 MG: 5 INJECTION INTRAVENOUS at 10:22

## 2023-01-13 RX ADMIN — HEPARIN SODIUM 5000 UNITS: 5000 INJECTION INTRAVENOUS; SUBCUTANEOUS at 06:02

## 2023-01-13 RX ADMIN — ACETAMINOPHEN 650 MG: 325 TABLET ORAL at 22:57

## 2023-01-13 RX ADMIN — HEPARIN SODIUM 5000 UNITS: 5000 INJECTION INTRAVENOUS; SUBCUTANEOUS at 14:03

## 2023-01-13 RX ADMIN — IPRATROPIUM BROMIDE AND ALBUTEROL SULFATE 3 ML: 2.5; .5 SOLUTION RESPIRATORY (INHALATION) at 07:32

## 2023-01-13 RX ADMIN — DEXTROSE AND SODIUM CHLORIDE 50 ML/HR: 5; 450 INJECTION, SOLUTION INTRAVENOUS at 12:17

## 2023-01-13 NOTE — MED STUDENT NOTES
General Daily Progress Note          Patient Name:   Lisa Warren       YOB: 1950       Age:  67 y.o. Admit Date: 1/9/2023      Subjective:     Patient is a 67y.o. year old female with significant past medical history of type 2 diabetes hypertension chronic A. fib hyperlipidemia came to emergency room complaining of abdominal pain for almost 3 days no vomiting for 3 days getting normal worse no nausea no vomiting no fever no chills came to emergency room seen by the ER physician     CT scan of the abdomen done which shows  Imaging findings consistent with early/partial small bowel obstruction,  transition point in the left lower quadrant most likely related to adhesions. Please see above for additional nonemergent incidental findings     Patient was recommend to be admitted for further evaluation treatment     When I saw the patient patient still have complaining a lot of abdominal pain  I ordered NG tube placement  And surgical consult     1/11  Patient seen and examined in room today. She continues to complain of mild abdominal pain. She also has NG tube in place. Denies bowel movement. Abdominal KUB done this morning shows large volume stool in the colon. 1/12  Patient seen and examined in room today. She complains of pain on her left side of face. Denies any numbness or tingling. Brief neuro exam showed no evidence of stroke. She is also reporting shortness of breath. Per nurse at bedside her sats went below 88% this morning so she was put on 2L nasal cannula. Patient is very lethargic today. Soap evelyn enema done yesterday. Patient tolerated well without complaints and 5-6 small round pieces were noted to watery stool. Patient denies any abdominal pain today. Lab techs are having difficulty sticking her so we have limited lab info on chart. X ray of abdomen done this morning shows no visible small bowel obstruction at this time. Decreased mild colonic constipation pattern. Persistent left lung base atelectasis versus pneumonia. 1/13  Patient seen at the bedside. She reports feeling better than yesterday. Soap evelyn enema was done yesterday and she had watery stool. NG tube has been removed and the patient is now on clear liquid diet. She will be working on ambulation and PT has been ordered. Objective:     Visit Vitals  /66 (BP 1 Location: Left upper arm, BP Patient Position: At rest;Sitting)   Pulse 98   Temp 99.3 °F (37.4 °C)   Resp 18   Ht 5' 4\" (1.626 m)   Wt 98.9 kg (218 lb)   SpO2 91%   Breastfeeding No   BMI 37.42 kg/m²        Recent Results (from the past 24 hour(s))   GLUCOSE, POC    Collection Time: 01/12/23 11:13 AM   Result Value Ref Range    Glucose (POC) 118 (H) 65 - 100 mg/dL    Performed by Fabienne Orourke    CBC WITH AUTOMATED DIFF    Collection Time: 01/12/23 11:30 AM   Result Value Ref Range    WBC 7.9 3.6 - 11.0 K/uL    RBC 4.39 3.80 - 5.20 M/uL    HGB 12.0 11.5 - 16.0 g/dL    HCT 39.5 35.0 - 47.0 %    MCV 90.0 80.0 - 99.0 FL    MCH 27.3 26.0 - 34.0 PG    MCHC 30.4 30.0 - 36.5 g/dL    RDW 12.4 11.5 - 14.5 %    PLATELET 466 812 - 385 K/uL    MPV 10.9 8.9 - 12.9 FL    NRBC 0.0 0.0  WBC    ABSOLUTE NRBC 0.00 0.00 - 0.01 K/uL    NEUTROPHILS 74 32 - 75 %    LYMPHOCYTES 16 12 - 49 %    MONOCYTES 10 5 - 13 %    EOSINOPHILS 0 0 - 7 %    BASOPHILS 0 0 - 1 %    IMMATURE GRANULOCYTES 0 0 - 0.5 %    ABS. NEUTROPHILS 5.8 1.8 - 8.0 K/UL    ABS. LYMPHOCYTES 1.3 0.8 - 3.5 K/UL    ABS. MONOCYTES 0.8 0.0 - 1.0 K/UL    ABS. EOSINOPHILS 0.0 0.0 - 0.4 K/UL    ABS. BASOPHILS 0.0 0.0 - 0.1 K/UL    ABS. IMM.  GRANS. 0.0 0.00 - 0.04 K/UL    DF AUTOMATED     METABOLIC PANEL, COMPREHENSIVE    Collection Time: 01/12/23 11:30 AM   Result Value Ref Range    Sodium 140 136 - 145 mmol/L    Potassium 3.4 (L) 3.5 - 5.1 mmol/L    Chloride 102 97 - 108 mmol/L    CO2 35 (H) 21 - 32 mmol/L    Anion gap 3 (L) 5 - 15 mmol/L    Glucose 134 (H) 65 - 100 mg/dL    BUN 12 6 - 20 mg/dL Creatinine 0.50 (L) 0.55 - 1.02 mg/dL    BUN/Creatinine ratio 24 (H) 12 - 20      eGFR >60 >60 ml/min/1.73m2    Calcium 10.1 8.5 - 10.1 mg/dL    Bilirubin, total 0.4 0.2 - 1.0 mg/dL    AST (SGOT) 11 (L) 15 - 37 U/L    ALT (SGPT) 16 12 - 78 U/L    Alk. phosphatase 92 45 - 117 U/L    Protein, total 7.0 6.4 - 8.2 g/dL    Albumin 3.0 (L) 3.5 - 5.0 g/dL    Globulin 4.0 2.0 - 4.0 g/dL    A-G Ratio 0.8 (L) 1.1 - 2.2     GLUCOSE, POC    Collection Time: 01/12/23  5:30 PM   Result Value Ref Range    Glucose (POC) 123 (H) 65 - 100 mg/dL    Performed by Abdulaziz Jones, POC    Collection Time: 01/12/23  7:50 PM   Result Value Ref Range    Glucose (POC) 148 (H) 65 - 100 mg/dL    Performed by Meeta Joel    CBC W/O DIFF    Collection Time: 01/13/23  4:52 AM   Result Value Ref Range    WBC 9.7 3.6 - 11.0 K/uL    RBC 4.19 3.80 - 5.20 M/uL    HGB 11.4 (L) 11.5 - 16.0 g/dL    HCT 38.2 35.0 - 47.0 %    MCV 91.2 80.0 - 99.0 FL    MCH 27.2 26.0 - 34.0 PG    MCHC 29.8 (L) 30.0 - 36.5 g/dL    RDW 12.4 11.5 - 14.5 %    PLATELET 243 868 - 785 K/uL    MPV 11.6 8.9 - 12.9 FL    NRBC 0.0 0.0  WBC    ABSOLUTE NRBC 0.00 0.00 - 0.59 K/uL   METABOLIC PANEL, COMPREHENSIVE    Collection Time: 01/13/23  4:52 AM   Result Value Ref Range    Sodium 139 136 - 145 mmol/L    Potassium 3.6 3.5 - 5.1 mmol/L    Chloride 101 97 - 108 mmol/L    CO2 36 (H) 21 - 32 mmol/L    Anion gap 2 (L) 5 - 15 mmol/L    Glucose 155 (H) 65 - 100 mg/dL    BUN 16 6 - 20 mg/dL    Creatinine 0.50 (L) 0.55 - 1.02 mg/dL    BUN/Creatinine ratio 32 (H) 12 - 20      eGFR >60 >60 ml/min/1.73m2    Calcium 10.1 8.5 - 10.1 mg/dL    Bilirubin, total 0.4 0.2 - 1.0 mg/dL    AST (SGOT) 8 (L) 15 - 37 U/L    ALT (SGPT) 13 12 - 78 U/L    Alk.  phosphatase 96 45 - 117 U/L    Protein, total 6.4 6.4 - 8.2 g/dL    Albumin 3.0 (L) 3.5 - 5.0 g/dL    Globulin 3.4 2.0 - 4.0 g/dL    A-G Ratio 0.9 (L) 1.1 - 2.2     GLUCOSE, POC    Collection Time: 01/13/23  7:31 AM   Result Value Ref Range    Glucose (POC) 152 (H) 65 - 100 mg/dL    Performed by Jeevan Callahan      [unfilled]      Review of Systems    Constitutional: Negative for chills and fever. HENT: Negative. Eyes: Negative. Respiratory: Negative. Cardiovascular: Negative. Gastrointestinal: Negative for abdominal pain and nausea. Skin: Negative. Neurological: Negative. Physical Exam:      Constitutional: pt is oriented to person, place, and time. HENT:   Head: Normocephalic and atraumatic. Eyes: Pupils are equal, round, and reactive to light. EOM are normal.   Cardiovascular: Normal rate, regular rhythm and normal heart sounds. Pulmonary/Chest: Breath sounds normal. No wheezes. No rales. Exhibits no tenderness. Abdominal: Soft. Bowel sounds are normal. There is no abdominal tenderness. There is no rebound and no guarding. Musculoskeletal: Normal range of motion. Neurological: pt is alert and oriented to person, place, and time. XR CHEST PA LAT   Final Result   Bibasilar airspace disease. XR ABD (KUB)   Final Result      No visible small bowel obstruction at this time. Decreased mild colonic   constipation pattern. Persistent left lung base atelectasis versus pneumonia. Recommend PA and lateral chest views. XR ABD (KUB)   Final Result   Nasogastric tube over the stomach. Large amount colonic stool. No definite   change allowing for technical differences compared to CT. CTA ABD PELV W WO CONT   Final Result   Imaging findings consistent with early/partial small bowel obstruction,   transition point in the left lower quadrant most likely related to adhesions. Please see above for additional nonemergent incidental findings.                  Recent Results (from the past 24 hour(s))   GLUCOSE, POC    Collection Time: 01/12/23 11:13 AM   Result Value Ref Range    Glucose (POC) 118 (H) 65 - 100 mg/dL    Performed by Martha Pretty    CBC WITH AUTOMATED DIFF    Collection Time: 01/12/23 11:30 AM   Result Value Ref Range    WBC 7.9 3.6 - 11.0 K/uL    RBC 4.39 3.80 - 5.20 M/uL    HGB 12.0 11.5 - 16.0 g/dL    HCT 39.5 35.0 - 47.0 %    MCV 90.0 80.0 - 99.0 FL    MCH 27.3 26.0 - 34.0 PG    MCHC 30.4 30.0 - 36.5 g/dL    RDW 12.4 11.5 - 14.5 %    PLATELET 008 101 - 217 K/uL    MPV 10.9 8.9 - 12.9 FL    NRBC 0.0 0.0  WBC    ABSOLUTE NRBC 0.00 0.00 - 0.01 K/uL    NEUTROPHILS 74 32 - 75 %    LYMPHOCYTES 16 12 - 49 %    MONOCYTES 10 5 - 13 %    EOSINOPHILS 0 0 - 7 %    BASOPHILS 0 0 - 1 %    IMMATURE GRANULOCYTES 0 0 - 0.5 %    ABS. NEUTROPHILS 5.8 1.8 - 8.0 K/UL    ABS. LYMPHOCYTES 1.3 0.8 - 3.5 K/UL    ABS. MONOCYTES 0.8 0.0 - 1.0 K/UL    ABS. EOSINOPHILS 0.0 0.0 - 0.4 K/UL    ABS. BASOPHILS 0.0 0.0 - 0.1 K/UL    ABS. IMM. GRANS. 0.0 0.00 - 0.04 K/UL    DF AUTOMATED     METABOLIC PANEL, COMPREHENSIVE    Collection Time: 01/12/23 11:30 AM   Result Value Ref Range    Sodium 140 136 - 145 mmol/L    Potassium 3.4 (L) 3.5 - 5.1 mmol/L    Chloride 102 97 - 108 mmol/L    CO2 35 (H) 21 - 32 mmol/L    Anion gap 3 (L) 5 - 15 mmol/L    Glucose 134 (H) 65 - 100 mg/dL    BUN 12 6 - 20 mg/dL    Creatinine 0.50 (L) 0.55 - 1.02 mg/dL    BUN/Creatinine ratio 24 (H) 12 - 20      eGFR >60 >60 ml/min/1.73m2    Calcium 10.1 8.5 - 10.1 mg/dL    Bilirubin, total 0.4 0.2 - 1.0 mg/dL    AST (SGOT) 11 (L) 15 - 37 U/L    ALT (SGPT) 16 12 - 78 U/L    Alk.  phosphatase 92 45 - 117 U/L    Protein, total 7.0 6.4 - 8.2 g/dL    Albumin 3.0 (L) 3.5 - 5.0 g/dL    Globulin 4.0 2.0 - 4.0 g/dL    A-G Ratio 0.8 (L) 1.1 - 2.2     GLUCOSE, POC    Collection Time: 01/12/23  5:30 PM   Result Value Ref Range    Glucose (POC) 123 (H) 65 - 100 mg/dL    Performed by Abdulazzi JIMENEZ [facility], POC    Collection Time: 01/12/23  7:50 PM   Result Value Ref Range    Glucose (POC) 148 (H) 65 - 100 mg/dL    Performed by Spencer Ordoñez    CBC W/O DIFF    Collection Time: 01/13/23  4:52 AM   Result Value Ref Range    WBC 9.7 3.6 - 11.0 K/uL    RBC 4.19 3.80 - 5.20 M/uL    HGB 11.4 (L) 11.5 - 16.0 g/dL    HCT 38.2 35.0 - 47.0 %    MCV 91.2 80.0 - 99.0 FL    MCH 27.2 26.0 - 34.0 PG    MCHC 29.8 (L) 30.0 - 36.5 g/dL    RDW 12.4 11.5 - 14.5 %    PLATELET 689 741 - 061 K/uL    MPV 11.6 8.9 - 12.9 FL    NRBC 0.0 0.0  WBC    ABSOLUTE NRBC 0.00 0.00 - 5.09 K/uL   METABOLIC PANEL, COMPREHENSIVE    Collection Time: 01/13/23  4:52 AM   Result Value Ref Range    Sodium 139 136 - 145 mmol/L    Potassium 3.6 3.5 - 5.1 mmol/L    Chloride 101 97 - 108 mmol/L    CO2 36 (H) 21 - 32 mmol/L    Anion gap 2 (L) 5 - 15 mmol/L    Glucose 155 (H) 65 - 100 mg/dL    BUN 16 6 - 20 mg/dL    Creatinine 0.50 (L) 0.55 - 1.02 mg/dL    BUN/Creatinine ratio 32 (H) 12 - 20      eGFR >60 >60 ml/min/1.73m2    Calcium 10.1 8.5 - 10.1 mg/dL    Bilirubin, total 0.4 0.2 - 1.0 mg/dL    AST (SGOT) 8 (L) 15 - 37 U/L    ALT (SGPT) 13 12 - 78 U/L    Alk. phosphatase 96 45 - 117 U/L    Protein, total 6.4 6.4 - 8.2 g/dL    Albumin 3.0 (L) 3.5 - 5.0 g/dL    Globulin 3.4 2.0 - 4.0 g/dL    A-G Ratio 0.9 (L) 1.1 - 2.2     GLUCOSE, POC    Collection Time: 01/13/23  7:31 AM   Result Value Ref Range    Glucose (POC) 152 (H) 65 - 100 mg/dL    Performed by Ronen Sams        Results       ** No results found for the last 336 hours. **             Labs:     Recent Labs     01/13/23  0452 01/12/23  1130   WBC 9.7 7.9   HGB 11.4* 12.0   HCT 38.2 39.5    164       Recent Labs     01/13/23  0452 01/12/23  1130 01/11/23  0853    140 138   K 3.6 3.4* 3.7    102 105   CO2 36* 35* 26   BUN 16 12 14   CREA 0.50* 0.50* 0.55   * 134* 118*   CA 10.1 10.1 10.4*       Recent Labs     01/13/23  0452 01/12/23  1130 01/11/23  0853   ALT 13 16 18   AP 96 92 108   TBILI 0.4 0.4 0.5   TP 6.4 7.0 7.6   ALB 3.0* 3.0* 3.4*   GLOB 3.4 4.0 4.2*       No results for input(s): INR, PTP, APTT, INREXT, INREXT in the last 72 hours.    No results for input(s): FE, TIBC, PSAT, FERR in the last 72 hours. No results found for: FOL, RBCF   No results for input(s): PH, PCO2, PO2 in the last 72 hours. No results for input(s): CPK, CKNDX, TROIQ in the last 72 hours. No lab exists for component: CPKMB  No results found for: CHOL, CHOLX, CHLST, CHOLV, HDL, HDLP, LDL, LDLC, DLDLP, TGLX, TRIGL, TRIGP, CHHD, CHHDX  Lab Results   Component Value Date/Time    Glucose (POC) 152 (H) 01/13/2023 07:31 AM    Glucose (POC) 148 (H) 01/12/2023 07:50 PM    Glucose (POC) 123 (H) 01/12/2023 05:30 PM    Glucose (POC) 118 (H) 01/12/2023 11:13 AM    Glucose (POC) 142 (H) 01/12/2023 07:34 AM     Lab Results   Component Value Date/Time    Color Yellow/Straw 10/05/2021 11:25 AM    Appearance Clear 10/05/2021 11:25 AM    Specific gravity 1.014 10/05/2021 11:25 AM    pH (UA) 7.0 10/05/2021 11:25 AM    Protein Negative 10/05/2021 11:25 AM    Glucose Negative 10/05/2021 11:25 AM    Ketone Negative 10/05/2021 11:25 AM    Bilirubin Negative 10/05/2021 11:25 AM    Urobilinogen 0.1 10/05/2021 11:25 AM    Nitrites Negative 10/05/2021 11:25 AM    Leukocyte Esterase Negative 10/05/2021 11:25 AM    Bacteria Negative 10/05/2021 11:25 AM    WBC 0-4 10/05/2021 11:25 AM    RBC 0-5 10/05/2021 11:25 AM         Assessment:     Small bowel obstruction, improved  Type 2 diabetes  Hypertension  Chronic A.  Fib  Hyperlipidemia  Neuropathy  Atelectasis    Plan:   Eliquis held by GI  Continue to monitor labs  Pt on clear liquid diet    And repeat chest x-ray PA lateral view    PT consult pending          Current Facility-Administered Medications:     albuterol-ipratropium (DUO-NEB) 2.5 MG-0.5 MG/3 ML, 3 mL, Nebulization, Q6H RT, Beverly Cerna MD, 3 mL at 01/13/23 0732    hydrALAZINE (APRESOLINE) 20 mg/mL injection 10 mg, 10 mg, IntraVENous, Q6H PRN, Beverly Cerna MD    morphine injection 2 mg, 2 mg, IntraVENous, Q4H PRN, Sherice Serna MD, 2 mg at 01/12/23 1030    dextrose 5 % - 0.45% NaCl infusion, 50 mL/hr, IntraVENous, CONTINUOUS, Eryn Carlos Garcia MD, Last Rate: 50 mL/hr at 01/12/23 1359, 50 mL/hr at 01/12/23 1359    [Held by provider] apixaban (ELIQUIS) tablet 5 mg, 5 mg, Oral, BID, Carlos Cerna MD    dilTIAZem ER (CARDIZEM CD) capsule 120 mg, 120 mg, Oral, DAILY, Beverly Cerna MD, 120 mg at 01/12/23 1030    insulin lispro (HUMALOG) injection, , SubCUTAneous, AC&HS, Benjie Thakkar MD, 2 Units at 01/12/23 1031    glucose chewable tablet 16 g, 4 Tablet, Oral, PRN, Carlos Cerna MD    glucagon (GLUCAGEN) injection 1 mg, 1 mg, IntraMUSCular, PRN, Carlos Cerna MD    acetaminophen (TYLENOL) tablet 650 mg, 650 mg, Oral, Q6H PRN **OR** acetaminophen (TYLENOL) suppository 650 mg, 650 mg, Rectal, Q6H PRN, Beverly Cerna MD    polyethylene glycol (MIRALAX) packet 17 g, 17 g, Oral, DAILY PRN, Beverly Cerna MD    ondansetron (ZOFRAN ODT) tablet 4 mg, 4 mg, Oral, Q8H PRN **OR** ondansetron (ZOFRAN) injection 4 mg, 4 mg, IntraVENous, Q6H PRN, Beverly Cerna MD    heparin (porcine) injection 5,000 Units, 5,000 Units, SubCUTAneous, Q8H, Beverly Cerna MD, 5,000 Units at 01/13/23 0602    atorvastatin (LIPITOR) tablet 40 mg, 40 mg, Oral, QHS, Beverly Cerna MD    pantoprazole (PROTONIX) 40 mg in 0.9% sodium chloride 10 mL injection, 40 mg, IntraVENous, DAILY, Beverly Cerna MD, 40 mg at 01/12/23 1030    lisinopriL (PRINIVIL, ZESTRIL) tablet 40 mg, 40 mg, Oral, DAILY, Beverly Cerna MD, 40 mg at 01/12/23 1030

## 2023-01-13 NOTE — MED STUDENT NOTES
General Daily Progress Note          Patient Name:   Adali Fiore       YOB: 1950       Age:  67 y.o. Admit Date: 1/9/2023      Subjective:     Patient is a 67y.o. year old female with significant past medical history of type 2 diabetes hypertension chronic A. fib hyperlipidemia came to emergency room complaining of abdominal pain for almost 3 days no vomiting for 3 days getting normal worse no nausea no vomiting no fever no chills came to emergency room seen by the ER physician     CT scan of the abdomen done which shows  Imaging findings consistent with early/partial small bowel obstruction,  transition point in the left lower quadrant most likely related to adhesions. Please see above for additional nonemergent incidental findings     Patient was recommend to be admitted for further evaluation treatment     When I saw the patient patient still have complaining a lot of abdominal pain  I ordered NG tube placement  And surgical consult     1/11  Patient seen and examined in room today. She continues to complain of mild abdominal pain. She also has NG tube in place. Denies bowel movement. Abdominal KUB done this morning shows large volume stool in the colon. 1/12  Patient seen and examined in room today. She complains of pain on her left side of face. Denies any numbness or tingling. Brief neuro exam showed no evidence of stroke. She is also reporting shortness of breath. Per nurse at bedside her sats went below 88% this morning so she was put on 2L nasal cannula. Patient is very lethargic today. Soap evelyn enema done yesterday. Patient tolerated well without complaints and 5-6 small round pieces were noted to watery stool. Patient denies any abdominal pain today. Lab techs are having difficulty sticking her so we have limited lab info on chart. X ray of abdomen done this morning shows no visible small bowel obstruction at this time. Decreased mild colonic constipation pattern. Persistent left lung base atelectasis versus pneumonia. 1/13  Patient seen at the bedside. She reports feeling better than yesterday. Soap evelyn enema was done yesterday and she had watery stool. NG tube has been removed and the patient is now on clear liquid diet. She will be working on ambulation and PT has been ordered. Objective:     Visit Vitals  /66 (BP 1 Location: Left upper arm, BP Patient Position: At rest;Sitting)   Pulse 98   Temp 99.3 °F (37.4 °C)   Resp 18   Ht 5' 4\" (1.626 m)   Wt 98.9 kg (218 lb)   SpO2 91%   Breastfeeding No   BMI 37.42 kg/m²        Recent Results (from the past 24 hour(s))   GLUCOSE, POC    Collection Time: 01/12/23  5:30 PM   Result Value Ref Range    Glucose (POC) 123 (H) 65 - 100 mg/dL    Performed by [address], POC    Collection Time: 01/12/23  7:50 PM   Result Value Ref Range    Glucose (POC) 148 (H) 65 - 100 mg/dL    Performed by Elizabeth Plasencia    CBC W/O DIFF    Collection Time: 01/13/23  4:52 AM   Result Value Ref Range    WBC 9.7 3.6 - 11.0 K/uL    RBC 4.19 3.80 - 5.20 M/uL    HGB 11.4 (L) 11.5 - 16.0 g/dL    HCT 38.2 35.0 - 47.0 %    MCV 91.2 80.0 - 99.0 FL    MCH 27.2 26.0 - 34.0 PG    MCHC 29.8 (L) 30.0 - 36.5 g/dL    RDW 12.4 11.5 - 14.5 %    PLATELET 483 876 - 554 K/uL    MPV 11.6 8.9 - 12.9 FL    NRBC 0.0 0.0  WBC    ABSOLUTE NRBC 0.00 0.00 - 8.45 K/uL   METABOLIC PANEL, COMPREHENSIVE    Collection Time: 01/13/23  4:52 AM   Result Value Ref Range    Sodium 139 136 - 145 mmol/L    Potassium 3.6 3.5 - 5.1 mmol/L    Chloride 101 97 - 108 mmol/L    CO2 36 (H) 21 - 32 mmol/L    Anion gap 2 (L) 5 - 15 mmol/L    Glucose 155 (H) 65 - 100 mg/dL    BUN 16 6 - 20 mg/dL    Creatinine 0.50 (L) 0.55 - 1.02 mg/dL    BUN/Creatinine ratio 32 (H) 12 - 20      eGFR >60 >60 ml/min/1.73m2    Calcium 10.1 8.5 - 10.1 mg/dL    Bilirubin, total 0.4 0.2 - 1.0 mg/dL    AST (SGOT) 8 (L) 15 - 37 U/L    ALT (SGPT) 13 12 - 78 U/L    Alk.  phosphatase 96 45 - 117 U/L    Protein, total 6.4 6.4 - 8.2 g/dL    Albumin 3.0 (L) 3.5 - 5.0 g/dL    Globulin 3.4 2.0 - 4.0 g/dL    A-G Ratio 0.9 (L) 1.1 - 2.2     GLUCOSE, POC    Collection Time: 01/13/23  7:31 AM   Result Value Ref Range    Glucose (POC) 152 (H) 65 - 100 mg/dL    Performed by [facility], POC    Collection Time: 01/13/23 11:52 AM   Result Value Ref Range    Glucose (POC) 134 (H) 65 - 100 mg/dL    Performed by Garcia Salgado      [unfilled]      Review of Systems    Constitutional: Negative for chills and fever. HENT: Negative. Eyes: Negative. Respiratory: Negative. Cardiovascular: Negative. Gastrointestinal: Negative for abdominal pain and nausea. Skin: Negative. Neurological: Negative. Physical Exam:      Constitutional: pt is oriented to person, place, and time. HENT:   Head: Normocephalic and atraumatic. Eyes: Pupils are equal, round, and reactive to light. EOM are normal.   Cardiovascular: Normal rate, regular rhythm and normal heart sounds. Pulmonary/Chest: Breath sounds normal. No wheezes. No rales. Exhibits no tenderness. Abdominal: Soft. Bowel sounds are normal. There is no abdominal tenderness. There is no rebound and no guarding. Musculoskeletal: Normal range of motion. Neurological: pt is alert and oriented to person, place, and time. XR CHEST PA LAT   Final Result   Bibasilar airspace disease. XR ABD (KUB)   Final Result      No visible small bowel obstruction at this time. Decreased mild colonic   constipation pattern. Persistent left lung base atelectasis versus pneumonia. Recommend PA and lateral chest views. XR ABD (KUB)   Final Result   Nasogastric tube over the stomach. Large amount colonic stool. No definite   change allowing for technical differences compared to CT.          CTA ABD PELV W WO CONT   Final Result   Imaging findings consistent with early/partial small bowel obstruction,   transition point in the left lower quadrant most likely related to adhesions. Please see above for additional nonemergent incidental findings. Recent Results (from the past 24 hour(s))   GLUCOSE, POC    Collection Time: 01/12/23  5:30 PM   Result Value Ref Range    Glucose (POC) 123 (H) 65 - 100 mg/dL    Performed by [address], POC    Collection Time: 01/12/23  7:50 PM   Result Value Ref Range    Glucose (POC) 148 (H) 65 - 100 mg/dL    Performed by Corona Dumont    CBC W/O DIFF    Collection Time: 01/13/23  4:52 AM   Result Value Ref Range    WBC 9.7 3.6 - 11.0 K/uL    RBC 4.19 3.80 - 5.20 M/uL    HGB 11.4 (L) 11.5 - 16.0 g/dL    HCT 38.2 35.0 - 47.0 %    MCV 91.2 80.0 - 99.0 FL    MCH 27.2 26.0 - 34.0 PG    MCHC 29.8 (L) 30.0 - 36.5 g/dL    RDW 12.4 11.5 - 14.5 %    PLATELET 913 025 - 652 K/uL    MPV 11.6 8.9 - 12.9 FL    NRBC 0.0 0.0  WBC    ABSOLUTE NRBC 0.00 0.00 - 9.57 K/uL   METABOLIC PANEL, COMPREHENSIVE    Collection Time: 01/13/23  4:52 AM   Result Value Ref Range    Sodium 139 136 - 145 mmol/L    Potassium 3.6 3.5 - 5.1 mmol/L    Chloride 101 97 - 108 mmol/L    CO2 36 (H) 21 - 32 mmol/L    Anion gap 2 (L) 5 - 15 mmol/L    Glucose 155 (H) 65 - 100 mg/dL    BUN 16 6 - 20 mg/dL    Creatinine 0.50 (L) 0.55 - 1.02 mg/dL    BUN/Creatinine ratio 32 (H) 12 - 20      eGFR >60 >60 ml/min/1.73m2    Calcium 10.1 8.5 - 10.1 mg/dL    Bilirubin, total 0.4 0.2 - 1.0 mg/dL    AST (SGOT) 8 (L) 15 - 37 U/L    ALT (SGPT) 13 12 - 78 U/L    Alk.  phosphatase 96 45 - 117 U/L    Protein, total 6.4 6.4 - 8.2 g/dL    Albumin 3.0 (L) 3.5 - 5.0 g/dL    Globulin 3.4 2.0 - 4.0 g/dL    A-G Ratio 0.9 (L) 1.1 - 2.2     GLUCOSE, POC    Collection Time: 01/13/23  7:31 AM   Result Value Ref Range    Glucose (POC) 152 (H) 65 - 100 mg/dL    Performed by [address], POC    Collection Time: 01/13/23 11:52 AM   Result Value Ref Range    Glucose (POC) 134 (H) 65 - 100 mg/dL    Performed by Russ Jones ** No results found for the last 336 hours. **             Labs:     Recent Labs     01/13/23  0452 01/12/23  1130   WBC 9.7 7.9   HGB 11.4* 12.0   HCT 38.2 39.5    164       Recent Labs     01/13/23  0452 01/12/23  1130 01/11/23  0853    140 138   K 3.6 3.4* 3.7    102 105   CO2 36* 35* 26   BUN 16 12 14   CREA 0.50* 0.50* 0.55   * 134* 118*   CA 10.1 10.1 10.4*       Recent Labs     01/13/23  0452 01/12/23  1130 01/11/23  0853   ALT 13 16 18   AP 96 92 108   TBILI 0.4 0.4 0.5   TP 6.4 7.0 7.6   ALB 3.0* 3.0* 3.4*   GLOB 3.4 4.0 4.2*       No results for input(s): INR, PTP, APTT, INREXT, INREXT in the last 72 hours. No results for input(s): FE, TIBC, PSAT, FERR in the last 72 hours. No results found for: FOL, RBCF   No results for input(s): PH, PCO2, PO2 in the last 72 hours. No results for input(s): CPK, CKNDX, TROIQ in the last 72 hours.     No lab exists for component: CPKMB  No results found for: CHOL, CHOLX, CHLST, CHOLV, HDL, HDLP, LDL, LDLC, DLDLP, TGLX, TRIGL, TRIGP, CHHD, CHHDX  Lab Results   Component Value Date/Time    Glucose (POC) 134 (H) 01/13/2023 11:52 AM    Glucose (POC) 152 (H) 01/13/2023 07:31 AM    Glucose (POC) 148 (H) 01/12/2023 07:50 PM    Glucose (POC) 123 (H) 01/12/2023 05:30 PM    Glucose (POC) 118 (H) 01/12/2023 11:13 AM     Lab Results   Component Value Date/Time    Color Yellow/Straw 10/05/2021 11:25 AM    Appearance Clear 10/05/2021 11:25 AM    Specific gravity 1.014 10/05/2021 11:25 AM    pH (UA) 7.0 10/05/2021 11:25 AM    Protein Negative 10/05/2021 11:25 AM    Glucose Negative 10/05/2021 11:25 AM    Ketone Negative 10/05/2021 11:25 AM    Bilirubin Negative 10/05/2021 11:25 AM    Urobilinogen 0.1 10/05/2021 11:25 AM    Nitrites Negative 10/05/2021 11:25 AM    Leukocyte Esterase Negative 10/05/2021 11:25 AM    Bacteria Negative 10/05/2021 11:25 AM    WBC 0-4 10/05/2021 11:25 AM    RBC 0-5 10/05/2021 11:25 AM         Assessment:     Small bowel obstruction, improved  Type 2 diabetes  Hypertension  Chronic A.  Fib  Hyperlipidemia  Neuropathy  Atelectasis    Plan:   Eliquis held by GI  Continue to monitor labs  Pt on clear liquid diet        PT consult pending          Current Facility-Administered Medications:     albuterol-ipratropium (DUO-NEB) 2.5 MG-0.5 MG/3 ML, 3 mL, Nebulization, Q6H RT, Beverly Cerna MD, 3 mL at 01/13/23 0732    hydrALAZINE (APRESOLINE) 20 mg/mL injection 10 mg, 10 mg, IntraVENous, Q6H PRN, Beverly Cerna MD    morphine injection 2 mg, 2 mg, IntraVENous, Q4H PRN, Jesenia Garibay MD, 2 mg at 01/12/23 1030    dextrose 5 % - 0.45% NaCl infusion, 50 mL/hr, IntraVENous, CONTINUOUS, Beverly Cerna MD, Last Rate: 50 mL/hr at 01/12/23 1359, 50 mL/hr at 01/12/23 1359    [Held by provider] apixaban (ELIQUIS) tablet 5 mg, 5 mg, Oral, BID, Aliyah Cerna MD    dilTIAZem ER (CARDIZEM CD) capsule 120 mg, 120 mg, Oral, DAILY, Beverly Cerna MD, 120 mg at 01/13/23 1023    insulin lispro (HUMALOG) injection, , SubCUTAneous, AC&HS, Abram Ferro MD, 2 Units at 01/12/23 1031    glucose chewable tablet 16 g, 4 Tablet, Oral, PRN, Aliyah Cerna MD    glucagon (GLUCAGEN) injection 1 mg, 1 mg, IntraMUSCular, PRN, Aliyah Cerna MD    acetaminophen (TYLENOL) tablet 650 mg, 650 mg, Oral, Q6H PRN **OR** acetaminophen (TYLENOL) suppository 650 mg, 650 mg, Rectal, Q6H PRN, Beverly Cerna MD    polyethylene glycol (MIRALAX) packet 17 g, 17 g, Oral, DAILY PRN, Beverly Cerna MD    ondansetron (ZOFRAN ODT) tablet 4 mg, 4 mg, Oral, Q8H PRN **OR** ondansetron (ZOFRAN) injection 4 mg, 4 mg, IntraVENous, Q6H PRN, Beverly Cerna MD    heparin (porcine) injection 5,000 Units, 5,000 Units, SubCUTAneous, Q8H, Beverly Cerna MD, 5,000 Units at 01/13/23 0602    atorvastatin (LIPITOR) tablet 40 mg, 40 mg, Oral, QHS, Beverly Cerna MD    pantoprazole (PROTONIX) 40 mg in 0.9% sodium chloride 10 mL injection, 40 mg, IntraVENous, DAILY, Michael Hernandez MD, 40 mg at 01/13/23 1022    lisinopriL (PRINIVIL, ZESTRIL) tablet 40 mg, 40 mg, Oral, DAILY, Beverly Cerna MD, 40 mg at 01/13/23 1023

## 2023-01-13 NOTE — PROGRESS NOTES
Problem: Patient Education: Go to Patient Education Activity  Goal: Patient/Family Education  Outcome: Progressing Towards Goal     Problem: Falls - Risk of  Goal: *Absence of Falls  Description: Document Margaret All Fall Risk and appropriate interventions in the flowsheet. Outcome: Progressing Towards Goal  Note: Fall Risk Interventions:  Mobility Interventions: Bed/chair exit alarm         Medication Interventions: Bed/chair exit alarm    Elimination Interventions: Call light in reach, Bed/chair exit alarm              Problem: Patient Education: Go to Patient Education Activity  Goal: Patient/Family Education  Outcome: Progressing Towards Goal     Problem: Pain  Goal: *Control of Pain  Outcome: Progressing Towards Goal  Goal: *PALLIATIVE CARE:  Alleviation of Pain  Outcome: Progressing Towards Goal     Problem: Patient Education: Go to Patient Education Activity  Goal: Patient/Family Education  Outcome: Progressing Towards Goal     Problem: Pressure Injury - Risk of  Goal: *Prevention of pressure injury  Description: Document Nestor Scale and appropriate interventions in the flowsheet.   Outcome: Progressing Towards Goal  Note: Pressure Injury Interventions:  Sensory Interventions: Assess changes in LOC    Moisture Interventions: Absorbent underpads, Apply protective barrier, creams and emollients    Activity Interventions: Increase time out of bed    Mobility Interventions: HOB 30 degrees or less    Nutrition Interventions: Document food/fluid/supplement intake, Offer support with meals,snacks and hydration    Friction and Shear Interventions: HOB 30 degrees or less                Problem: Patient Education: Go to Patient Education Activity  Goal: Patient/Family Education  Outcome: Progressing Towards Goal

## 2023-01-13 NOTE — PROGRESS NOTES
N G tube taken out, patient tolerated well, will continue to encourage clear liquids and record patient's progress.

## 2023-01-13 NOTE — CONSULTS
Nutrition Education      Educated on [facility], General Healthful Diet. Learners: Patient and Family  Readiness: Eager  Method: Explanation and Handout  Response: Verbalizes Understanding and Demonstrated Understanding  Contact name provided. Pt's family concerned about appropriate foods to consume for prevention of SBOs and high fiber foods. RD reviewed nutrition handout on High Fiber foods and clarified questions/concerns for discharge. Pt and family aware education is pending GI MD's clearance for solid PO diet(currently on Clear Liquid diet). Also, briefly educated on consistent Vitamin K w/ use of anticoagulants. Celia Odom RD  Contact Number: ext. [phone].

## 2023-01-13 NOTE — PROGRESS NOTES
4445: Chart reviewed. Per notes; NGT discontinued, diet advanced to clear liquids and PT ordered. Current Dispo: Home Self-Care with Daughter.     CM will continue to follow patient and recs of medical team.

## 2023-01-13 NOTE — PROGRESS NOTES
Progress Note    Patient: July Sequeira MRN: [id number]  SSN: [id number]    YOB: 1950  Age: 67 y.o. Sex: female      Admit Date: 1/9/2023    LOS: 4 days     Subjective:   Patient seen in follow-up of partial small bowel obstruction  NG tube was clamped all day yesterday, nurse states that there was no residuals noted  Patient states her abdominal pain is improved  Is passing flatus    Objective:     Vitals:    01/12/23 2032 01/12/23 2343 01/13/23 0323 01/13/23 0325   BP:  133/66     Pulse:  98     Resp:  18     Temp:  99.3 °F (37.4 °C)     SpO2: 93% 94% (!) 88% 91%   Weight:       Height:            Intake and Output:  Current Shift: No intake/output data recorded. Last three shifts: 01/11 1901 - 01/13 0700  In: -   Out: 425 [Urine:425]    Review of Systems:  ROS     Physical Exam:  Abdomen is soft, nondistended, minimally tender to palpation    Lab/Data Review:  Recent Results (from the past 24 hour(s))   GLUCOSE, POC    Collection Time: 01/12/23  7:34 AM   Result Value Ref Range    Glucose (POC) 142 (H) 65 - 100 mg/dL    Performed by [address], POC    Collection Time: 01/12/23 11:13 AM   Result Value Ref Range    Glucose (POC) 118 (H) 65 - 100 mg/dL    Performed by Martha Pretty    CBC WITH AUTOMATED DIFF    Collection Time: 01/12/23 11:30 AM   Result Value Ref Range    WBC 7.9 3.6 - 11.0 K/uL    RBC 4.39 3.80 - 5.20 M/uL    HGB 12.0 11.5 - 16.0 g/dL    HCT 39.5 35.0 - 47.0 %    MCV 90.0 80.0 - 99.0 FL    MCH 27.3 26.0 - 34.0 PG    MCHC 30.4 30.0 - 36.5 g/dL    RDW 12.4 11.5 - 14.5 %    PLATELET 815 268 - 663 K/uL    MPV 10.9 8.9 - 12.9 FL    NRBC 0.0 0.0  WBC    ABSOLUTE NRBC 0.00 0.00 - 0.01 K/uL    NEUTROPHILS 74 32 - 75 %    LYMPHOCYTES 16 12 - 49 %    MONOCYTES 10 5 - 13 %    EOSINOPHILS 0 0 - 7 %    BASOPHILS 0 0 - 1 %    IMMATURE GRANULOCYTES 0 0 - 0.5 %    ABS. NEUTROPHILS 5.8 1.8 - 8.0 K/UL    ABS. LYMPHOCYTES 1.3 0.8 - 3.5 K/UL    ABS.  MONOCYTES 0.8 0.0 - 1.0 K/UL    ABS. EOSINOPHILS 0.0 0.0 - 0.4 K/UL    ABS. BASOPHILS 0.0 0.0 - 0.1 K/UL    ABS. IMM. GRANS. 0.0 0.00 - 0.04 K/UL    DF AUTOMATED     METABOLIC PANEL, COMPREHENSIVE    Collection Time: 01/12/23 11:30 AM   Result Value Ref Range    Sodium 140 136 - 145 mmol/L    Potassium 3.4 (L) 3.5 - 5.1 mmol/L    Chloride 102 97 - 108 mmol/L    CO2 35 (H) 21 - 32 mmol/L    Anion gap 3 (L) 5 - 15 mmol/L    Glucose 134 (H) 65 - 100 mg/dL    BUN 12 6 - 20 mg/dL    Creatinine 0.50 (L) 0.55 - 1.02 mg/dL    BUN/Creatinine ratio 24 (H) 12 - 20      eGFR >60 >60 ml/min/1.73m2    Calcium 10.1 8.5 - 10.1 mg/dL    Bilirubin, total 0.4 0.2 - 1.0 mg/dL    AST (SGOT) 11 (L) 15 - 37 U/L    ALT (SGPT) 16 12 - 78 U/L    Alk.  phosphatase 92 45 - 117 U/L    Protein, total 7.0 6.4 - 8.2 g/dL    Albumin 3.0 (L) 3.5 - 5.0 g/dL    Globulin 4.0 2.0 - 4.0 g/dL    A-G Ratio 0.8 (L) 1.1 - 2.2     GLUCOSE, POC    Collection Time: 01/12/23  5:30 PM   Result Value Ref Range    Glucose (POC) 123 (H) 65 - 100 mg/dL    Performed by [address] , POC    Collection Time: 01/12/23  7:50 PM   Result Value Ref Range    Glucose (POC) 148 (H) 65 - 100 mg/dL    Performed by Shy Byers    CBC W/O DIFF    Collection Time: 01/13/23  4:52 AM   Result Value Ref Range    WBC 9.7 3.6 - 11.0 K/uL    RBC 4.19 3.80 - 5.20 M/uL    HGB 11.4 (L) 11.5 - 16.0 g/dL    HCT 38.2 35.0 - 47.0 %    MCV 91.2 80.0 - 99.0 FL    MCH 27.2 26.0 - 34.0 PG    MCHC 29.8 (L) 30.0 - 36.5 g/dL    RDW 12.4 11.5 - 14.5 %    PLATELET 116 052 - 975 K/uL    MPV 11.6 8.9 - 12.9 FL    NRBC 0.0 0.0  WBC    ABSOLUTE NRBC 0.00 0.00 - 2.99 K/uL   METABOLIC PANEL, COMPREHENSIVE    Collection Time: 01/13/23  4:52 AM   Result Value Ref Range    Sodium 139 136 - 145 mmol/L    Potassium 3.6 3.5 - 5.1 mmol/L    Chloride 101 97 - 108 mmol/L    CO2 36 (H) 21 - 32 mmol/L    Anion gap 2 (L) 5 - 15 mmol/L    Glucose 155 (H) 65 - 100 mg/dL    BUN 16 6 - 20 mg/dL    Creatinine 0.50 (L) 0.55 - 1.02 mg/dL    BUN/Creatinine ratio 32 (H) 12 - 20      eGFR >60 >60 ml/min/1.73m2    Calcium 10.1 8.5 - 10.1 mg/dL    Bilirubin, total 0.4 0.2 - 1.0 mg/dL    AST (SGOT) 8 (L) 15 - 37 U/L    ALT (SGPT) 13 12 - 78 U/L    Alk.  phosphatase 96 45 - 117 U/L    Protein, total 6.4 6.4 - 8.2 g/dL    Albumin 3.0 (L) 3.5 - 5.0 g/dL    Globulin 3.4 2.0 - 4.0 g/dL    A-G Ratio 0.9 (L) 1.1 - 2.2            Assessment:     Active Problems:    Partial small bowel obstruction (HCC) (1/9/2023)      [facility] (1/10/2023)        Plan:   Small bowel obstruction clinically resolving  Discontinue nasogastric tube  Start clear liquid diet  Out of bed ambulation  Physical therapy to see patient    Signed By: Sara Mario MD     January 13, 2023

## 2023-01-14 ENCOUNTER — APPOINTMENT (OUTPATIENT)
Dept: CT IMAGING | Age: 73
DRG: 389 | End: 2023-01-14
Attending: FAMILY MEDICINE
Payer: MEDICARE

## 2023-01-14 ENCOUNTER — APPOINTMENT (OUTPATIENT)
Dept: GENERAL RADIOLOGY | Age: 73
DRG: 389 | End: 2023-01-14
Attending: COLON & RECTAL SURGERY
Payer: MEDICARE

## 2023-01-14 LAB
GLUCOSE BLD STRIP.AUTO-MCNC: 104 MG/DL (ref 65–100)
GLUCOSE BLD STRIP.AUTO-MCNC: 142 MG/DL (ref 65–100)
GLUCOSE BLD STRIP.AUTO-MCNC: 94 MG/DL (ref 65–100)
GLUCOSE BLD STRIP.AUTO-MCNC: 96 MG/DL (ref 65–100)
PERFORMED BY, TECHID: ABNORMAL
PERFORMED BY, TECHID: ABNORMAL
PERFORMED BY, TECHID: NORMAL
PERFORMED BY, TECHID: NORMAL

## 2023-01-14 PROCEDURE — 74011000250 HC RX REV CODE- 250: Performed by: FAMILY MEDICINE

## 2023-01-14 PROCEDURE — 74011000636 HC RX REV CODE- 636: Performed by: FAMILY MEDICINE

## 2023-01-14 PROCEDURE — 74011250636 HC RX REV CODE- 250/636: Performed by: FAMILY MEDICINE

## 2023-01-14 PROCEDURE — 99232 SBSQ HOSP IP/OBS MODERATE 35: CPT | Performed by: COLON & RECTAL SURGERY

## 2023-01-14 PROCEDURE — 74018 RADEX ABDOMEN 1 VIEW: CPT

## 2023-01-14 PROCEDURE — 65270000029 HC RM PRIVATE

## 2023-01-14 PROCEDURE — 71045 X-RAY EXAM CHEST 1 VIEW: CPT

## 2023-01-14 PROCEDURE — 92610 EVALUATE SWALLOWING FUNCTION: CPT

## 2023-01-14 PROCEDURE — 82962 GLUCOSE BLOOD TEST: CPT

## 2023-01-14 PROCEDURE — 77010033678 HC OXYGEN DAILY

## 2023-01-14 PROCEDURE — 94761 N-INVAS EAR/PLS OXIMETRY MLT: CPT

## 2023-01-14 PROCEDURE — 97161 PT EVAL LOW COMPLEX 20 MIN: CPT

## 2023-01-14 PROCEDURE — 74176 CT ABD & PELVIS W/O CONTRAST: CPT

## 2023-01-14 PROCEDURE — 94640 AIRWAY INHALATION TREATMENT: CPT

## 2023-01-14 PROCEDURE — 74011250637 HC RX REV CODE- 250/637: Performed by: FAMILY MEDICINE

## 2023-01-14 PROCEDURE — 97530 THERAPEUTIC ACTIVITIES: CPT

## 2023-01-14 PROCEDURE — C9113 INJ PANTOPRAZOLE SODIUM, VIA: HCPCS | Performed by: FAMILY MEDICINE

## 2023-01-14 RX ORDER — GUAIFENESIN 600 MG/1
600 TABLET, EXTENDED RELEASE ORAL 2 TIMES DAILY
Status: DISCONTINUED | OUTPATIENT
Start: 2023-01-14 | End: 2023-01-15

## 2023-01-14 RX ORDER — IPRATROPIUM BROMIDE AND ALBUTEROL SULFATE 2.5; .5 MG/3ML; MG/3ML
3 SOLUTION RESPIRATORY (INHALATION)
Status: DISCONTINUED | OUTPATIENT
Start: 2023-01-14 | End: 2023-01-16 | Stop reason: HOSPADM

## 2023-01-14 RX ADMIN — IPRATROPIUM BROMIDE AND ALBUTEROL SULFATE 3 ML: 2.5; .5 SOLUTION RESPIRATORY (INHALATION) at 20:08

## 2023-01-14 RX ADMIN — HEPARIN SODIUM 5000 UNITS: 5000 INJECTION INTRAVENOUS; SUBCUTANEOUS at 14:28

## 2023-01-14 RX ADMIN — HEPARIN SODIUM 5000 UNITS: 5000 INJECTION INTRAVENOUS; SUBCUTANEOUS at 21:22

## 2023-01-14 RX ADMIN — SODIUM CHLORIDE, PRESERVATIVE FREE 40 MG: 5 INJECTION INTRAVENOUS at 08:18

## 2023-01-14 RX ADMIN — DIATRIZOATE MEGLUMINE AND DIATRIZOATE SODIUM 30 ML: 660; 100 LIQUID ORAL; RECTAL at 14:17

## 2023-01-14 RX ADMIN — Medication 1 LOZENGE: at 10:19

## 2023-01-14 RX ADMIN — IPRATROPIUM BROMIDE AND ALBUTEROL SULFATE 3 ML: 2.5; .5 SOLUTION RESPIRATORY (INHALATION) at 08:17

## 2023-01-14 RX ADMIN — DILTIAZEM HYDROCHLORIDE 120 MG: 120 CAPSULE, COATED, EXTENDED RELEASE ORAL at 08:20

## 2023-01-14 RX ADMIN — LISINOPRIL 40 MG: 40 TABLET ORAL at 08:26

## 2023-01-14 RX ADMIN — HEPARIN SODIUM 5000 UNITS: 5000 INJECTION INTRAVENOUS; SUBCUTANEOUS at 05:40

## 2023-01-14 RX ADMIN — ATORVASTATIN CALCIUM 40 MG: 40 TABLET, FILM COATED ORAL at 21:22

## 2023-01-14 NOTE — PROGRESS NOTES
General Daily Progress Note          Patient Name:   Elizabeth Padilla       YOB: 1950       Age:  67 y.o. Admit Date: 1/9/2023      Subjective:     Patient is a 67y.o. year old female with significant past medical history of type 2 diabetes hypertension chronic A. fib hyperlipidemia came to emergency room complaining of abdominal pain for almost 3 days no vomiting for 3 days getting normal worse no nausea no vomiting no fever no chills came to emergency room seen by the ER physician     CT scan of the abdomen done which shows  Imaging findings consistent with early/partial small bowel obstruction,  transition point in the left lower quadrant most likely related to adhesions. Please see above for additional nonemergent incidental findings     Patient was recommend to be admitted for further evaluation treatment     When I saw the patient patient still have complaining a lot of abdominal pain  I ordered NG tube placement  And surgical consult     1/11  Patient seen and examined in room today. She continues to complain of mild abdominal pain. She also has NG tube in place. Denies bowel movement. Abdominal KUB done this morning shows large volume stool in the colon. 1/12  Patient seen and examined in room today. She complains of pain on her left side of face. Denies any numbness or tingling. Brief neuro exam showed no evidence of stroke. She is also reporting shortness of breath. Per nurse at bedside her sats went below 88% this morning so she was put on 2L nasal cannula. Patient is very lethargic today. Soap evelyn enema done yesterday. Patient tolerated well without complaints and 5-6 small round pieces were noted to watery stool. Patient denies any abdominal pain today. Lab techs are having difficulty sticking her so we have limited lab info on chart. X ray of abdomen done this morning shows no visible small bowel obstruction at this time. Decreased mild colonic constipation pattern. Persistent left lung base atelectasis versus pneumonia. 1/13  Patient seen at the bedside. She reports feeling better than yesterday. Soap evelyn enema was done yesterday and she had watery stool. NG tube has been removed and the patient is now on clear liquid diet. She will be working on ambulation and PT has been ordered. 1/14    Patient have a stroke alert yesterday CT scan of the head was negative patient has no symptoms today      Objective:     Visit Vitals  /82   Pulse 86   Temp 98.6 °F (37 °C)   Resp 18   Ht 5' 4\" (1.626 m)   Wt 98.9 kg (218 lb)   SpO2 100%   Breastfeeding No   BMI 37.42 kg/m²        Recent Results (from the past 24 hour(s))   GLUCOSE, POC    Collection Time: 01/13/23  3:56 PM   Result Value Ref Range    Glucose (POC) 173 (H) 65 - 100 mg/dL    Performed by [address], POC    Collection Time: 01/13/23  7:37 PM   Result Value Ref Range    Glucose (POC) 166 (H) 65 - 100 mg/dL    Performed by [address], POC    Collection Time: 01/13/23  9:39 PM   Result Value Ref Range    Glucose (POC) 145 (H) 65 - 100 mg/dL    Performed by [address], POC    Collection Time: 01/14/23  8:00 AM   Result Value Ref Range    Glucose (POC) 104 (H) 65 - 100 mg/dL    Performed by Leoncio Dozier      [unfilled]      Review of Systems    Constitutional: Negative for chills and fever. HENT: Negative. Eyes: Negative. Respiratory: Negative. Cardiovascular: Negative. Gastrointestinal: Negative for abdominal pain and nausea. Skin: Negative. Neurological: Negative. Physical Exam:      Constitutional: pt is oriented to person, place, and time. HENT:   Head: Normocephalic and atraumatic. Eyes: Pupils are equal, round, and reactive to light. EOM are normal.   Cardiovascular: Normal rate, regular rhythm and normal heart sounds. Pulmonary/Chest: Breath sounds normal. No wheezes. No rales. Exhibits no tenderness. Abdominal: Soft.  Bowel sounds are normal. There is no abdominal tenderness. There is no rebound and no guarding. Musculoskeletal: Normal range of motion. Neurological: pt is alert and oriented to person, place, and time. CT CODE NEURO HEAD WO CONTRAST   Final Result   No acute intracranial abnormality. Right cerebellar chronic infarct. XR CHEST PA LAT   Final Result   Bibasilar airspace disease. XR ABD (KUB)   Final Result      No visible small bowel obstruction at this time. Decreased mild colonic   constipation pattern. Persistent left lung base atelectasis versus pneumonia. Recommend PA and lateral chest views. XR ABD (KUB)   Final Result   Nasogastric tube over the stomach. Large amount colonic stool. No definite   change allowing for technical differences compared to CT. CTA ABD PELV W WO CONT   Final Result   Imaging findings consistent with early/partial small bowel obstruction,   transition point in the left lower quadrant most likely related to adhesions. Please see above for additional nonemergent incidental findings. Recent Results (from the past 24 hour(s))   GLUCOSE, POC    Collection Time: 01/13/23  3:56 PM   Result Value Ref Range    Glucose (POC) 173 (H) 65 - 100 mg/dL    Performed by [address], POC    Collection Time: 01/13/23  7:37 PM   Result Value Ref Range    Glucose (POC) 166 (H) 65 - 100 mg/dL    Performed by [address], POC    Collection Time: 01/13/23  9:39 PM   Result Value Ref Range    Glucose (POC) 145 (H) 65 - 100 mg/dL    Performed by [address], POC    Collection Time: 01/14/23  8:00 AM   Result Value Ref Range    Glucose (POC) 104 (H) 65 - 100 mg/dL    Performed by Susan George        Results       ** No results found for the last 336 hours.  **             Labs:     Recent Labs     01/13/23  0452 01/12/23  1130   WBC 9.7 7.9   HGB 11.4* 12.0   HCT 38.2 39.5    164       Recent Labs 01/13/23  0452 01/12/23  1130    140   K 3.6 3.4*    102   CO2 36* 35*   BUN 16 12   CREA 0.50* 0.50*   * 134*   CA 10.1 10.1       Recent Labs     01/13/23  0452 01/12/23  1130   ALT 13 16   AP 96 92   TBILI 0.4 0.4   TP 6.4 7.0   ALB 3.0* 3.0*   GLOB 3.4 4.0       No results for input(s): INR, PTP, APTT, INREXT, INREXT in the last 72 hours. No results for input(s): FE, TIBC, PSAT, FERR in the last 72 hours. No results found for: FOL, RBCF   No results for input(s): PH, PCO2, PO2 in the last 72 hours. No results for input(s): CPK, CKNDX, TROIQ in the last 72 hours. No lab exists for component: CPKMB  No results found for: CHOL, CHOLX, CHLST, CHOLV, HDL, HDLP, LDL, LDLC, DLDLP, TGLX, TRIGL, TRIGP, CHHD, CHHDX  Lab Results   Component Value Date/Time    Glucose (POC) 104 (H) 01/14/2023 08:00 AM    Glucose (POC) 145 (H) 01/13/2023 09:39 PM    Glucose (POC) 166 (H) 01/13/2023 07:37 PM    Glucose (POC) 173 (H) 01/13/2023 03:56 PM    Glucose (POC) 134 (H) 01/13/2023 11:52 AM     Lab Results   Component Value Date/Time    Color Yellow/Straw 10/05/2021 11:25 AM    Appearance Clear 10/05/2021 11:25 AM    Specific gravity 1.014 10/05/2021 11:25 AM    pH (UA) 7.0 10/05/2021 11:25 AM    Protein Negative 10/05/2021 11:25 AM    Glucose Negative 10/05/2021 11:25 AM    Ketone Negative 10/05/2021 11:25 AM    Bilirubin Negative 10/05/2021 11:25 AM    Urobilinogen 0.1 10/05/2021 11:25 AM    Nitrites Negative 10/05/2021 11:25 AM    Leukocyte Esterase Negative 10/05/2021 11:25 AM    Bacteria Negative 10/05/2021 11:25 AM    WBC 0-4 10/05/2021 11:25 AM    RBC 0-5 10/05/2021 11:25 AM         Assessment:     Small bowel obstruction, improved  Type 2 diabetes  Hypertension  Chronic A.  Fib  Hyperlipidemia  Neuropathy  Atelectasis    Plan:   Eliquis held by GI  Continue to monitor labs  Pt on clear liquid diet    Follow-up with the surgeon  Repeat CT scan of the abdomen and the pelvis    PT consult pending          Current Facility-Administered Medications:     benzocaine-menthoL (CHLORASEPTIC) lozenge 1 Lozenge, 1 Lozenge, Mucous Membrane, PRN, Beverly Cerna MD, 1 Lozenge at 01/14/23 1019    albuterol-ipratropium (DUO-NEB) 2.5 MG-0.5 MG/3 ML, 3 mL, Nebulization, BID RT, Manuela Cerna MD    hydrALAZINE (APRESOLINE) 20 mg/mL injection 10 mg, 10 mg, IntraVENous, Q6H PRN, Beverly Cerna MD    dextrose 5 % - 0.45% NaCl infusion, 50 mL/hr, IntraVENous, CONTINUOUS, Beverly Cerna MD, Last Rate: 50 mL/hr at 01/13/23 1217, 50 mL/hr at 01/13/23 1217    [Held by provider] apixaban (ELIQUIS) tablet 5 mg, 5 mg, Oral, BID, Manuela Cerna MD    dilTIAZem ER (CARDIZEM CD) capsule 120 mg, 120 mg, Oral, DAILY, Beverly Cerna MD, 120 mg at 01/14/23 0820    insulin lispro (HUMALOG) injection, , SubCUTAneous, AC&HS, Beverly Cerna MD, 2 Units at 01/12/23 1031    glucose chewable tablet 16 g, 4 Tablet, Oral, PRN, Manuela Cerna MD    glucagon (GLUCAGEN) injection 1 mg, 1 mg, IntraMUSCular, PRN, Manuela Cerna MD    acetaminophen (TYLENOL) tablet 650 mg, 650 mg, Oral, Q6H PRN, 650 mg at 01/13/23 6657 **OR** acetaminophen (TYLENOL) suppository 650 mg, 650 mg, Rectal, Q6H PRN, Beverly Cerna MD    polyethylene glycol (MIRALAX) packet 17 g, 17 g, Oral, DAILY PRN, Beverly Cerna MD    ondansetron (ZOFRAN ODT) tablet 4 mg, 4 mg, Oral, Q8H PRN **OR** ondansetron (ZOFRAN) injection 4 mg, 4 mg, IntraVENous, Q6H PRN, Beverly Cerna MD    heparin (porcine) injection 5,000 Units, 5,000 Units, SubCUTAneous, Q8H, Beverly Cerna MD, 5,000 Units at 01/14/23 0540    atorvastatin (LIPITOR) tablet 40 mg, 40 mg, Oral, QHS, Beverly Cerna MD    pantoprazole (PROTONIX) 40 mg in 0.9% sodium chloride 10 mL injection, 40 mg, IntraVENous, DAILY, Beverly Cerna MD, 40 mg at 01/14/23 0818    lisinopriL (PRINIVIL, ZESTRIL) tablet 40 mg, 40 mg, Oral, DAILY, Beverly Cerna MD, 40 mg at 01/14/23 4416

## 2023-01-14 NOTE — PROGRESS NOTES
PHYSICAL THERAPY EVALUATION  Patient: Silva Boykin (89 y.o. female)  Date: 1/14/2023  Primary Diagnosis: Partial small bowel obstruction (HCC) [K56.600]  SBO (small bowel obstruction) ([facility] [K56.609]       Precautions: Fall      ASSESSMENT  Pt is a 67 y.o. female admitted on 01/09/2023 for abd pain  ; pt currently being treated for SBO . Pt semi supine upon PT arrival, agreeable to evaluation. Pt A&O x 4. Patient with Hx ofg COVID last year and still recovering  from complication and getting stronger. Based on the objective data described, the patient presents with generalized weakness, impaired functional mobility, impaired amb, impaired balance, and decreased endurance to activities. (See below for objective details and assist levels). Overall pt tolerated session fair today with PT. Pt will benefit from continued skilled PT to address above deficits and return to PLOF. Current PT DC recommendation Home with Home Health Therapy once medically appropriate. Patient lives with daughter and and almost indep IN [place]. uses cane at home  Current Level of Function Impacting Discharge (mobility/balance): decreased gen strength and mobility    Other factors to consider for discharge: HHPT and family assist      PLAN :  Recommendations and Planned Interventions: bed mobility training, transfer training, gait training, therapeutic exercises, patient and family training/education, and therapeutic activities      Recommend for staff: Out of bed to chair for meals, Encourage HEP in prep for ADLs/mobility, Amb to bathroom for toileting with gt belt and AD, Use of bed/chair alarm for safety, and LE elevation for management of edema    Frequency/Duration: Patient will be followed by physical therapy:  3-5x/week to address goals.     Recommendation for discharge: (in order for the patient to meet his/her long term goals)  Home with [address] [place] [address]    This discharge recommendation:  Has been made in collaboration with the attending provider and/or case management    IF patient discharges home will need the following DME: patient owns DME required for discharge         SUBJECTIVE:   Patient stated I am ok.     OBJECTIVE DATA SUMMARY:   HISTORY:    Past Medical History:   Diagnosis Date    Diabetes ([address]     High cholesterol     Hypertension      Past Surgical History:   Procedure Laterality Date    HX HEMORRHOIDECTOMY      HX TONSILLECTOMY      HX WISDOM TEETH EXTRACTION         Home Situation  Home Environment: Private residence  # Steps to Enter: 2  One/Two Story Residence: One story  Living Alone: No  Support Systems: Child(felice)  Patient Expects to be Discharged to[de-identified] Home with home health  Current DME Used/Available at Home: [employer] [place], straight    EXAMINATION/PRESENTATION/DECISION MAKING:   Critical Behavior:  Neurologic State: Alert  Orientation Level: Oriented X4  Cognition: Appropriate decision making     Hearing: Auditory  Auditory Impairment: None  Skin:  intact where visible  Edema: none  Range Of Motion:  AROM: Generally decreased, functional                       Strength:    Strength: Generally decreased, functional                    Tone & Sensation:   Tone: Normal                                 Functional Mobility:  Bed Mobility:  Rolling: Minimum assistance  Supine to Sit: Minimum assistance  Sit to Supine: Minimum assistance  Scooting: Minimum assistance  Transfers:  Sit to Stand: Minimum assistance  Stand to Sit: Minimum assistance                       Balance:   Sitting: Intact; With support  Standing: Intact; With support  Ambulation/Gait Training:  Distance (ft): 3 Feet (ft)  Assistive Device: Gait belt; Other (comment) (HHA)  Ambulation - Level of Assistance: Minimal assistance; Moderate assistance     Gait Description (WDL): Exceptions to [facility]                                           Functional Measure:  [address] [place] [address] [address] AM-PAC 6 Clicks         Basic Mobility Inpatient Short Form  How much difficulty does the patient currently have. .. Unable A Lot A Little None   1. Turning over in bed (including adjusting bedclothes, sheets and blankets)? [] 1   [] 2   [x] 3   [] 4   2. Sitting down on and standing up from a chair with arms ( e.g., wheelchair, bedside commode, etc.)   [] 1   [] 2   [x] 3   [] 4   3. Moving from lying on back to sitting on the side of the bed? [] 1   [] 2   [x] 3   [] 4          How much help from another person does the patient currently need. .. Total A Lot A Little None   4. Moving to and from a bed to a chair (including a wheelchair)? [] 1   [] 2   [x] 3   [] 4   5. Need to walk in hospital room? [] 1   [] 2   [x] 3   [] 4   6. Climbing 3-5 steps with a railing? [] 1   [] 2   [x] 3   [] 4   © , Trustees of [employer] [facility], under license to [employer]. All rights reserved     Score:  Initial:  Most Recent: X (Date: 2023 )   Interpretation of Tool:  Represents activities that are increasingly more difficult (i.e. Bed mobility, Transfers, Gait). Score 24 23 22-20 19-15 14-10 9-7 6   Modifier CH CI CJ CK CL CM CN         Physical Therapy Evaluation Charge Determination   History Examination Presentation Decision-Making   LOW Complexity : Zero comorbidities / personal factors that will impact the outcome / POC LOW Complexity : 1-2 Standardized tests and measures addressing body structure, function, activity limitation and / or participation in recreation  LOW Complexity : Stable, uncomplicated  Other outcome measures [facility] 6        Based on the above components, the patient evaluation is determined to be of the following complexity level: LOW     Pain Ratin/10 0    Activity Tolerance:   Good    After treatment patient left in no apparent distress:   Bed locked and in lowest position Sitting in chair and Call bell within reach .     GOALS:    Problem: Mobility Impaired (Adult and Pediatric)  Goal: *Acute Goals and Plan of Care (Insert Text)  Description: Patient stated goal: get better  Patient will move from supine to sit and sit to supine , scoot up and down, and roll side to side in bed with supervision/set-up within 7 day(s). Patient will transfer from bed to chair and chair to bed with supervision/set-up using the least restrictive device within 7 day(s). Patient will improve static standing balance to minimal assistance within 1 week(s). Patient will ambulate 35 feet with minimal assistance with least restrictive device within 1 weeks. Outcome: Progressing Towards Goal       COMMUNICATION/EDUCATION:   The patients plan of care was discussed with: Registered nurse. Fall prevention education was provided and the patient/caregiver indicated understanding., Patient/family have participated as able in goal setting and plan of care. , and Patient/family agree to work toward stated goals and plan of care.          Thank you for this referral.  Antelmo Batista, PT   Time Calculation: 20 mins Home

## 2023-01-14 NOTE — PROGRESS NOTES
Went into patient's room to complete nightly med rounds. Patient 30 min previously had asked for an extra pillow for her arm due to her IV alarming due to it being in her [facility]. Pillow was noted to be under her arm. No previously A&Ox4. Pleasan, cooperative, and able to follow commands. Daughter has been at bedside and is active in her care. Upon writers arrival during this interaction, patient was noted to be more lethargic. Patient was more difficult to arouse. Patient did open her eyes briefly; however, she would not keep them open nor would she conversant as she did previously. VSS at this time and blood sugar was noted to be 147. Patient running SR on the tele monitor at 87. Patient sat up in bed and after continued queues to open eyes and communicate with nurse, patient noted to be more lethargic and not responding to verbal or painful stimuli. Upon lifting her arms bilaterally, arms noted to fall back to the bed without patient's attempt to control her movement. Patient not responding to sternal rub at this time either. Stroke alert called on patient as daughter states she had complaints of left sided pain earlier today. Patient also noted to have minimal to no hand grasp upon command during neuro assessment. MD notified and orders obtained. Stroke alert called. Orders received. Patient and family educated on the process and what to expected. Will continue to monitor patient and take patient down for CT.

## 2023-01-14 NOTE — PROGRESS NOTES
Problem: Patient Education: Go to Patient Education Activity  Goal: Patient/Family Education  Outcome: Progressing Towards Goal     Problem: Falls - Risk of  Goal: *Absence of Falls  Description: Document Ezequiel Castillo Fall Risk and appropriate interventions in the flowsheet. Outcome: Progressing Towards Goal  Note: Fall Risk Interventions:  Mobility Interventions: Bed/chair exit alarm, Patient to call before getting OOB         Medication Interventions: Bed/chair exit alarm, Patient to call before getting OOB    Elimination Interventions: Bed/chair exit alarm, Call light in reach              Problem: Patient Education: Go to Patient Education Activity  Goal: Patient/Family Education  Outcome: Progressing Towards Goal     Problem: Pain  Goal: *Control of Pain  Outcome: Progressing Towards Goal  Goal: *PALLIATIVE CARE:  Alleviation of Pain  Outcome: Progressing Towards Goal     Problem: Patient Education: Go to Patient Education Activity  Goal: Patient/Family Education  Outcome: Progressing Towards Goal     Problem: Pressure Injury - Risk of  Goal: *Prevention of pressure injury  Description: Document Nestor Scale and appropriate interventions in the flowsheet.   Outcome: Progressing Towards Goal  Note: Pressure Injury Interventions:  Sensory Interventions: Keep linens dry and wrinkle-free, Maintain/enhance activity level    Moisture Interventions: Maintain skin hydration (lotion/cream), Limit adult briefs    Activity Interventions: PT/OT evaluation    Mobility Interventions: PT/OT evaluation, HOB 30 degrees or less, Float heels    Nutrition Interventions: Document food/fluid/supplement intake, Offer support with meals,snacks and hydration    Friction and Shear Interventions: HOB 30 degrees or less, Minimize layers                Problem: Patient Education: Go to Patient Education Activity  Goal: Patient/Family Education  Outcome: Progressing Towards Goal

## 2023-01-14 NOTE — PROGRESS NOTES
General Daily Progress Note          Patient Name:   Cecilio Dailey       YOB: 1950       Age:  67 y.o. Admit Date: 1/9/2023      Subjective:     Patient is a 67y.o. year old female with significant past medical history of type 2 diabetes hypertension chronic A. fib hyperlipidemia came to emergency room complaining of abdominal pain for almost 3 days no vomiting for 3 days getting normal worse no nausea no vomiting no fever no chills came to emergency room seen by the ER physician     CT scan of the abdomen done which shows  Imaging findings consistent with early/partial small bowel obstruction,  transition point in the left lower quadrant most likely related to adhesions. Please see above for additional nonemergent incidental findings     Patient was recommend to be admitted for further evaluation treatment     When I saw the patient patient still have complaining a lot of abdominal pain  I ordered NG tube placement  And surgical consult     1/11  Patient seen and examined in room today. She continues to complain of mild abdominal pain. She also has NG tube in place. Denies bowel movement. Abdominal KUB done this morning shows large volume stool in the colon. 1/12  Patient seen and examined in room today. She complains of pain on her left side of face. Denies any numbness or tingling. Brief neuro exam showed no evidence of stroke. She is also reporting shortness of breath. Per nurse at bedside her sats went below 88% this morning so she was put on 2L nasal cannula. Patient is very lethargic today. Soap evelyn enema done yesterday. Patient tolerated well without complaints and 5-6 small round pieces were noted to watery stool. Patient denies any abdominal pain today. Lab techs are having difficulty sticking her so we have limited lab info on chart. X ray of abdomen done this morning shows no visible small bowel obstruction at this time. Decreased mild colonic constipation pattern. Persistent left lung base atelectasis versus pneumonia. 1/13  Patient seen at the bedside. She reports feeling better than yesterday. Soap evelyn enema was done yesterday and she had watery stool. NG tube has been removed and the patient is now on clear liquid diet. She will be working on ambulation and PT has been ordered. Objective:     Visit Vitals  /82   Pulse 86   Temp 98.6 °F (37 °C)   Resp 18   Ht 5' 4\" (1.626 m)   Wt 98.9 kg (218 lb)   SpO2 100%   Breastfeeding No   BMI 37.42 kg/m²        Recent Results (from the past 24 hour(s))   GLUCOSE, POC    Collection Time: 01/13/23  3:56 PM   Result Value Ref Range    Glucose (POC) 173 (H) 65 - 100 mg/dL    Performed by [address], POC    Collection Time: 01/13/23  7:37 PM   Result Value Ref Range    Glucose (POC) 166 (H) 65 - 100 mg/dL    Performed by [address], POC    Collection Time: 01/13/23  9:39 PM   Result Value Ref Range    Glucose (POC) 145 (H) 65 - 100 mg/dL    Performed by [address], POC    Collection Time: 01/14/23  8:00 AM   Result Value Ref Range    Glucose (POC) 104 (H) 65 - 100 mg/dL    Performed by Russell Craig      [unfilled]      Review of Systems    Constitutional: Negative for chills and fever. HENT: Negative. Eyes: Negative. Respiratory: Negative. Cardiovascular: Negative. Gastrointestinal: Negative for abdominal pain and nausea. Skin: Negative. Neurological: Negative. Physical Exam:      Constitutional: pt is oriented to person, place, and time. HENT:   Head: Normocephalic and atraumatic. Eyes: Pupils are equal, round, and reactive to light. EOM are normal.   Cardiovascular: Normal rate, regular rhythm and normal heart sounds. Pulmonary/Chest: Breath sounds normal. No wheezes. No rales. Exhibits no tenderness. Abdominal: Soft. Bowel sounds are normal. There is no abdominal tenderness. There is no rebound and no guarding.    Musculoskeletal: Normal range of motion. Neurological: pt is alert and oriented to person, place, and time. CT CODE NEURO HEAD WO CONTRAST   Final Result   No acute intracranial abnormality. Right cerebellar chronic infarct. XR CHEST PA LAT   Final Result   Bibasilar airspace disease. XR ABD (KUB)   Final Result      No visible small bowel obstruction at this time. Decreased mild colonic   constipation pattern. Persistent left lung base atelectasis versus pneumonia. Recommend PA and lateral chest views. XR ABD (KUB)   Final Result   Nasogastric tube over the stomach. Large amount colonic stool. No definite   change allowing for technical differences compared to CT. CTA ABD PELV W WO CONT   Final Result   Imaging findings consistent with early/partial small bowel obstruction,   transition point in the left lower quadrant most likely related to adhesions. Please see above for additional nonemergent incidental findings. Recent Results (from the past 24 hour(s))   GLUCOSE, POC    Collection Time: 01/13/23  3:56 PM   Result Value Ref Range    Glucose (POC) 173 (H) 65 - 100 mg/dL    Performed by [address], POC    Collection Time: 01/13/23  7:37 PM   Result Value Ref Range    Glucose (POC) 166 (H) 65 - 100 mg/dL    Performed by [address] [employer] [address], POC    Collection Time: 01/13/23  9:39 PM   Result Value Ref Range    Glucose (POC) 145 (H) 65 - 100 mg/dL    Performed by [address], POC    Collection Time: 01/14/23  8:00 AM   Result Value Ref Range    Glucose (POC) 104 (H) 65 - 100 mg/dL    Performed by Tequila Sonia        Results       ** No results found for the last 336 hours.  **             Labs:     Recent Labs     01/13/23  0452 01/12/23  1130   WBC 9.7 7.9   HGB 11.4* 12.0   HCT 38.2 39.5    164       Recent Labs     01/13/23  0452 01/12/23  1130    140   K 3.6 3.4*    102   CO2 36* 35*   BUN 16 12   CREA 0.50* 0.50*   GLU 155* 134*   CA 10.1 10.1       Recent Labs     01/13/23  0452 01/12/23  1130   ALT 13 16   AP 96 92   TBILI 0.4 0.4   TP 6.4 7.0   ALB 3.0* 3.0*   GLOB 3.4 4.0       No results for input(s): INR, PTP, APTT, INREXT, INREXT in the last 72 hours. No results for input(s): FE, TIBC, PSAT, FERR in the last 72 hours. No results found for: FOL, RBCF   No results for input(s): PH, PCO2, PO2 in the last 72 hours. No results for input(s): CPK, CKNDX, TROIQ in the last 72 hours. No lab exists for component: CPKMB  No results found for: CHOL, CHOLX, CHLST, CHOLV, HDL, HDLP, LDL, LDLC, DLDLP, TGLX, TRIGL, TRIGP, CHHD, CHHDX  Lab Results   Component Value Date/Time    Glucose (POC) 104 (H) 01/14/2023 08:00 AM    Glucose (POC) 145 (H) 01/13/2023 09:39 PM    Glucose (POC) 166 (H) 01/13/2023 07:37 PM    Glucose (POC) 173 (H) 01/13/2023 03:56 PM    Glucose (POC) 134 (H) 01/13/2023 11:52 AM     Lab Results   Component Value Date/Time    Color Yellow/Straw 10/05/2021 11:25 AM    Appearance Clear 10/05/2021 11:25 AM    Specific gravity 1.014 10/05/2021 11:25 AM    pH (UA) 7.0 10/05/2021 11:25 AM    Protein Negative 10/05/2021 11:25 AM    Glucose Negative 10/05/2021 11:25 AM    Ketone Negative 10/05/2021 11:25 AM    Bilirubin Negative 10/05/2021 11:25 AM    Urobilinogen 0.1 10/05/2021 11:25 AM    Nitrites Negative 10/05/2021 11:25 AM    Leukocyte Esterase Negative 10/05/2021 11:25 AM    Bacteria Negative 10/05/2021 11:25 AM    WBC 0-4 10/05/2021 11:25 AM    RBC 0-5 10/05/2021 11:25 AM         Assessment:     Small bowel obstruction, improved  Type 2 diabetes  Hypertension  Chronic A.  Fib  Hyperlipidemia  Neuropathy  Atelectasis    Plan:   Eliquis held by GI  Continue to monitor labs  Pt on clear liquid diet        PT consult pending          Current Facility-Administered Medications:     benzocaine-menthoL (CHLORASEPTIC) lozenge 1 Lozenge, 1 Lozenge, Mucous Membrane, PRN, Beverly Cerna MD, 1 Lozenge at 01/14/23 1011 albuterol-ipratropium (DUO-NEB) 2.5 MG-0.5 MG/3 ML, 3 mL, Nebulization, BID RT, Beverly Cerna MD    hydrALAZINE (APRESOLINE) 20 mg/mL injection 10 mg, 10 mg, IntraVENous, Q6H PRN, Beverly Cerna MD    dextrose 5 % - 0.45% NaCl infusion, 50 mL/hr, IntraVENous, CONTINUOUS, Beverly Cerna MD, Last Rate: 50 mL/hr at 01/13/23 1217, 50 mL/hr at 01/13/23 1217    [Held by provider] apixaban (ELIQUIS) tablet 5 mg, 5 mg, Oral, BID, Carlos Cerna MD    dilTIAZem ER (CARDIZEM CD) capsule 120 mg, 120 mg, Oral, DAILY, Beverly Cerna MD, 120 mg at 01/14/23 0820    insulin lispro (HUMALOG) injection, , SubCUTAneous, AC&HS, Beverly Cerna MD, 2 Units at 01/12/23 1031    glucose chewable tablet 16 g, 4 Tablet, Oral, PRN, Carlos Cerna MD    glucagon (GLUCAGEN) injection 1 mg, 1 mg, IntraMUSCular, PRN, Carlos Cerna MD    acetaminophen (TYLENOL) tablet 650 mg, 650 mg, Oral, Q6H PRN, 650 mg at 01/13/23 2257 **OR** acetaminophen (TYLENOL) suppository 650 mg, 650 mg, Rectal, Q6H PRN, Beverly Cerna MD    polyethylene glycol (MIRALAX) packet 17 g, 17 g, Oral, DAILY PRN, Beverly Cerna MD    ondansetron (ZOFRAN ODT) tablet 4 mg, 4 mg, Oral, Q8H PRN **OR** ondansetron (ZOFRAN) injection 4 mg, 4 mg, IntraVENous, Q6H PRN, Beverly Cerna MD    heparin (porcine) injection 5,000 Units, 5,000 Units, SubCUTAneous, Q8H, Beverly Cerna MD, 5,000 Units at 01/14/23 0540    atorvastatin (LIPITOR) tablet 40 mg, 40 mg, Oral, QHS, Beverly Cerna MD    pantoprazole (PROTONIX) 40 mg in 0.9% sodium chloride 10 mL injection, 40 mg, IntraVENous, DAILY, Beverly Cerna MD, 40 mg at 01/14/23 0818    lisinopriL (PRINIVIL, ZESTRIL) tablet 40 mg, 40 mg, Oral, DAILY, Beverly Cerna MD, 40 mg at 01/14/23 6112

## 2023-01-14 NOTE — PROGRESS NOTES
Stroke alert still active. SOC consulted and live chat with patient and daughters performed. Reggie Barbosa, house supervisor and writer at bedside. At this time patient noted to be coming around more, noted to be more alert and awake. Patient able to talk minimally with her daughters. Patient states she has a HA at this time. 5/10. Patient able to follow commands per the request of the [facility]. Daughters seem relieved and less visible upset. Once patient finished conversation with [facility]. Patient states she doesn't remember the incidents that lead up to the stroke alert being called. Patient interacting more like she was at the beginning of the shift. Patient A&Ox4. Patient passed a bedside swallow eval with thin and thick liquids. Patient able to take Tylenol for her HA. Patient denies nausea at this time. Patient able to stand to the bedside commode just as she was before. Educated patient and daughter on S/S of stroke and when to notify the nurse. Neuro checks at this time are negative. Will continue to monitor no further concerns at this time.

## 2023-01-14 NOTE — PROGRESS NOTES
Patient educated on fall prevention precautions. Patient tayo to discuss. Daughter at bedside and active in care. Will continue to monitor. No further safety concerns at this time.

## 2023-01-14 NOTE — PROGRESS NOTES
Progress Note    Patient: Marielle Sutton MRN: [id number]  SSN: [id number]    YOB: 1950  Age: 67 y.o. Sex: female      Admit Date: 1/9/2023    LOS: 5 days     Subjective:   Patient seen in follow-up of partial small bowel obstruction  Apparently last night became unresponsive briefly and stroke alert called, CT head negative  Patient states she has not passed flatus since yesterday denies bowel movement  Denies nausea vomiting, is tolerating clear liquids    Objective:     Vitals:    01/13/23 2313 01/14/23 0543 01/14/23 0802 01/14/23 0817   BP: 130/68  122/82    Pulse: 66  86    Resp: 18  18    Temp: 98.9 °F (37.2 °C) 98.8 °F (37.1 °C) 98.6 °F (37 °C)    SpO2: 98%  96% 100%   Weight:       Height:            Intake and Output:  Current Shift: No intake/output data recorded. Last three shifts: No intake/output data recorded.     Review of Systems:  ROS     Physical Exam:   Abdomen soft, minimally tender to palpation, nondistended    Lab/Data Review:  Recent Results (from the past 24 hour(s))   GLUCOSE, POC    Collection Time: 01/13/23  3:56 PM   Result Value Ref Range    Glucose (POC) 173 (H) 65 - 100 mg/dL    Performed by [address], POC    Collection Time: 01/13/23  7:37 PM   Result Value Ref Range    Glucose (POC) 166 (H) 65 - 100 mg/dL    Performed by [address], POC    Collection Time: 01/13/23  9:39 PM   Result Value Ref Range    Glucose (POC) 145 (H) 65 - 100 mg/dL    Performed by [address], POC    Collection Time: 01/14/23  8:00 AM   Result Value Ref Range    Glucose (POC) 104 (H) 65 - 100 mg/dL    Performed by Hugh Coffman, POC    Collection Time: 01/14/23 12:04 PM   Result Value Ref Range    Glucose (POC) 96 65 - 100 mg/dL    Performed by Leoncio Dozier           Assessment:     Active Problems:    Partial small bowel obstruction ([address] (1/9/2023)      SBO (small bowel obstruction) ([address] (1/10/2023)        Plan:   KUB abdomen Chest x-ray patient is not having productive cough  Continue clear liquid diet    Signed By: Aiden Shook MD     January 14, 2023

## 2023-01-15 ENCOUNTER — APPOINTMENT (OUTPATIENT)
Dept: GENERAL RADIOLOGY | Age: 73
DRG: 389 | End: 2023-01-15
Attending: FAMILY MEDICINE
Payer: MEDICARE

## 2023-01-15 LAB
GLUCOSE BLD STRIP.AUTO-MCNC: 115 MG/DL (ref 65–100)
GLUCOSE BLD STRIP.AUTO-MCNC: 127 MG/DL (ref 65–100)
GLUCOSE BLD STRIP.AUTO-MCNC: 128 MG/DL (ref 65–100)
GLUCOSE BLD STRIP.AUTO-MCNC: 139 MG/DL (ref 65–100)
PERFORMED BY, TECHID: ABNORMAL

## 2023-01-15 PROCEDURE — 94640 AIRWAY INHALATION TREATMENT: CPT

## 2023-01-15 PROCEDURE — 99232 SBSQ HOSP IP/OBS MODERATE 35: CPT | Performed by: COLON & RECTAL SURGERY

## 2023-01-15 PROCEDURE — 74011250636 HC RX REV CODE- 250/636: Performed by: FAMILY MEDICINE

## 2023-01-15 PROCEDURE — C9113 INJ PANTOPRAZOLE SODIUM, VIA: HCPCS | Performed by: FAMILY MEDICINE

## 2023-01-15 PROCEDURE — 97110 THERAPEUTIC EXERCISES: CPT

## 2023-01-15 PROCEDURE — 74011250637 HC RX REV CODE- 250/637: Performed by: FAMILY MEDICINE

## 2023-01-15 PROCEDURE — 65270000029 HC RM PRIVATE

## 2023-01-15 PROCEDURE — 82962 GLUCOSE BLOOD TEST: CPT

## 2023-01-15 PROCEDURE — 97530 THERAPEUTIC ACTIVITIES: CPT

## 2023-01-15 PROCEDURE — 74011000250 HC RX REV CODE- 250: Performed by: FAMILY MEDICINE

## 2023-01-15 PROCEDURE — 74018 RADEX ABDOMEN 1 VIEW: CPT

## 2023-01-15 RX ORDER — GUAIFENESIN 100 MG/5ML
200 SOLUTION ORAL 2 TIMES DAILY
Status: DISCONTINUED | OUTPATIENT
Start: 2023-01-15 | End: 2023-01-16 | Stop reason: HOSPADM

## 2023-01-15 RX ADMIN — ACETAMINOPHEN 650 MG: 160 SOLUTION ORAL at 20:06

## 2023-01-15 RX ADMIN — HEPARIN SODIUM 5000 UNITS: 5000 INJECTION INTRAVENOUS; SUBCUTANEOUS at 22:44

## 2023-01-15 RX ADMIN — IPRATROPIUM BROMIDE AND ALBUTEROL SULFATE 3 ML: 2.5; .5 SOLUTION RESPIRATORY (INHALATION) at 21:06

## 2023-01-15 RX ADMIN — ACETAMINOPHEN 650 MG: 325 TABLET ORAL at 05:50

## 2023-01-15 RX ADMIN — GUAIFENESIN 200 MG: 200 SOLUTION ORAL at 20:06

## 2023-01-15 RX ADMIN — DILTIAZEM HYDROCHLORIDE 120 MG: 120 CAPSULE, COATED, EXTENDED RELEASE ORAL at 11:22

## 2023-01-15 RX ADMIN — HEPARIN SODIUM 5000 UNITS: 5000 INJECTION INTRAVENOUS; SUBCUTANEOUS at 14:54

## 2023-01-15 RX ADMIN — IPRATROPIUM BROMIDE AND ALBUTEROL SULFATE 3 ML: 2.5; .5 SOLUTION RESPIRATORY (INHALATION) at 09:21

## 2023-01-15 RX ADMIN — HEPARIN SODIUM 5000 UNITS: 5000 INJECTION INTRAVENOUS; SUBCUTANEOUS at 05:50

## 2023-01-15 RX ADMIN — DEXTROSE AND SODIUM CHLORIDE 50 ML/HR: 5; 450 INJECTION, SOLUTION INTRAVENOUS at 05:56

## 2023-01-15 RX ADMIN — Medication 1 LOZENGE: at 06:01

## 2023-01-15 RX ADMIN — SODIUM CHLORIDE, PRESERVATIVE FREE 40 MG: 5 INJECTION INTRAVENOUS at 08:38

## 2023-01-15 RX ADMIN — GUAIFENESIN 200 MG: 200 SOLUTION ORAL at 08:38

## 2023-01-15 RX ADMIN — ACETAMINOPHEN 650 MG: 160 SOLUTION ORAL at 11:30

## 2023-01-15 RX ADMIN — ATORVASTATIN CALCIUM 40 MG: 40 TABLET, FILM COATED ORAL at 22:44

## 2023-01-15 NOTE — PROGRESS NOTES
PHYSICAL THERAPY TREATMENT  Patient: Dre Aguilar (15 y.o. female)  Date: 1/15/2023  Diagnosis: Partial small bowel obstruction (HCC) [K56.600]  SBO (small bowel obstruction) ([facility] [K56.609] <principal problem not specified>      Precautions:    Chart, physical therapy assessment, plan of care and goals were reviewed. ASSESSMENT  Patient continues with skilled PT services and is progressing towards goals. Pt supine upon PT arrival, agreeable to session. (See below for objective details and assist levels). Overall pt tolerated session fair today with PT. Additional time required for all mobility. Pt with generalized fatigue. Decreased assistance required for bed mobility. Performed 1 sit<>stand from EOB, and 1 from chair. Upon both standing trials, pt with c/o \"wooziness. \" /58 t/o tx (checked twice, once before standing and once after standing.) Was able to participate with marching in place ~12 steps. Due to dizziness, withheld further amb distance at this time. Reviewed seated LE therex with pt and family member, will continue to review next tx. Will continue to benefit from skilled PT services, and will continue to progress as tolerated. Current Level of Function Impacting Discharge (mobility/balance): Fair balance. Other factors to consider for discharge: Fall risk. Limited activity tolerance. PLAN :  Patient continues to benefit from skilled intervention to address the above impairments. Continue treatment per established plan of care to address goals. Recommend with staff: Out of bed to chair for meals, Encourage HEP in prep for ADLs/mobility, and Frequent repositioning to prevent skin breakdown    Recommendation for discharge: (in order for the patient to meet his/her long term goals)  IRF v HHPT      IF patient discharges home will need the following DME: rolling walker       SUBJECTIVE:   Patient stated I'm woozy, I think its from those meds.     OBJECTIVE DATA SUMMARY: Critical Behavior:  Neurologic State: Alert, Drowsy  Orientation Level: Oriented X4  Cognition: Follows commands, Decreased attention/concentration     Functional Mobility Training:  Bed Mobility:     Supine to Sit: Stand-by assistance; Additional time     Scooting: Stand-by assistance        Transfers:  Sit to Stand: Contact guard assistance; Additional time  Stand to Sit: Contact guard assistance        Bed to Chair: Minimum assistance; Additional time              Interventions: Verbal cues     Balance:  Sitting: Intact  Standing: Intact; With support  Ambulation/Gait Training:  Distance (ft): 10 Feet (ft)  Assistive Device: Gait belt;Cane, straight  Ambulation - Level of Assistance: Minimal assistance                                               Stairs: Therapeutic Exercises:       EXERCISE   Sets   Reps   Active Active Assist   Passive Self ROM   Comments   Ankle Pumps 1 10 [] [] [] []    Quad Sets/Glut Sets   [] [] [] []    Hamstring Sets   [] [] [] []    Short Arc Quads   [] [] [] []    Heel Slides   [] [] [] []    Straight Leg Raises   [] [] [] []    Hip abd/add 1 10 [] [] [] []    Long Arc Quads 1 10 [] [] [] []    Marching 1 10 [] [] [] []       [] [] [] []       Pain Rating:  No pain reported    Activity Tolerance:   Fair and requires rest breaks    After treatment patient left in no apparent distress:   Bed locked and returned to lowest position, Sitting in chair, Call bell within reach, and Caregiver / family present    GOALS:    Problem: Mobility Impaired (Adult and Pediatric)  Goal: *Acute Goals and Plan of Care (Insert Text)  Description: Patient stated goal: get better  Patient will move from supine to sit and sit to supine , scoot up and down, and roll side to side in bed with supervision/set-up within 7 day(s). Patient will transfer from bed to chair and chair to bed with supervision/set-up using the least restrictive device within 7 day(s).     Patient will improve static standing balance to minimal assistance within 1 week(s). Patient will ambulate 35 feet with minimal assistance with least restrictive device within 1 weeks. Outcome: Progressing Towards Goal       COMMUNICATION/COLLABORATION:   The patients plan of care was discussed with: Physical therapist and Registered nurse. Nsg notified of pt c/o dizziness during tx.      Bayron Sandoval, PT   Time Calculation: 29 mins

## 2023-01-15 NOTE — PROGRESS NOTES
General Daily Progress Note          Patient Name:   Sheyla Ga       YOB: 1950       Age:  67 y.o. Admit Date: 1/9/2023      Subjective:     Patient is a 67y.o. year old female with significant past medical history of type 2 diabetes hypertension chronic A. fib hyperlipidemia came to emergency room complaining of abdominal pain for almost 3 days no vomiting for 3 days getting normal worse no nausea no vomiting no fever no chills came to emergency room seen by the ER physician     CT scan of the abdomen done which shows  Imaging findings consistent with early/partial small bowel obstruction,  transition point in the left lower quadrant most likely related to adhesions. Please see above for additional nonemergent incidental findings     Patient was recommend to be admitted for further evaluation treatment     When I saw the patient patient still have complaining a lot of abdominal pain  I ordered NG tube placement  And surgical consult     1/11  Patient seen and examined in room today. She continues to complain of mild abdominal pain. She also has NG tube in place. Denies bowel movement. Abdominal KUB done this morning shows large volume stool in the colon. 1/12  Patient seen and examined in room today. She complains of pain on her left side of face. Denies any numbness or tingling. Brief neuro exam showed no evidence of stroke. She is also reporting shortness of breath. Per nurse at bedside her sats went below 88% this morning so she was put on 2L nasal cannula. Patient is very lethargic today. Soap evelyn enema done yesterday. Patient tolerated well without complaints and 5-6 small round pieces were noted to watery stool. Patient denies any abdominal pain today. Lab techs are having difficulty sticking her so we have limited lab info on chart. X ray of abdomen done this morning shows no visible small bowel obstruction at this time. Decreased mild colonic constipation pattern. Persistent left lung base atelectasis versus pneumonia. 1/13  Patient seen at the bedside. She reports feeling better than yesterday. Soap evelyn enema was done yesterday and she had watery stool. NG tube has been removed and the patient is now on clear liquid diet. She will be working on ambulation and PT has been ordered. 1/14    Patient have a stroke alert yesterday CT scan of the head was negative patient has no symptoms today  1/15    Complaining of left facial numbness      Objective:     Visit Vitals  BP (!) 114/58   Pulse 72   Temp 98.8 °F (37.1 °C)   Resp (!) 188   Ht 5' 4\" (1.626 m)   Wt 98.9 kg (218 lb)   SpO2 98%   Breastfeeding No   BMI 37.42 kg/m²        Recent Results (from the past 24 hour(s))   GLUCOSE, POC    Collection Time: 01/14/23  4:45 PM   Result Value Ref Range    Glucose (POC) 142 (H) 65 - 100 mg/dL    Performed by Bethany Brooks, POC    Collection Time: 01/14/23  9:06 PM   Result Value Ref Range    Glucose (POC) 94 65 - 100 mg/dL    Performed by Isaiah Mckeon    GLUCOSE, POC    Collection Time: 01/15/23  8:16 AM   Result Value Ref Range    Glucose (POC) 139 (H) 65 - 100 mg/dL    Performed by Bethany Brooks, POC    Collection Time: 01/15/23 11:21 AM   Result Value Ref Range    Glucose (POC) 127 (H) 65 - 100 mg/dL    Performed by Priscilla Beal      [unfilled]      Review of Systems    Constitutional: Negative for chills and fever. HENT: Negative. Eyes: Negative. Respiratory: Negative. Cardiovascular: Negative. Gastrointestinal: Negative for abdominal pain and nausea. Skin: Negative. Neurological: Negative. Physical Exam:      Constitutional: pt is oriented to person, place, and time. HENT:   Head: Normocephalic and atraumatic. Eyes: Pupils are equal, round, and reactive to light. EOM are normal.   Cardiovascular: Normal rate, regular rhythm and normal heart sounds. Pulmonary/Chest: Breath sounds normal. No wheezes. No rales. Exhibits no tenderness. Abdominal: Soft. Bowel sounds are normal. There is no abdominal tenderness. There is no rebound and no guarding. Musculoskeletal: Normal range of motion. Neurological: pt is alert and oriented to person, place, and time. CT ABD PELV WO CONT   Final Result   1. Resolved small bowel obstruction      XR CHEST PORT   Final Result   1. No significant change in the appearance of the chest.                XR ABD (KUB)   Final Result   1. No visible acute process                      CT CODE NEURO HEAD WO CONTRAST   Final Result   No acute intracranial abnormality. Right cerebellar chronic infarct. XR CHEST PA LAT   Final Result   Bibasilar airspace disease. XR ABD (KUB)   Final Result      No visible small bowel obstruction at this time. Decreased mild colonic   constipation pattern. Persistent left lung base atelectasis versus pneumonia. Recommend PA and lateral chest views. XR ABD (KUB)   Final Result   Nasogastric tube over the stomach. Large amount colonic stool. No definite   change allowing for technical differences compared to CT. CTA ABD PELV W WO CONT   Final Result   Imaging findings consistent with early/partial small bowel obstruction,   transition point in the left lower quadrant most likely related to adhesions. Please see above for additional nonemergent incidental findings.             XR ABD (KUB)    (Results Pending)        Recent Results (from the past 24 hour(s))   GLUCOSE, POC    Collection Time: 01/14/23  4:45 PM   Result Value Ref Range    Glucose (POC) 142 (H) 65 - 100 mg/dL    Performed by Marcial Chacon, POC    Collection Time: 01/14/23  9:06 PM   Result Value Ref Range    Glucose (POC) 94 65 - 100 mg/dL    Performed by Brant Redmond    GLUCOSE, POC    Collection Time: 01/15/23  8:16 AM   Result Value Ref Range    Glucose (POC) 139 (H) 65 - 100 mg/dL    Performed by Marcial Chacon, POC    Collection Time: 01/15/23 11:21 AM   Result Value Ref Range    Glucose (POC) 127 (H) 65 - 100 mg/dL    Performed by Mariella Wooten        Results       ** No results found for the last 336 hours. **             Labs:     Recent Labs     01/13/23 0452   WBC 9.7   HGB 11.4*   HCT 38.2          Recent Labs     01/13/23 0452      K 3.6      CO2 36*   BUN 16   CREA 0.50*   *   CA 10.1       Recent Labs     01/13/23 0452   ALT 13   AP 96   TBILI 0.4   TP 6.4   ALB 3.0*   GLOB 3.4       No results for input(s): INR, PTP, APTT, INREXT, INREXT in the last 72 hours. No results for input(s): FE, TIBC, PSAT, FERR in the last 72 hours. No results found for: FOL, RBCF   No results for input(s): PH, PCO2, PO2 in the last 72 hours. No results for input(s): CPK, CKNDX, TROIQ in the last 72 hours. No lab exists for component: CPKMB  No results found for: CHOL, CHOLX, CHLST, CHOLV, HDL, HDLP, LDL, LDLC, DLDLP, TGLX, TRIGL, TRIGP, CHHD, CHHDX  Lab Results   Component Value Date/Time    Glucose (POC) 127 (H) 01/15/2023 11:21 AM    Glucose (POC) 139 (H) 01/15/2023 08:16 AM    Glucose (POC) 94 01/14/2023 09:06 PM    Glucose (POC) 142 (H) 01/14/2023 04:45 PM    Glucose (POC) 96 01/14/2023 12:04 PM     Lab Results   Component Value Date/Time    Color Yellow/Straw 10/05/2021 11:25 AM    Appearance Clear 10/05/2021 11:25 AM    Specific gravity 1.014 10/05/2021 11:25 AM    pH (UA) 7.0 10/05/2021 11:25 AM    Protein Negative 10/05/2021 11:25 AM    Glucose Negative 10/05/2021 11:25 AM    Ketone Negative 10/05/2021 11:25 AM    Bilirubin Negative 10/05/2021 11:25 AM    Urobilinogen 0.1 10/05/2021 11:25 AM    Nitrites Negative 10/05/2021 11:25 AM    Leukocyte Esterase Negative 10/05/2021 11:25 AM    Bacteria Negative 10/05/2021 11:25 AM    WBC 0-4 10/05/2021 11:25 AM    RBC 0-5 10/05/2021 11:25 AM         Assessment:     Small bowel obstruction, improved  Type 2 diabetes  Hypertension  Chronic A. Fib  Hyperlipidemia  Neuropathy  Atelectasis    Plan:   Eliquis held by GI  Continue to monitor labs  Pt on clear liquid diet          PT OT consult  KUB  Neurology consult for left facial numbness          Current Facility-Administered Medications:     acetaminophen (TYLENOL) solution 650 mg, 650 mg, Oral, Q6H PRN, Beverly Cerna MD, 650 mg at 01/15/23 1130    guaiFENesin (ROBITUSSIN) 100 mg/5 mL oral liquid 200 mg, 200 mg, Oral, BID, Beverly Cerna MD, 200 mg at 01/15/23 0838    benzocaine-menthoL (CHLORASEPTIC) lozenge 1 Lozenge, 1 Lozenge, Mucous Membrane, PRN, Kaitlin Saunders MD, 1 Lozenge at 01/15/23 0601    albuterol-ipratropium (DUO-NEB) 2.5 MG-0.5 MG/3 ML, 3 mL, Nebulization, BID RT, Kaitlin Saunders MD, 3 mL at 01/15/23 3781    hydrALAZINE (APRESOLINE) 20 mg/mL injection 10 mg, 10 mg, IntraVENous, Q6H PRN, Beverly Cerna MD    dextrose 5 % - 0.45% NaCl infusion, 50 mL/hr, IntraVENous, CONTINUOUS, Beverly Cerna MD, Last Rate: 50 mL/hr at 01/15/23 0556, 50 mL/hr at 01/15/23 0556    [Held by provider] apixaban (ELIQUIS) tablet 5 mg, 5 mg, Oral, BID, Isai Cerna MD    dilTIAZem ER (CARDIZEM CD) capsule 120 mg, 120 mg, Oral, DAILY, Beverly Cerna MD, 120 mg at 01/15/23 1122    insulin lispro (HUMALOG) injection, , SubCUTAneous, AC&HS, Beverly Cerna MD, 2 Units at 01/12/23 1031    glucose chewable tablet 16 g, 4 Tablet, Oral, PRN, Isai Cerna MD    glucagon (GLUCAGEN) injection 1 mg, 1 mg, IntraMUSCular, PRN, Kaitlin Saunders MD    [DISCONTINUED] acetaminophen (TYLENOL) tablet 650 mg, 650 mg, Oral, Q6H PRN, 650 mg at 01/15/23 0550 **OR** acetaminophen (TYLENOL) suppository 650 mg, 650 mg, Rectal, Q6H PRN, Beverly Cerna MD    polyethylene glycol (MIRALAX) packet 17 g, 17 g, Oral, DAILY PRN, Beverly Cerna MD    ondansetron (ZOFRAN ODT) tablet 4 mg, 4 mg, Oral, Q8H PRN **OR** ondansetron (ZOFRAN) injection 4 mg, 4 mg, IntraVENous, Q6H PRN, Isai Cerna MD    heparin (porcine) injection 5,000 Units, 5,000 Units, SubCUTAneous, Q8H, Beverly Cerna MD, 5,000 Units at 01/15/23 0550    atorvastatin (LIPITOR) tablet 40 mg, 40 mg, Oral, QHS, Beverly Cerna MD, 40 mg at 01/14/23 2122    pantoprazole (PROTONIX) 40 mg in 0.9% sodium chloride 10 mL injection, 40 mg, IntraVENous, DAILY, Beverly Cerna MD, 40 mg at 01/15/23 0838    lisinopriL (PRINIVIL, ZESTRIL) tablet 40 mg, 40 mg, Oral, DAILY, Beverly Cerna MD, 40 mg at 01/14/23 0846

## 2023-01-15 NOTE — PROGRESS NOTES
SPEECH LANGUAGE PATHOLOGY BEDSIDE SWALLOW EVALUATION  Patient: Porsche Sánchez (89 y.o. female)  Date: 1/14/2023  Primary Diagnosis: Partial small bowel obstruction (HCC) [K56.600]  SBO (small bowel obstruction) ([facility] [address] [K56.609]       Precautions: aspiration/GERD       ASSESSMENT :  Patient admitted for SBO/partial SBO. Stroke alert called previous date. Based on the objective data described below, the patient presents with [facility] oropharyngeal swallow function. Results of CXR and CT code neuro reviewed. Per nsg, patient with reduced po intake but tolerating clear liquids well. Patient in bed, drowsy but awakes to verbal cues. Repositioned upright. Patient with aphonic vocal quality, attempts to respond. Patient's daughter present with patient's consent, provides some PMH. Patient's daughter reports patient c/o throat soreness since NGT removed previous date. Nsg aware. Prior to admission, patient on regular diet and thin liquids, tolerated without difficulty. Patient accepts minimal sips/bites of thin liquid and pudding. Appears weak overall. Oral phase: slow manipulation of bolus, adequate bolus control, slow oral transit. Pharyngeal phase: Initiation of swallow is timely; HLE appears mildly reduced, but functional, to digital palpation. Facial grimace noted with each swallow; when asked if due to throat pain, patient nods head. S/sx aspiration/penetration: none observed with trials and consistencies administered. Patient will benefit from skilled intervention to address the above impairments. Patients rehabilitation potential is considered to be good. PLAN :  Recommendations and Planned Interventions:  Patient appears appropriate to continue clear liquid diet. STRICT aspiration and GERD precautions advised. 1:1 assistance with ALL PO intake, monitor pt closely for s/sx aspiration, meds crushed if able in puree, FEED ONLY IF AWAKE AND ALERT.  ST to follow up for diet tolerance, caregiver/patient education, and additional po trials of diet upgrade as cleared by MD. MBS if/as medically indicated. ENT consult if/as indicated. Frequency/Duration: Patient will be followed by speech-language pathology 4 times a week to address goals. Discharge Recommendations: To Be Determined     SUBJECTIVE:   Patient agreeable to participating in evaluation. OBJECTIVE:     CXR Results  (Last 48 hours)                 01/14/23 1424  XR CHEST PORT Final result    Impression:  1. No significant change in the appearance of the chest.                  Narrative:  INDICATION: . productive cough, evaluate for pneumonia   Additional history:   COMPARISON: Previous chest xray, 1/12/2023. LIMITATIONS: Portable technique. Dia Man FINDINGS: Single frontal view of the chest.    .   Lines/tubes/surgical: Cardiac monitor leads overly the patient. Removal of a   gastric tube. Heart/mediastinum: Calcifications in the aortic arch. Lungs/pleura: Low lung volumes. Bibasilar opacities which appear similar to   comparison. No visible pneumothorax. Additional Comments: None. .              CT Results  (Last 48 hours)                 01/14/23 1635  CT ABD PELV WO CONT Final result    Impression:  1. Resolved small bowel obstruction       Narrative:  EXAM: CT ABD PELV WO CONT       INDICATION: abd pain       COMPARISON: 1/14/2023, 1/9/2023       IV CONTRAST: None. ORAL CONTRAST: Yes       TECHNIQUE:    Thin axial images were obtained through the abdomen and pelvis. Coronal and   sagittal reformats were generated. CT dose reduction was achieved through use of   a standardized protocol tailored for this examination and automatic exposure   control for dose modulation. The absence of intravenous contrast material reduces the sensitivity for   evaluation of the vasculature and solid organs. FINDINGS:    LOWER THORAX: Partial collapse of the bilateral lower lobes.  Trace pleural   effusions   LIVER: No mass. BILIARY TREE: Contrast in the gallbladder from previous study CBD is not   dilated. SPLEEN: within normal limits. PANCREAS: No focal abnormality. ADRENALS: Unremarkable. KIDNEYS/URETERS: No calculus or hydronephrosis. STOMACH: Unremarkable. SMALL BOWEL: No dilatation or wall thickening. COLON: Moderate amount of stool within the colon   APPENDIX: Normal   PERITONEUM: No ascites or pneumoperitoneum. RETROPERITONEUM: Vascular calcifications without aneurysm. No adenopathy   REPRODUCTIVE ORGANS: Absent   URINARY BLADDER: Decompressed   BONES: No destructive bone lesion. ABDOMINAL WALL: Postoperative changes anterior abdominal wall   ADDITIONAL COMMENTS: N/A           01/13/23 0091  CT CODE NEURO HEAD WO CONTRAST Final result    Impression:  No acute intracranial abnormality. Right cerebellar chronic infarct. Narrative:  EXAM:  CT CODE NEURO HEAD WO CONTRAST       INDICATION: Stroke alert       COMPARISON: None. TECHNIQUE: Axial noncontrast head CT from foramen magnum to vertex. Coronal and   sagittal reformatted images were obtained. CT dose reduction was achieved   through use of a standardized protocol tailored for this examination and   automatic exposure control for dose modulation. FINDINGS:  There is diffuse age-related parenchymal volume loss. The ventricles   and sulci are age-appropriate without hydrocephalus. There is no mass effect or   midline shift. There is no intracranial hemorrhage or extra-axial fluid   collection. There is no significant white matter disease. There is a chronic   infarct in the right cerebellar hemisphere. There is no other abnormal   parenchymal attenuation. The basal cisterns are patent. The osseous structures are intact. The visualized paranasal sinuses and mastoid   air cells are clear.                     Past Medical History:   Diagnosis Date    Diabetes ([address]     High cholesterol     Hypertension      Past Surgical History:   Procedure Laterality Date    HX HEMORRHOIDECTOMY      HX TONSILLECTOMY      HX WISDOM TEETH EXTRACTION       Prior Level of Function/Home Situation:   Home Situation  Home Environment: Private residence  # Steps to Enter: 2  One/Two Story Residence: One story  Living Alone: No  Support Systems: Child(felice)  Patient Expects to be Discharged to[de-identified] Home with home health  Current DME Used/Available at Home: Chanel Avtar, rolling, Cane, straight  Diet prior to admission: regular/thin per patient's daughter  Current Diet:  clear liquids     Cognitive and Communication Status:  Neurologic State: Alert, Drowsy, Eyes open to voice  Orientation Level: Oriented X4  Cognition: Decreased attention/concentration, Follows commands           Swallowing Evaluation:   Oral Assessment:  Oral Assessment  Labial: No impairment  Dentition: Intact  Oral Hygiene: wfl  Lingual: Other (comment) (unable to protrude s/t throat pain)  Velum: Unable to visualize  P.O. Trials:  Patient Position: upright in bed  Vocal quality prior to P.O.: Aphonic  Consistency Presented: Ice chips; Thin liquid;Pudding  How Presented: SLP-fed/presented;Spoon;Straw;Successive swallows                                                 Voice:                 Vocal Quality: Aphonic                               Pain:  Pain Scale 1: Numeric (0 - 10)  Pain Intensity 1: 0       After treatment:   Patient left in no apparent distress in bed, Call bell within reach, Nursing notified, Caregiver / family present, and Bed / chair alarm activated. COMMUNICATION/EDUCATION:   Patient was educated regarding purpose of SLP assessment, POC, diet recs and sw safety precautions. Patient demonstrated [address] understanding as evidenced by engagement, decreased attention/concentration, drowsiness. The patient's plan of care including recommendations, planned interventions, and recommended diet changes were discussed with: Registered nurse.      Patient is unable to participate in goal setting and plan of care. Thank you for this referral.  Cachorro Smith M.S. CCC-SLP                  Problem: Dysphagia (Adult)  Goal: *Acute Goals and Plan of Care (Insert Text)  Description: Speech Therapy Swallow Goals  Initiated 1/14/2023  -Patient will tolerate clear liquid diet without clinical indicators of aspiration given minimal cues within 7day(s). -Patient will tolerate PO trials without clinical indicators of aspiration given minimal cues within 7 day(s). -Patient will demonstrate understanding of swallow safety precautions and aspiration precautions, diet recs with minimal cues within 7 day(s).           Note: initial

## 2023-01-15 NOTE — PROGRESS NOTES
Progress Note    Patient: Dre Aguilar MRN: [id number]  SSN: [id number]    YOB: 1950  Age: 67 y.o. Sex: female      Admit Date: 1/9/2023    LOS: 6 days     Subjective:   Patient seen in follow-up of partial small bowel obstruction  Patient seen in bed  Reports passing flatus but no bowel movement  Denies nausea vomiting however oral intake is poor    Objective:     Vitals:    01/15/23 0921 01/15/23 0956 01/15/23 1118 01/15/23 1501   BP:  (!) 117/58 (!) 114/58 (!) 104/57   Pulse:   72 61   Resp:    19   Temp:    98.3 °F (36.8 °C)   SpO2: 98%   100%   Weight:       Height:            Intake and Output:  Current Shift: No intake/output data recorded. Last three shifts: 01/13 1901 - 01/15 0700  In: -   Out: 200 [Urine:200]    Review of Systems:  ROS     Physical Exam:   Abdomen is soft, very minimally tender palpation, nondistended    Lab/Data Review:  Recent Results (from the past 24 hour(s))   GLUCOSE, POC    Collection Time: 01/14/23  4:45 PM   Result Value Ref Range    Glucose (POC) 142 (H) 65 - 100 mg/dL    Performed by Antelmo Chowdhury, POC    Collection Time: 01/14/23  9:06 PM   Result Value Ref Range    Glucose (POC) 94 65 - 100 mg/dL    Performed by Rock Child    GLUCOSE, POC    Collection Time: 01/15/23  8:16 AM   Result Value Ref Range    Glucose (POC) 139 (H) 65 - 100 mg/dL    Performed by Dasha Prieto    GLUCOSE, POC    Collection Time: 01/15/23 11:21 AM   Result Value Ref Range    Glucose (POC) 127 (H) 65 - 100 mg/dL    Performed by Dasha Prieto           Assessment:     Active Problems:    Partial small bowel obstruction ([address] (1/9/2023)      SBO (small bowel obstruction) ([address] (1/10/2023)        Plan:    Will do additional subset enema x1  Start a full liquid diet  Out of bed to chair  Continue with full aspiration precautions    Signed By: Aiden Shook MD     January 15, 2023

## 2023-01-15 NOTE — PROGRESS NOTES
Problem: Patient Education: Go to Patient Education Activity  Goal: Patient/Family Education  Outcome: Progressing Towards Goal     Problem: Falls - Risk of  Goal: *Absence of Falls  Description: Document Padmini Suarez Fall Risk and appropriate interventions in the flowsheet. Outcome: Progressing Towards Goal  Note: Fall Risk Interventions:  Mobility Interventions: Bed/chair exit alarm, Patient to call before getting OOB         Medication Interventions: Bed/chair exit alarm, Patient to call before getting OOB    Elimination Interventions: Bed/chair exit alarm, Call light in reach              Problem: Patient Education: Go to Patient Education Activity  Goal: Patient/Family Education  Outcome: Progressing Towards Goal     Problem: Pain  Goal: *Control of Pain  Outcome: Progressing Towards Goal  Goal: *PALLIATIVE CARE:  Alleviation of Pain  Outcome: Progressing Towards Goal     Problem: Patient Education: Go to Patient Education Activity  Goal: Patient/Family Education  Outcome: Progressing Towards Goal     Problem: Pressure Injury - Risk of  Goal: *Prevention of pressure injury  Description: Document Nestor Scale and appropriate interventions in the flowsheet.   Outcome: Progressing Towards Goal  Note: Pressure Injury Interventions:  Sensory Interventions: Keep linens dry and wrinkle-free    Moisture Interventions: Maintain skin hydration (lotion/cream)    Activity Interventions: PT/OT evaluation    Mobility Interventions: PT/OT evaluation    Nutrition Interventions: Document food/fluid/supplement intake, Offer support with meals,snacks and hydration    Friction and Shear Interventions: HOB 30 degrees or less                Problem: Patient Education: Go to Patient Education Activity  Goal: Patient/Family Education  Outcome: Progressing Towards Goal     Problem: Patient Education: Go to Patient Education Activity  Goal: Patient/Family Education  Outcome: Progressing Towards Goal     Problem: Patient Education: Go to Patient Education Activity  Goal: Patient/Family Education  Outcome: Progressing Towards Goal

## 2023-01-16 VITALS
DIASTOLIC BLOOD PRESSURE: 62 MMHG | BODY MASS INDEX: 37.22 KG/M2 | TEMPERATURE: 99.2 F | OXYGEN SATURATION: 99 % | HEART RATE: 68 BPM | HEIGHT: 64 IN | SYSTOLIC BLOOD PRESSURE: 137 MMHG | WEIGHT: 218 LBS | RESPIRATION RATE: 16 BRPM

## 2023-01-16 LAB
GLUCOSE BLD STRIP.AUTO-MCNC: 105 MG/DL (ref 65–100)
GLUCOSE BLD STRIP.AUTO-MCNC: 163 MG/DL (ref 65–100)
PERFORMED BY, TECHID: ABNORMAL
PERFORMED BY, TECHID: ABNORMAL

## 2023-01-16 PROCEDURE — 74011250636 HC RX REV CODE- 250/636: Performed by: FAMILY MEDICINE

## 2023-01-16 PROCEDURE — 82962 GLUCOSE BLOOD TEST: CPT

## 2023-01-16 PROCEDURE — C9113 INJ PANTOPRAZOLE SODIUM, VIA: HCPCS | Performed by: FAMILY MEDICINE

## 2023-01-16 PROCEDURE — 74011250637 HC RX REV CODE- 250/637: Performed by: FAMILY MEDICINE

## 2023-01-16 PROCEDURE — 74011000250 HC RX REV CODE- 250: Performed by: FAMILY MEDICINE

## 2023-01-16 PROCEDURE — 97110 THERAPEUTIC EXERCISES: CPT

## 2023-01-16 PROCEDURE — 94640 AIRWAY INHALATION TREATMENT: CPT

## 2023-01-16 PROCEDURE — 97116 GAIT TRAINING THERAPY: CPT

## 2023-01-16 RX ORDER — LISINOPRIL 40 MG/1
40 TABLET ORAL DAILY
Qty: 30 TABLET | Refills: 0 | Status: SHIPPED | OUTPATIENT
Start: 2023-01-17

## 2023-01-16 RX ADMIN — DILTIAZEM HYDROCHLORIDE 120 MG: 120 CAPSULE, COATED, EXTENDED RELEASE ORAL at 09:06

## 2023-01-16 RX ADMIN — IPRATROPIUM BROMIDE AND ALBUTEROL SULFATE 3 ML: 2.5; .5 SOLUTION RESPIRATORY (INHALATION) at 08:15

## 2023-01-16 RX ADMIN — HEPARIN SODIUM 5000 UNITS: 5000 INJECTION INTRAVENOUS; SUBCUTANEOUS at 06:46

## 2023-01-16 RX ADMIN — SODIUM CHLORIDE, PRESERVATIVE FREE 40 MG: 5 INJECTION INTRAVENOUS at 09:07

## 2023-01-16 RX ADMIN — GUAIFENESIN 200 MG: 200 SOLUTION ORAL at 09:07

## 2023-01-16 RX ADMIN — LISINOPRIL 40 MG: 40 TABLET ORAL at 09:06

## 2023-01-16 RX ADMIN — HEPARIN SODIUM 5000 UNITS: 5000 INJECTION INTRAVENOUS; SUBCUTANEOUS at 14:39

## 2023-01-16 NOTE — PROGRESS NOTES
Problem: Patient Education: Go to Patient Education Activity  Goal: Patient/Family Education  Outcome: Progressing Towards Goal     Problem: Falls - Risk of  Goal: *Absence of Falls  Description: Document Vernon Dennis Fall Risk and appropriate interventions in the flowsheet. Outcome: Progressing Towards Goal  Note: Fall Risk Interventions:  Mobility Interventions: Bed/chair exit alarm, OT consult for ADLs, Strengthening exercises (ROM-active/passive)         Medication Interventions: Bed/chair exit alarm    Elimination Interventions: Bed/chair exit alarm, Call light in reach              Problem: Pressure Injury - Risk of  Goal: *Prevention of pressure injury  Description: Document Nestor Scale and appropriate interventions in the flowsheet.   Outcome: Progressing Towards Goal  Note: Pressure Injury Interventions:  Sensory Interventions: Keep linens dry and wrinkle-free    Moisture Interventions: Absorbent underpads    Activity Interventions: PT/OT evaluation, Increase time out of bed    Mobility Interventions: HOB 30 degrees or less    Nutrition Interventions: Document food/fluid/supplement intake    Friction and Shear Interventions: Apply protective barrier, creams and emollients                Problem: Small Bowel Obstruction: Day 4 to Discharge  Goal: Nutrition/Diet  Outcome: Progressing Towards Goal  Goal: Medications  Outcome: Progressing Towards Goal  Goal: Treatments/Interventions/Procedures  Outcome: Progressing Towards Goal

## 2023-01-16 NOTE — DISCHARGE SUMMARY
Discharge Summary       PATIENT ID: Kain Scruggs  MRN: [id number]   YOB: 1950    DATE OF ADMISSION: 1/9/2023  8:04 PM    DATE OF DISCHARGE:   PRIMARY CARE PROVIDER: Hao Castano MD     ATTENDING PHYSICIAN: Ben Cerna  DISCHARGING PROVIDER: Ben Cerna      CONSULTATIONS: IP CONSULT TO GENERAL SURGERY  IP CONSULT TO NEUROLOGY    PROCEDURES/SURGERIES: * No surgery found *    ADMITTING DIAGNOSES:    Patient Active Problem List    Diagnosis Date Noted    SBO (small bowel obstruction) ([facility] 01/10/2023    Partial small bowel obstruction ([facility] 01/09/2023    Dyspnea 10/05/2021    SOB (shortness of breath) 10/05/2021       DISCHARGE DIAGNOSES / PLAN:      Small bowel obstruction, resolved   type 2 diabetes  Hypertension  Chronic A. Fib  Hyperlipidemia  Neuropathy  Atelectasis        DISCHARGE MEDICATIONS:  Current Discharge Medication List        START taking these medications    Details   lisinopriL (PRINIVIL, ZESTRIL) 40 mg tablet Take 1 Tablet by mouth daily. Qty: 30 Tablet, Refills: 0  Start date: 1/17/2023           CONTINUE these medications which have NOT CHANGED    Details   dilTIAZem ER (CARDIZEM CD) 120 mg capsule Take 1 Capsule by mouth daily. Qty: 30 Capsule, Refills: 1      famotidine (PEPCID) 40 mg tablet Take 1 Tablet by mouth two (2) times a day. Qty: 60 Tablet, Refills: 0      apixaban (Eliquis) 5 mg tablet Eliquis 5 mg tablet   Take 1 tablet twice a day by oral route.       pravastatin (PRAVACHOL) 80 mg tablet pravastatin 80 mg tablet      oxybutynin (DITROPAN) 5 mg tablet 1 TABLET AT BEDTIME ORALLY ONCE A DAY 10 DAYS           STOP taking these medications       acetaminophen (TYLENOL) 325 mg tablet Comments:   Reason for Stopping:         methocarbamoL (Robaxin-750) 750 mg tablet Comments:   Reason for Stopping:         amoxicillin-clavulanate (Augmentin) 875-125 mg per tablet Comments:   Reason for Stopping:         methylPREDNISolone (Medrol, Jeramie,) 4 mg tablet Comments:   Reason for Stopping:         glipiZIDE SR (GLUCOTROL XL) 5 mg CR tablet Comments:   Reason for Stopping:         amLODIPine (NORVASC) 10 mg tablet Comments:   Reason for Stopping:         ramipriL (ALTACE) 10 mg capsule Comments:   Reason for Stopping:         chlorthalidone (HYGROTON) 25 mg tablet Comments:   Reason for Stopping:         gabapentin (NEURONTIN) 100 mg capsule Comments:   Reason for Stopping:         magnesium oxide (MAG-OX) 400 mg tablet Comments:   Reason for Stopping:                 NOTIFY YOUR PHYSICIAN FOR ANY OF THE FOLLOWING:   Fever over 101 degrees for 24 hours. Chest pain, shortness of breath, fever, chills, nausea, vomiting, diarrhea, change in mentation, falling, weakness, bleeding. Severe pain or pain not relieved by medications. Or, any other signs or symptoms that you may have questions about. DISPOSITION:  x  Home With:   OT  PT  HH  RN       Long term SNF/Inpatient Rehab    Independent/assisted living    Hospice    Other:       PATIENT CONDITION AT DISCHARGE: Stable      PHYSICAL EXAMINATION AT DISCHARGE:  General:          Alert, cooperative, no distress, appears stated age. HEENT:           Atraumatic, anicteric sclerae, pink conjunctivae                          No oral ulcers, mucosa moist, throat clear, dentition fair  Neck:               Supple, symmetrical  Lungs:             Clear to auscultation bilaterally. No Wheezing or Rhonchi. No rales. Chest wall:      No tenderness  No Accessory muscle use. Heart:              Regular  rhythm,  No  murmur   No edema  Abdomen:        Soft, non-tender. Not distended. Bowel sounds normal  Extremities:     No cyanosis. No clubbing,                            Skin turgor normal, Capillary refill normal  Skin:                Not pale. Not Jaundiced  No rashes   Psych:             Not anxious or agitated.   Neurologic:      Alert, moves all extremities, answers questions appropriately and responds to commands XR ABD (KUB)   Final Result   1. No evidence of obstruction          CT ABD PELV WO CONT   Final Result   1. Resolved small bowel obstruction      XR CHEST PORT   Final Result   1. No significant change in the appearance of the chest.                XR ABD (KUB)   Final Result   1. No visible acute process                      CT CODE NEURO HEAD WO CONTRAST   Final Result   No acute intracranial abnormality. Right cerebellar chronic infarct. XR CHEST PA LAT   Final Result   Bibasilar airspace disease. XR ABD (KUB)   Final Result      No visible small bowel obstruction at this time. Decreased mild colonic   constipation pattern. Persistent left lung base atelectasis versus pneumonia. Recommend PA and lateral chest views. XR ABD (KUB)   Final Result   Nasogastric tube over the stomach. Large amount colonic stool. No definite   change allowing for technical differences compared to CT. CTA ABD PELV W WO CONT   Final Result   Imaging findings consistent with early/partial small bowel obstruction,   transition point in the left lower quadrant most likely related to adhesions. Please see above for additional nonemergent incidental findings.                  Recent Results (from the past 24 hour(s))   GLUCOSE, POC    Collection Time: 01/15/23  4:54 PM   Result Value Ref Range    Glucose (POC) 115 (H) 65 - 100 mg/dL    Performed by Adali Plaza, POC    Collection Time: 01/15/23  8:04 PM   Result Value Ref Range    Glucose (POC) 128 (H) 65 - 100 mg/dL    Performed by Stanley Brenner    GLUCOSE, POC    Collection Time: 01/16/23  8:03 AM   Result Value Ref Range    Glucose (POC) 105 (H) 65 - 100 mg/dL    Performed by Marcelino Jimenez, POC    Collection Time: 01/16/23 11:34 AM   Result Value Ref Range    Glucose (POC) 163 (H) 65 - 100 mg/dL    Performed by Elmo 18:  Patient is a 67y.o. year old female with significant past medical history of type 2 diabetes hypertension chronic A. fib hyperlipidemia came to emergency room complaining of abdominal pain for almost 3 days no vomiting for 3 days getting normal worse no nausea no vomiting no fever no chills came to emergency room seen by the ER physician     CT scan of the abdomen done which shows  Imaging findings consistent with early/partial small bowel obstruction,  transition point in the left lower quadrant most likely related to adhesions. Please see above for additional nonemergent incidental findings     Patient was recommend to be admitted for further evaluation treatment     When I saw the patient patient still have complaining a lot of abdominal pain  I ordered NG tube placement  And surgical consult     1/11  Patient seen and examined in room today. She continues to complain of mild abdominal pain. She also has NG tube in place. Denies bowel movement. Abdominal KUB done this morning shows large volume stool in the colon. 1/12  Patient seen and examined in room today. She complains of pain on her left side of face. Denies any numbness or tingling. Brief neuro exam showed no evidence of stroke. She is also reporting shortness of breath. Per nurse at bedside her sats went below 88% this morning so she was put on 2L nasal cannula. Patient is very lethargic today. Soap evelyn enema done yesterday. Patient tolerated well without complaints and 5-6 small round pieces were noted to watery stool. Patient denies any abdominal pain today. Lab techs are having difficulty sticking her so we have limited lab info on chart. X ray of abdomen done this morning shows no visible small bowel obstruction at this time. Decreased mild colonic constipation pattern. Persistent left lung base atelectasis versus pneumonia. 1/13  Patient seen at the bedside. She reports feeling better than yesterday. Soap evelyn enema was done yesterday and she had watery stool.  NG tube has been removed and the patient is now on clear liquid diet. She will be working on ambulation and PT has been ordered. 1/14  Patient have a stroke alert yesterday CT scan of the head was negative patient has no symptoms today     1/15  Complaining of left facial numbness     1/16  Patient seen and examined in room today. She reports feeling better. Pt is on full liquid diet and tolerating well. She denies any nausea or vomiting. She reports having bowel movement this morning with watery stool.       KUB done yesterday shows no evidence of obstruction    Patient had a bowel movement yesterday feeling much better still have very poor appetite feels weak and tired discharge home with home health PT OT discussed with the patient to eat more and out of the bed to chair  Will hold diabetic medications as patient's appetite is very poor    Medication reconciliation done time discharge patient 35 minutes 50% time spent counseling and coordination of care      Signed:   Nichole Kolb MD  1/16/2023  1:38 PM

## 2023-01-16 NOTE — PROGRESS NOTES
Problem: Patient Education: Go to Patient Education Activity  Goal: Patient/Family Education  Outcome: Progressing Towards Goal     Problem: Falls - Risk of  Goal: *Absence of Falls  Description: Document Gurwinder Odonnell Fall Risk and appropriate interventions in the flowsheet. Outcome: Progressing Towards Goal  Note: Fall Risk Interventions:  Mobility Interventions: Bed/chair exit alarm, Patient to call before getting OOB         Medication Interventions: Bed/chair exit alarm, Patient to call before getting OOB    Elimination Interventions: Bed/chair exit alarm, Call light in reach              Problem: Patient Education: Go to Patient Education Activity  Goal: Patient/Family Education  Outcome: Progressing Towards Goal     Problem: Pain  Goal: *Control of Pain  Outcome: Progressing Towards Goal  Goal: *PALLIATIVE CARE:  Alleviation of Pain  Outcome: Progressing Towards Goal     Problem: Patient Education: Go to Patient Education Activity  Goal: Patient/Family Education  Outcome: Progressing Towards Goal     Problem: Pressure Injury - Risk of  Goal: *Prevention of pressure injury  Description: Document Nestor Scale and appropriate interventions in the flowsheet.   Outcome: Progressing Towards Goal  Note: Pressure Injury Interventions:  Sensory Interventions: Keep linens dry and wrinkle-free    Moisture Interventions: Absorbent underpads    Activity Interventions: Increase time out of bed    Mobility Interventions: PT/OT evaluation    Nutrition Interventions: Offer support with meals,snacks and hydration, Document food/fluid/supplement intake    Friction and Shear Interventions: Apply protective barrier, creams and emollients                Problem: Patient Education: Go to Patient Education Activity  Goal: Patient/Family Education  Outcome: Progressing Towards Goal     Problem: Patient Education: Go to Patient Education Activity  Goal: Patient/Family Education  Outcome: Progressing Towards Goal     Problem: Patient Education: Go to Patient Education Activity  Goal: Patient/Family Education  Outcome: Progressing Towards Goal

## 2023-01-16 NOTE — PROGRESS NOTES
Problem: Patient Education: Go to Patient Education Activity  Goal: Patient/Family Education  Outcome: Progressing Towards Goal     Problem: Falls - Risk of  Goal: *Absence of Falls  Description: Document Margaret All Fall Risk and appropriate interventions in the flowsheet. Outcome: Progressing Towards Goal  Note: Fall Risk Interventions:  Mobility Interventions: Bed/chair exit alarm, Patient to call before getting OOB         Medication Interventions: Bed/chair exit alarm, Patient to call before getting OOB    Elimination Interventions: Bed/chair exit alarm, Call light in reach              Problem: Patient Education: Go to Patient Education Activity  Goal: Patient/Family Education  Outcome: Progressing Towards Goal     Problem: Pain  Goal: *Control of Pain  Outcome: Progressing Towards Goal  Goal: *PALLIATIVE CARE:  Alleviation of Pain  Outcome: Progressing Towards Goal     Problem: Patient Education: Go to Patient Education Activity  Goal: Patient/Family Education  Outcome: Progressing Towards Goal     Problem: Pressure Injury - Risk of  Goal: *Prevention of pressure injury  Description: Document Nestor Scale and appropriate interventions in the flowsheet.   Outcome: Progressing Towards Goal  Note: Pressure Injury Interventions:  Sensory Interventions: Keep linens dry and wrinkle-free    Moisture Interventions: Absorbent underpads    Activity Interventions: Increase time out of bed    Mobility Interventions: PT/OT evaluation    Nutrition Interventions: Offer support with meals,snacks and hydration, Document food/fluid/supplement intake    Friction and Shear Interventions: Apply protective barrier, creams and emollients                Problem: Patient Education: Go to Patient Education Activity  Goal: Patient/Family Education  Outcome: Progressing Towards Goal     Problem: Patient Education: Go to Patient Education Activity  Goal: Patient/Family Education  Outcome: Progressing Towards Goal     Problem: Patient Education: Go to Patient Education Activity  Goal: Patient/Family Education  Outcome: Progressing Towards Goal     Problem: Patient Education: Go to Patient Education Activity  Goal: Patient/Family Education  Outcome: Progressing Towards Goal     Problem: Small Bowel Obstruction: Day 4 to Discharge  Goal: Off Pathway (Use only if patient is Off Pathway)  Outcome: Progressing Towards Goal  Goal: Activity/Safety  Outcome: Progressing Towards Goal  Goal: Nutrition/Diet  Outcome: Progressing Towards Goal  Goal: Discharge Planning  Outcome: Progressing Towards Goal  Goal: Medications  Outcome: Progressing Towards Goal  Goal: Respiratory  Outcome: Progressing Towards Goal  Goal: Treatments/Interventions/Procedures  Outcome: Progressing Towards Goal  Goal: Psychosocial  Outcome: Progressing Towards Goal

## 2023-01-16 NOTE — DISCHARGE INSTRUCTIONS
Discharge Instructions       PATIENT ID: Silva Boykin  MRN: [id number]   YOB: 1950    DATE OF ADMISSION: [unfilled]    DATE OF DISCHARGE: 1/16/2023    PRIMARY CARE PROVIDER: @PCP@     ATTENDING PHYSICIAN: [unfilled]  DISCHARGING PROVIDER: Mary Gotti MD    To contact this individual call [phone] and ask the  to page. If unavailable ask to be transferred the Adult Hospitalist Department. DISCHARGE DIAGNOSES small bowel obstruction    CONSULTATIONS: [unfilled]    PROCEDURES/SURGERIES: * No surgery found *    PENDING TEST RESULTS:   At the time of discharge the following test results are still pending: None    FOLLOW UP APPOINTMENTS:   [facility]     ADDITIONAL CARE RECOMMENDATIONS: Discharge home with PT OT    DIET: Diabetic Diet      ACTIVITY: Activity as tolerated    Wound care: Wound Care Order: submitted to [facility] Please view [email or web address]    EQUIPMENT needed: ***      DISCHARGE MEDICATIONS:   See Medication Reconciliation Form    It is important that you take the medication exactly as they are prescribed. Keep your medication in the bottles provided by the pharmacist and keep a list of the medication names, dosages, and times to be taken in your wallet. Do not take other medications without consulting your doctor. NOTIFY YOUR PHYSICIAN FOR ANY OF THE FOLLOWING:   Fever over 101 degrees for 24 hours. Chest pain, shortness of breath, fever, chills, nausea, vomiting, diarrhea, change in mentation, falling, weakness, bleeding. Severe pain or pain not relieved by medications. Or, any other signs or symptoms that you may have questions about.       DISPOSITION:    Home With:   OT  PT  HH  RN       SNF/Inpatient Rehab/LTAC    Independent/assisted living    Hospice    Other:         PROBLEM LIST Updated:  Yes ***       Signed:   Mary Gotti MD  1/16/2023  1:37 PM

## 2023-01-16 NOTE — PROGRESS NOTES
PHYSICAL THERAPY TREATMENT  Patient: Cici Lane (12 y.o. female)  Date: 1/16/2023  Diagnosis: Partial small bowel obstruction (HCC) [K56.600]  [facility] [K56.609] <principal problem not specified>      Precautions:    Chart, physical therapy assessment, plan of care and goals were reviewed. ASSESSMENT  Patient continues with skilled PT services and is progressing towards goals. Pt seated in chair upon PT arrival, agreeable to session. (See below for objective details and assist levels). Overall pt tolerated session fairly today. Patient ambulated for increased distance into hallway with [facility] demonstrating slow step through gait pattern with no LOB or knee buckling but noted decreased stance phase on RLE. Patient also had 2 standing  rest breaks during ambulation. Patient then sat in recliner chair and performed seated TE ( see details below). Patient then left seated in chair with call bell within reach and all needs meet with family member present. Current Level of Function Impacting Discharge (mobility/balance): Impaired balance, general weakness, poor activity tolerance    Other factors to consider for discharge: PLOF, assistance at home, level of deficits, acute medical state         PLAN :  Patient continues to benefit from skilled intervention to address the above impairments. Continue treatment per established plan of care to address goals. Recommendation for discharge: (in order for the patient to meet his/her long term goals)  Home with [address] [place] [address]    This discharge recommendation:  Has been made in collaboration with the attending provider and/or case management    IF patient discharges home will need the following DME: patient owns DME required for discharge       SUBJECTIVE:   Patient stated im tried.     OBJECTIVE DATA SUMMARY:   Critical Behavior:  Neurologic State: Alert  Orientation Level: Oriented X4  Cognition: Follows commands     Functional Mobility Training:  Transfers:  Sit to Stand: Moderate assistance  Stand to Sit: Minimum assistance  Balance:  Sitting: Intact  Standing: Intact; With support  Ambulation/Gait Training:  Distance (ft): 27 Feet (ft)  Assistive Device: Gait belt;Cane, straight  Ambulation - Level of Assistance: Contact guard assistance;Minimal assistance    Therapeutic Exercises:   1x20 AP  1x15 LAQ  1x15 seated marches     Pain Ratin/10    Activity Tolerance:   Fair and requires rest breaks    After treatment patient left in no apparent distress:   Bed locked and returned to lowest position, Sitting in chair, Call bell within reach, and Caregiver / family present      COMMUNICATION/COLLABORATION:   The patients plan of care was discussed with: Registered nurse. GOALS:    Problem: Patient Education: Go to Patient Education Activity  Goal: Patient/Family Education  Outcome: Progressing Towards Goal     Problem: Mobility Impaired (Adult and Pediatric)  Goal: *Acute Goals and Plan of Care (Insert Text)  Description: Patient stated goal: get better  Patient will move from supine to sit and sit to supine , scoot up and down, and roll side to side in bed with supervision/set-up within 7 day(s). Patient will transfer from bed to chair and chair to bed with supervision/set-up using the least restrictive device within 7 day(s). Patient will improve static standing balance to minimal assistance within 1 week(s). Patient will ambulate 35 feet with minimal assistance with least restrictive device within 1 weeks.      Outcome: Progressing Towards Goal       Samanta Sanchez PTA, PT   Time Calculation: 25 mins

## 2023-01-16 NOTE — PROGRESS NOTES
Checked in on patient while rounding in [facility]/Med/Surg Unit. Patient's daughter was present at bedside. Advised of  availability. Reviewed chart. Rev.  Zaida Recio [address], [address]

## 2023-01-16 NOTE — PROGRESS NOTES
CM attempted to discuss DCP and therapy recs with patient. PT currently working with patient. CM will follow-up again shortly.    -----------    1445- DC order noted. CM met with patient and daughter at bedside to discuss therapy recs for IRF vs. HH. Patient agrees with [facility] as she has had that before with [employer]. Choice letter signed and referral sent.    -----------    1500- Patient accepted with [facility] date- 1/17. CM called patients daughterNasrin @ [phone] to notify of acceptance and SOC date. Daughter will provide transport upon DC. Discharge plan of care/case management plan validated with provider discharge order. Medicare pt has received, reviewed, and signed 2nd IM letter informing them of their right to appeal the discharge. Signed copied has been placed on pt bedside chart.

## 2023-01-16 NOTE — CONSULTS
Consult Date: 1/16/2023    Consults Ms. Kenji Cr is a 69-year-old woman with history of diabetes, hypertension, high cholesterol who comes in for evaluation and treatment of small bowel obstruction is being followed by surgery. Per her records on January 12, 2023 she had a stroke alert because she was becoming less responsive than her baseline but over time she did recover. 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 Her daughter is at bedside and states that she has improved every day since. There was no focal deficits noticed on the 12th with any facial droop, focal numbness or weakness. As for the left-sided facial numbness they tell me that it is not numbness and more pain. This started 3 days ago where she has pain in the upper jaw or region and she specifically denies numbness. Subjective     Past Medical History:   Diagnosis Date    Diabetes ([address]     High cholesterol     Hypertension       Past Surgical History:   Procedure Laterality Date    HX HEMORRHOIDECTOMY      HX TONSILLECTOMY      HX WISDOM TEETH EXTRACTION       No family history on file.    Social History     Tobacco Use    Smoking status: Never    Smokeless tobacco: Never   Substance Use Topics    Alcohol use: Never       Current Facility-Administered Medications   Medication Dose Route Frequency Provider Last Rate Last Admin    acetaminophen (TYLENOL) solution 650 mg  650 mg Oral Q6H PRN Beverly Cerna MD   650 mg at 01/15/23 2006    guaiFENesin (ROBITUSSIN) 100 mg/5 mL oral liquid 200 mg  200 mg Oral BID Beverly Cerna MD   200 mg at 01/15/23 2006    benzocaine-menthoL (CHLORASEPTIC) lozenge 1 Lozenge  1 Lozenge Mucous Membrane PRN Beverly Cerna MD   1 Lozenge at 01/15/23 0601    albuterol-ipratropium (DUO-NEB) 2.5 MG-0.5 MG/3 ML  3 mL Nebulization BID RT Beverly Cerna MD   3 mL at 01/16/23 0815    hydrALAZINE (APRESOLINE) 20 mg/mL injection 10 mg  10 mg IntraVENous Q6H PRN Mili Cerna, MD        dextrose 5 % - 0.45% NaCl infusion  50 mL/hr IntraVENous CONTINUOUS Beverly Cerna MD 50 mL/hr at 01/15/23 0556 50 mL/hr at 01/15/23 0556    [Held by provider] apixaban (ELIQUIS) tablet 5 mg  5 mg Oral BID Davian Cerna MD        dilTIAZem ER (CARDIZEM CD) capsule 120 mg  120 mg Oral DAILY Davian Cerna MD   120 mg at 01/15/23 1122    insulin lispro (HUMALOG) injection   SubCUTAneous AC&HS Rodolfo Patel MD   2 Units at 01/12/23 1031    glucose chewable tablet 16 g  4 Tablet Oral PRN Davian Cerna MD        glucagon (GLUCAGEN) injection 1 mg  1 mg IntraMUSCular PRN Davian Cerna MD        acetaminophen (TYLENOL) suppository 650 mg  650 mg Rectal Q6H PRN Rodolfo Patel MD        polyethylene glycol (MIRALAX) packet 17 g  17 g Oral DAILY PRN Rodolfo Patel MD        ondansetron (ZOFRAN ODT) tablet 4 mg  4 mg Oral Q8H PRN Beverly Cerna MD        Or    ondansetron [facility]) injection 4 mg  4 mg IntraVENous Q6H PRN Beverly Cerna MD        heparin (porcine) injection 5,000 Units  5,000 Units SubCUTAneous Q8H Beverly Cerna MD   5,000 Units at 01/16/23 0646    atorvastatin (LIPITOR) tablet 40 mg  40 mg Oral QHS Beverly Cerna MD   40 mg at 01/15/23 2244    pantoprazole (PROTONIX) 40 mg in 0.9% sodium chloride 10 mL injection  40 mg IntraVENous DAILY Beverly Cerna MD   40 mg at 01/15/23 0838    lisinopriL (PRINIVIL, ZESTRIL) tablet 40 mg  40 mg Oral DAILY Rodolfo Patel MD   40 mg at 01/14/23 2867        Review of Systems   All other systems reviewed and are negative. Objective     Vital signs for last 24 hours:  Visit Vitals  /62 (BP 1 Location: Left upper arm, BP Patient Position: At rest)   Pulse 68   Temp 99.2 °F (37.3 °C)   Resp 16   Ht 5' 4\" (1.626 m)   Wt 98.9 kg (218 lb)   SpO2 99%   Breastfeeding No   BMI 37.42 kg/m²       Intake/Output this shift:  Current Shift: No intake/output data recorded.   Last 3 Shifts: 01/14 1901 - 01/16 0700  In: 6739 [I.V.:6739]  Out: 300 [Urine:300]    Data Review:   Recent Results (from the past 24 hour(s))   GLUCOSE, POC    Collection Time: 01/15/23 11:21 AM   Result Value Ref Range    Glucose (POC) 127 (H) 65 - 100 mg/dL    Performed by Nelva Hatchet, POC    Collection Time: 01/15/23  4:54 PM   Result Value Ref Range    Glucose (POC) 115 (H) 65 - 100 mg/dL    Performed by Nelva Hatchet, POC    Collection Time: 01/15/23  8:04 PM   Result Value Ref Range    Glucose (POC) 128 (H) 65 - 100 mg/dL    Performed by Guevara Jack    GLUCOSE, POC    Collection Time: 01/16/23  8:03 AM   Result Value Ref Range    Glucose (POC) 105 (H) 65 - 100 mg/dL    Performed by Mike Vela        Physical Exam    Neuro Physical Exam      General: Well developed, well nourished. Patient in no apparent distress. Cardiac: Regular rate and rhythm with no murmurs. Neurological Exam:  Mental Status: Awake alert oriented x4, normal speech   Cranial Nerves:   Intact visual fields. PERRL, EOM's full, no nystagmus, no ptosis. Facial sensation is normal. Facial movement is symmetric. Palate is midline. Normal sternocleidomastoid strength. Tongue is midline. Hearing is intact bilaterally. Motor:  5/5 strength in upper and lower proximal and distal muscles. Normal bulk and tone. Reflexes:   Deep tendon reflexes 2+/4 and symmetrical.   Sensory:   Normal to temperature and vibration. Gait:  Normal gait. Tremor:   No tremor noted. Cerebellar:  No cerebellar signs present. Neurovascular:      No carotid bruits. Assessment and plan: Ms. Reji Crowley is a 65-year-old woman who was a code stroke because of altered mental status that is self resolved. She had been getting morphine which her daughter tells me prior to this. The event itself without absence of focal neurological deficit is not consistent with a stroke and I will cancel any stroke work-up.   She does not have limb facial numbness but more left upper jaw pain which should be evaluated by dentistry. Neurology will sign off.

## 2023-01-16 NOTE — PROGRESS NOTES
General Daily Progress Note          Patient Name:   Silva Boykin       YOB: 1950       Age:  67 y.o. Admit Date: 1/9/2023      Subjective:     Patient is a 67y.o. year old female with significant past medical history of type 2 diabetes hypertension chronic A. fib hyperlipidemia came to emergency room complaining of abdominal pain for almost 3 days no vomiting for 3 days getting normal worse no nausea no vomiting no fever no chills came to emergency room seen by the ER physician     CT scan of the abdomen done which shows  Imaging findings consistent with early/partial small bowel obstruction,  transition point in the left lower quadrant most likely related to adhesions. Please see above for additional nonemergent incidental findings     Patient was recommend to be admitted for further evaluation treatment     When I saw the patient patient still have complaining a lot of abdominal pain  I ordered NG tube placement  And surgical consult     1/11  Patient seen and examined in room today. She continues to complain of mild abdominal pain. She also has NG tube in place. Denies bowel movement. Abdominal KUB done this morning shows large volume stool in the colon. 1/12  Patient seen and examined in room today. She complains of pain on her left side of face. Denies any numbness or tingling. Brief neuro exam showed no evidence of stroke. She is also reporting shortness of breath. Per nurse at bedside her sats went below 88% this morning so she was put on 2L nasal cannula. Patient is very lethargic today. Soap evelyn enema done yesterday. Patient tolerated well without complaints and 5-6 small round pieces were noted to watery stool. Patient denies any abdominal pain today. Lab techs are having difficulty sticking her so we have limited lab info on chart. X ray of abdomen done this morning shows no visible small bowel obstruction at this time. Decreased mild colonic constipation pattern. Persistent left lung base atelectasis versus pneumonia. 1/13  Patient seen at the bedside. She reports feeling better than yesterday. Soap evelyn enema was done yesterday and she had watery stool. NG tube has been removed and the patient is now on clear liquid diet. She will be working on ambulation and PT has been ordered. 1/14  Patient have a stroke alert yesterday CT scan of the head was negative patient has no symptoms today    1/15  Complaining of left facial numbness    1/16  Patient seen and examined in room today. She reports feeling better. Pt is on full liquid diet and tolerating well. She denies any nausea or vomiting. She reports having bowel movement this morning with watery stool. KUB done yesterday shows no evidence of obstruction      Objective:     Visit Vitals  /62 (BP 1 Location: Left upper arm, BP Patient Position: At rest)   Pulse 68   Temp 99.2 °F (37.3 °C)   Resp 16   Ht 5' 4\" (1.626 m)   Wt 98.9 kg (218 lb)   SpO2 99%   Breastfeeding No   BMI 37.42 kg/m²        Recent Results (from the past 24 hour(s))   GLUCOSE, POC    Collection Time: 01/15/23 11:21 AM   Result Value Ref Range    Glucose (POC) 127 (H) 65 - 100 mg/dL    Performed by Enedelia Bamberger, POC    Collection Time: 01/15/23  4:54 PM   Result Value Ref Range    Glucose (POC) 115 (H) 65 - 100 mg/dL    Performed by Enedelia Bamberger, POC    Collection Time: 01/15/23  8:04 PM   Result Value Ref Range    Glucose (POC) 128 (H) 65 - 100 mg/dL    Performed by Trilby Shone    GLUCOSE, POC    Collection Time: 01/16/23  8:03 AM   Result Value Ref Range    Glucose (POC) 105 (H) 65 - 100 mg/dL    Performed by Alexsandra Cheatham      [unfilled]      Review of Systems    Constitutional: Negative for chills and fever. HENT: Negative. Eyes: Negative. Respiratory: Negative. Cardiovascular: Negative. Gastrointestinal: Negative for abdominal pain and nausea. Skin: Negative. Neurological: Negative. Physical Exam:      Constitutional: pt is oriented to person, place, and time. HENT:   Head: Normocephalic and atraumatic. Eyes: Pupils are equal, round, and reactive to light. EOM are normal.   Cardiovascular: Normal rate, regular rhythm and normal heart sounds. Pulmonary/Chest: Breath sounds normal. No wheezes. No rales. Exhibits no tenderness. Abdominal: Soft. Bowel sounds are normal. There is no abdominal tenderness. There is no rebound and no guarding. Musculoskeletal: Normal range of motion. Neurological: pt is alert and oriented to person, place, and time. XR ABD (KUB)   Final Result   1. No evidence of obstruction          CT ABD PELV WO CONT   Final Result   1. Resolved small bowel obstruction      XR CHEST PORT   Final Result   1. No significant change in the appearance of the chest.                XR ABD (KUB)   Final Result   1. No visible acute process                      CT CODE NEURO HEAD WO CONTRAST   Final Result   No acute intracranial abnormality. Right cerebellar chronic infarct. XR CHEST PA LAT   Final Result   Bibasilar airspace disease. XR ABD (KUB)   Final Result      No visible small bowel obstruction at this time. Decreased mild colonic   constipation pattern. Persistent left lung base atelectasis versus pneumonia. Recommend PA and lateral chest views. XR ABD (KUB)   Final Result   Nasogastric tube over the stomach. Large amount colonic stool. No definite   change allowing for technical differences compared to CT. CTA ABD PELV W WO CONT   Final Result   Imaging findings consistent with early/partial small bowel obstruction,   transition point in the left lower quadrant most likely related to adhesions. Please see above for additional nonemergent incidental findings.                  Recent Results (from the past 24 hour(s))   GLUCOSE, POC    Collection Time: 01/15/23 11:21 AM   Result Value Ref Range    Glucose (POC) 127 (H) 65 - 100 mg/dL    Performed by Polly Burrell, POC    Collection Time: 01/15/23  4:54 PM   Result Value Ref Range    Glucose (POC) 115 (H) 65 - 100 mg/dL    Performed by Polly Burrell, POC    Collection Time: 01/15/23  8:04 PM   Result Value Ref Range    Glucose (POC) 128 (H) 65 - 100 mg/dL    Performed by Karl Daniel    GLUCOSE, POC    Collection Time: 01/16/23  8:03 AM   Result Value Ref Range    Glucose (POC) 105 (H) 65 - 100 mg/dL    Performed by Peyton Perez        Results       ** No results found for the last 336 hours. **             Labs:     No results for input(s): WBC, HGB, HCT, PLT, HGBEXT, HCTEXT, PLTEXT, HGBEXT, HCTEXT, PLTEXT in the last 72 hours. No results for input(s): NA, K, CL, CO2, BUN, CREA, GLU, CA, MG, PHOS, URICA in the last 72 hours. No results for input(s): ALT, AP, TBIL, TBILI, TP, ALB, GLOB, GGT, AML, LPSE in the last 72 hours. No lab exists for component: SGOT, GPT, AMYP, HLPSE    No results for input(s): INR, PTP, APTT, INREXT, INREXT in the last 72 hours. No results for input(s): FE, TIBC, PSAT, FERR in the last 72 hours. No results found for: FOL, RBCF   No results for input(s): PH, PCO2, PO2 in the last 72 hours. No results for input(s): CPK, CKNDX, TROIQ in the last 72 hours.     No lab exists for component: CPKMB  No results found for: CHOL, CHOLX, CHLST, CHOLV, HDL, HDLP, LDL, LDLC, DLDLP, TGLX, TRIGL, TRIGP, CHHD, CHHDX  Lab Results   Component Value Date/Time    Glucose (POC) 105 (H) 01/16/2023 08:03 AM    Glucose (POC) 128 (H) 01/15/2023 08:04 PM    Glucose (POC) 115 (H) 01/15/2023 04:54 PM    Glucose (POC) 127 (H) 01/15/2023 11:21 AM    Glucose (POC) 139 (H) 01/15/2023 08:16 AM     Lab Results   Component Value Date/Time    Color Yellow/Straw 10/05/2021 11:25 AM    Appearance Clear 10/05/2021 11:25 AM    Specific gravity 1.014 10/05/2021 11:25 AM    pH (UA) 7.0 10/05/2021 11:25 AM    Protein Negative 10/05/2021 11:25 AM    Glucose Negative 10/05/2021 11:25 AM    Ketone Negative 10/05/2021 11:25 AM    Bilirubin Negative 10/05/2021 11:25 AM    Urobilinogen 0.1 10/05/2021 11:25 AM    Nitrites Negative 10/05/2021 11:25 AM    Leukocyte Esterase Negative 10/05/2021 11:25 AM    Bacteria Negative 10/05/2021 11:25 AM    WBC 0-4 10/05/2021 11:25 AM    RBC 0-5 10/05/2021 11:25 AM         Assessment:     Small bowel obstruction, improved  Type 2 diabetes  Hypertension  Chronic A.  Fib  Hyperlipidemia  Neuropathy  Atelectasis    Plan:   Eliquis held by GI  Continue to monitor labs  Pt on full liquid diet    If medically stable likely discharge in 24-48 hours          Current Facility-Administered Medications:     acetaminophen (TYLENOL) solution 650 mg, 650 mg, Oral, Q6H PRN, Beverly Cerna MD, 650 mg at 01/15/23 2006    guaiFENesin (ROBITUSSIN) 100 mg/5 mL oral liquid 200 mg, 200 mg, Oral, BID, Beverly Cerna MD, 200 mg at 01/15/23 2006    benzocaine-menthoL (CHLORASEPTIC) lozenge 1 Lozenge, 1 Lozenge, Mucous Membrane, PRN, Bianka Richard MD, 1 Lozenge at 01/15/23 0601    albuterol-ipratropium (DUO-NEB) 2.5 MG-0.5 MG/3 ML, 3 mL, Nebulization, BID RT, Beverly Cerna MD, 3 mL at 01/16/23 0815    hydrALAZINE (APRESOLINE) 20 mg/mL injection 10 mg, 10 mg, IntraVENous, Q6H PRN, Beverly Cerna MD    dextrose 5 % - 0.45% NaCl infusion, 50 mL/hr, IntraVENous, CONTINUOUS, Beverly Cerna MD, Last Rate: 50 mL/hr at 01/15/23 0556, 50 mL/hr at 01/15/23 0556    [Held by provider] apixaban (ELIQUIS) tablet 5 mg, 5 mg, Oral, BID, Beverly Cerna MD    dilTIAZem ER (CARDIZEM CD) capsule 120 mg, 120 mg, Oral, DAILY, Beverly Cerna MD, 120 mg at 01/15/23 1122    insulin lispro (HUMALOG) injection, , SubCUTAneous, AC&HS, Beverly Cerna MD, 2 Units at 01/12/23 1031    glucose chewable tablet 16 g, 4 Tablet, Oral, PRN, Beverly Cerna MD    glucagon (GLUCAGEN) injection 1 mg, 1 mg, IntraMUSCular, PRN, Bianka Richard MD    [DISCONTINUED] acetaminophen (TYLENOL) tablet 650 mg, 650 mg, Oral, Q6H PRN, 650 mg at 01/15/23 0550 **OR** acetaminophen (TYLENOL) suppository 650 mg, 650 mg, Rectal, Q6H PRN, Beverly Cerna MD    polyethylene glycol (MIRALAX) packet 17 g, 17 g, Oral, DAILY PRN, Beverly Cerna MD    ondansetron (ZOFRAN ODT) tablet 4 mg, 4 mg, Oral, Q8H PRN **OR** ondansetron (ZOFRAN) injection 4 mg, 4 mg, IntraVENous, Q6H PRN, Beverly Cerna MD    heparin (porcine) injection 5,000 Units, 5,000 Units, SubCUTAneous, Q8H, Beverly Cerna MD, 5,000 Units at 01/16/23 0646    atorvastatin (LIPITOR) tablet 40 mg, 40 mg, Oral, QHS, Beverly Cerna MD, 40 mg at 01/15/23 2244    pantoprazole (PROTONIX) 40 mg in 0.9% sodium chloride 10 mL injection, 40 mg, IntraVENous, DAILY, Beverly Cerna MD, 40 mg at 01/15/23 0838    lisinopriL (PRINIVIL, ZESTRIL) tablet 40 mg, 40 mg, Oral, DAILY, Beverly Cerna MD, 40 mg at 01/14/23 1270

## 2023-01-16 NOTE — PROGRESS NOTES
For discharge today with home health. Peripheral and midline removed, no bleeding and/or hematoma noted. No issues prior to discharge. Discharge documents handed to patient's daughter.

## 2023-03-23 ENCOUNTER — TRANSCRIBE ORDER (OUTPATIENT)
Dept: SCHEDULING | Age: 73
End: 2023-03-23

## 2023-03-24 ENCOUNTER — TRANSCRIBE ORDER (OUTPATIENT)
Dept: SCHEDULING | Age: 73
End: 2023-03-24

## 2023-03-24 DIAGNOSIS — R92.1 CALCIFICATION OF LEFT BREAST ON MAMMOGRAPHY: Primary | ICD-10-CM

## 2023-04-06 ENCOUNTER — HOSPITAL ENCOUNTER (OUTPATIENT)
Dept: MAMMOGRAPHY | Age: 73
End: 2023-04-06
Payer: MEDICARE

## 2023-04-06 PROCEDURE — 77065 DX MAMMO INCL CAD UNI: CPT

## 2023-04-06 PROCEDURE — C1819 TISSUE LOCALIZATION-EXCISION: HCPCS

## 2023-04-06 PROCEDURE — 19081 BX BREAST 1ST LESION STRTCTC: CPT

## 2023-04-06 PROCEDURE — A4648 IMPLANTABLE TISSUE MARKER: HCPCS

## 2023-04-06 PROCEDURE — 77030019952 HC CANSTR VAC ASST KCON -B

## 2023-04-23 DIAGNOSIS — R92.1 CALCIFICATION OF LEFT BREAST ON MAMMOGRAPHY: Primary | ICD-10-CM

## 2023-04-23 DIAGNOSIS — R92.8 ABNORMAL MAMMOGRAM: ICD-10-CM

## 2023-04-24 DIAGNOSIS — R92.1 CALCIFICATION OF LEFT BREAST ON MAMMOGRAPHY: Primary | ICD-10-CM

## 2023-05-21 NOTE — ED PROVIDER NOTES
EMERGENCY DEPARTMENT HISTORY AND PHYSICAL EXAM      Date: 10/5/2021  Patient Name: Jocelyne Deleon    History of Presenting Illness     Chief Complaint   Patient presents with    Shortness of Breath       History Provided By: Patient    HPI: Jocelyne Deleon, 66 y.o. female with a past medical history significant No significant past medical history presents to the ED with chief complaint of Shortness of Breath  . 59-year-old female complain of pleuritic chest pain shortness of breath difficulty breathing especially when lying flat. Tried an inhaler by the primary care physician that did not improve her symptoms. Unsure about fevers or Covid exposures. There are no other complaints, changes, or physical findings at this time. PCP: None    Current Outpatient Medications   Medication Sig Dispense Refill    albuterol (PROVENTIL HFA, VENTOLIN HFA, PROAIR HFA) 90 mcg/actuation inhaler Take 2 Puffs by inhalation every four (4) hours as needed for Wheezing. 1 Inhaler 1       Past History     Past Medical History:  Past Medical History:   Diagnosis Date    Diabetes ([address]     High cholesterol     Hypertension        Past Surgical History:  Past Surgical History:   Procedure Laterality Date    HX HEMORRHOIDECTOMY      HX TONSILLECTOMY      HX WISDOM TEETH EXTRACTION         Family History:  No family history on file. Social History:  Social History     Tobacco Use    Smoking status: Never Smoker   Substance Use Topics    Alcohol use: Never    Drug use: Never       Allergies: Allergies   Allergen Reactions    Doxycycline Unknown (comments)     HIVES    Metoprolol Unknown (comments)     HIVES         Review of Systems   Review of Systems   Constitutional: Negative. Negative for chills, fatigue and fever. HENT: Negative. Negative for congestion, nosebleeds and sore throat. Eyes: Negative. Negative for pain, discharge and visual disturbance.    Respiratory: Positive for chest tightness and shortness of breath. Negative for cough. Cardiovascular: Negative for chest pain, palpitations and leg swelling. Gastrointestinal: Negative for abdominal pain, blood in stool, constipation, diarrhea, nausea and vomiting. Endocrine: Negative. Genitourinary: Negative. Negative for difficulty urinating, dysuria, pelvic pain and vaginal bleeding. Musculoskeletal: Negative. Negative for arthralgias, back pain and myalgias. Skin: Negative. Negative for rash and wound. Allergic/Immunologic: Negative. Neurological: Negative. Negative for dizziness, syncope, weakness, numbness and headaches. Hematological: Negative. Psychiatric/Behavioral: Negative. Negative for agitation, confusion and suicidal ideas. All other systems reviewed and are negative. Physical Exam   Physical Exam  Vitals and nursing note reviewed. Exam conducted with a chaperone present. Constitutional:       Appearance: Normal appearance. She is normal weight. She is ill-appearing. HENT:      Head: Normocephalic and atraumatic. Nose: Nose normal.      Mouth/Throat:      Mouth: Mucous membranes are moist.      Pharynx: Oropharynx is clear. Eyes:      Extraocular Movements: Extraocular movements intact. Conjunctiva/sclera: Conjunctivae normal.      Pupils: Pupils are equal, round, and reactive to light. Cardiovascular:      Rate and Rhythm: Normal rate and regular rhythm. Pulses: Normal pulses. Heart sounds: Normal heart sounds. Pulmonary:      Effort: Pulmonary effort is normal. Tachypnea present. No respiratory distress. Breath sounds: Normal breath sounds. Abdominal:      General: Abdomen is flat. Bowel sounds are normal. There is no distension. Palpations: Abdomen is soft. Tenderness: There is no abdominal tenderness. There is no guarding. Musculoskeletal:         General: No swelling, tenderness, deformity or signs of injury. Normal range of motion.       Cervical back: Normal range of motion and neck supple. Right lower leg: No edema. Left lower leg: No edema. Skin:     General: Skin is warm and dry. Capillary Refill: Capillary refill takes less than 2 seconds. Findings: No lesion or rash. Neurological:      General: No focal deficit present. Mental Status: She is alert and oriented to person, place, and time. Mental status is at baseline. Cranial Nerves: No cranial nerve deficit. Psychiatric:         Mood and Affect: Mood normal.         Behavior: Behavior normal.         Thought Content: Thought content normal.         Judgment: Judgment normal.         Diagnostic Study Results     Labs -   No results found for this or any previous visit (from the past 12 hour(s)). Radiologic Studies -   XR CHEST PORT    (Results Pending)     CT Results  (Last 48 hours)    None        CXR Results  (Last 48 hours)    None          Medical Decision Making and ED Course   I am the first provider for this patient. I reviewed the vital signs, available nursing notes, past medical history, past surgical history, family history and social history. Vital Signs-Reviewed the patient's vital signs. Patient Vitals for the past 12 hrs:   Pulse Resp BP SpO2   10/05/21 1004    100 %   10/05/21 0954 74 20 (!) 164/74 100 %       EKG interpretation:   EKG at 959. Normal sinus rhythm rate of 73. Left anterior fascicular block. LVH. No ST changes. No T wave inversions. Reason rule out dysrhythmia. Interpreted by ER physician. Records Reviewed: Previous Hospital chart. EMS run report      ED Course:   Initial assessment performed. The patients presenting problems have been discussed, and they are in agreement with the care plan formulated and outlined with them. I have encouraged them to ask questions as they arise throughout their visit.     Orders Placed This Encounter    COVID-19 RAPID TEST     Standing Status:   Standing     Number of Occurrences: 1     Order Specific Question:   Is this test for diagnosis or screening? Answer:   Diagnosis of ill patient     Order Specific Question:   Symptomatic for COVID-19 as defined by CDC? Answer:   Yes     Order Specific Question:   Date of Symptom Onset     Answer:   10/8/2021     Order Specific Question:   Hospitalized for COVID-19? Answer:   No     Order Specific Question:   Admitted to ICU for COVID-19? Answer:   No     Order Specific Question:   Employed in healthcare setting? Answer:   No     Order Specific Question:   Resident in a congregate (group) care setting? Answer:   No     Order Specific Question:   Pregnant? Answer:   No     Order Specific Question:   Previously tested for COVID-19? Answer:    Yes    INFLUENZA A & B AG (RAPID TEST)     Standing Status:   Standing     Number of Occurrences:   1    XR CHEST  Port (Per MD Order)     Standing Status:   Standing     Number of Occurrences:   1     Order Specific Question:   Reason for Exam     Answer:   Shortness of breath    CBC WITH AUTOMATED DIFF     Standing Status:   Standing     Number of Occurrences:   1    METABOLIC PANEL, COMPREHENSIVE     Standing Status:   Standing     Number of Occurrences:   1    CK W/ REFLX CKMB     Standing Status:   Standing     Number of Occurrences:   1    TROPONIN I     Standing Status:   Standing     Number of Occurrences:   1    BNP     Standing Status:   Standing     Number of Occurrences:   1    PULSE OXIMETRY SPOT CHECK     Standing Status:   Standing     Number of Occurrences:   1    EKG, 12 LEAD, INITIAL     Standing Status:   Standing     Number of Occurrences:   1     Order Specific Question:   Reason for Exam:     Answer:   Shortness of breath    SALINE LOCK IV ONE TIME STAT     Standing Status:   Standing     Number of Occurrences:   1                 Provider Notes (Medical Decision Making):   2year-old female with active chest pain shortness of breath not relieved with home inhaler with a benign exam.  Will evaluate for cardiac so serial troponins given her risk factors. Unable to obtain a CTA because she cannot lie flat. Rosanne Mcclain for admission to the hospitalist for pain control and reevaluation. Consults    Dr Chloé Yung admit               Procedures                       Disposition       Emergency Department Disposition:  admit      Diagnosis     Clinical Impression: chest pain  Attestations:    Michael Angela MD    Please note that this dictation was completed with [employer], the computer voice recognition software. Quite often unanticipated grammatical, syntax, homophones, and other interpretive errors are inadvertently transcribed by the computer software. Please disregard these errors. Please excuse any errors that have escaped final proofreading. Thank you. normal...

## 2023-06-19 ENCOUNTER — APPOINTMENT (OUTPATIENT)
Facility: HOSPITAL | Age: 73
DRG: 189 | End: 2023-06-19
Payer: MEDICARE

## 2023-06-19 ENCOUNTER — HOSPITAL ENCOUNTER (INPATIENT)
Facility: HOSPITAL | Age: 73
LOS: 3 days | Discharge: HOME | DRG: 189 | End: 2023-06-22
Attending: EMERGENCY MEDICINE | Admitting: FAMILY MEDICINE
Payer: MEDICARE

## 2023-06-19 DIAGNOSIS — R06.09 DOE (DYSPNEA ON EXERTION): ICD-10-CM

## 2023-06-19 DIAGNOSIS — R09.02 HYPOXIA: Primary | ICD-10-CM

## 2023-06-19 PROBLEM — R06.02 SHORTNESS OF BREATH: Status: ACTIVE | Noted: 2023-06-19

## 2023-06-19 LAB
ALBUMIN SERPL-MCNC: 3 G/DL (ref 3.5–5)
ALBUMIN/GLOB SERPL: 1 (ref 1.1–2.2)
ALP SERPL-CCNC: 102 U/L (ref 45–117)
ALT SERPL-CCNC: 19 U/L (ref 12–78)
ANION GAP SERPL CALC-SCNC: 0 MMOL/L (ref 5–15)
AST SERPL W P-5'-P-CCNC: 19 U/L (ref 15–37)
BASOPHILS # BLD: 0 K/UL (ref 0–0.1)
BASOPHILS NFR BLD: 1 % (ref 0–1)
BILIRUB SERPL-MCNC: 0.5 MG/DL (ref 0.2–1)
BNP SERPL-MCNC: 284 PG/ML
BUN SERPL-MCNC: 19 MG/DL (ref 6–20)
BUN/CREAT SERPL: 32 (ref 12–20)
CA-I BLD-MCNC: 10 MG/DL (ref 8.5–10.1)
CHLORIDE SERPL-SCNC: 107 MMOL/L (ref 97–108)
CO2 SERPL-SCNC: 37 MMOL/L (ref 21–32)
CREAT SERPL-MCNC: 0.59 MG/DL (ref 0.55–1.02)
DIFFERENTIAL METHOD BLD: ABNORMAL
EKG ATRIAL RATE: 60 BPM
EKG DIAGNOSIS: NORMAL
EKG P AXIS: 45 DEGREES
EKG P-R INTERVAL: 174 MS
EKG Q-T INTERVAL: 426 MS
EKG QRS DURATION: 116 MS
EKG QTC CALCULATION (BAZETT): 426 MS
EKG R AXIS: -50 DEGREES
EKG T AXIS: 52 DEGREES
EKG VENTRICULAR RATE: 60 BPM
EOSINOPHIL # BLD: 0.1 K/UL (ref 0–0.4)
EOSINOPHIL NFR BLD: 2 % (ref 0–7)
ERYTHROCYTE [DISTWIDTH] IN BLOOD BY AUTOMATED COUNT: 12.6 % (ref 11.5–14.5)
GLOBULIN SER CALC-MCNC: 3.1 G/DL (ref 2–4)
GLUCOSE BLD STRIP.AUTO-MCNC: 108 MG/DL (ref 65–100)
GLUCOSE SERPL-MCNC: 113 MG/DL (ref 65–100)
HCT VFR BLD AUTO: 34.6 % (ref 35–47)
HGB BLD-MCNC: 10.5 G/DL (ref 11.5–16)
IMM GRANULOCYTES # BLD AUTO: 0 K/UL (ref 0–0.04)
IMM GRANULOCYTES NFR BLD AUTO: 0 % (ref 0–0.5)
LYMPHOCYTES # BLD: 1.5 K/UL (ref 0.8–3.5)
LYMPHOCYTES NFR BLD: 45 % (ref 12–49)
MAGNESIUM SERPL-MCNC: 2 MG/DL (ref 1.6–2.4)
MCH RBC QN AUTO: 27.8 PG (ref 26–34)
MCHC RBC AUTO-ENTMCNC: 30.3 G/DL (ref 30–36.5)
MCV RBC AUTO: 91.5 FL (ref 80–99)
MONOCYTES # BLD: 0.4 K/UL (ref 0–1)
MONOCYTES NFR BLD: 12 % (ref 5–13)
NEUTS SEG # BLD: 1.3 K/UL (ref 1.8–8)
NEUTS SEG NFR BLD: 40 % (ref 32–75)
NRBC # BLD: 0 K/UL (ref 0–0.01)
NRBC BLD-RTO: 0 PER 100 WBC
PERFORMED BY:: ABNORMAL
PLATELET # BLD AUTO: 181 K/UL (ref 150–400)
PMV BLD AUTO: 12 FL (ref 8.9–12.9)
POTASSIUM SERPL-SCNC: 4.8 MMOL/L (ref 3.5–5.1)
PROT SERPL-MCNC: 6.1 G/DL (ref 6.4–8.2)
RBC # BLD AUTO: 3.78 M/UL (ref 3.8–5.2)
SODIUM SERPL-SCNC: 144 MMOL/L (ref 136–145)
TROPONIN I SERPL HS-MCNC: 46 NG/L (ref 0–51)
TROPONIN I SERPL HS-MCNC: 49 NG/L (ref 0–51)
WBC # BLD AUTO: 3.3 K/UL (ref 3.6–11)

## 2023-06-19 PROCEDURE — 71275 CT ANGIOGRAPHY CHEST: CPT

## 2023-06-19 PROCEDURE — 6370000000 HC RX 637 (ALT 250 FOR IP): Performed by: FAMILY MEDICINE

## 2023-06-19 PROCEDURE — 6360000002 HC RX W HCPCS: Performed by: FAMILY MEDICINE

## 2023-06-19 PROCEDURE — 71045 X-RAY EXAM CHEST 1 VIEW: CPT

## 2023-06-19 PROCEDURE — 85025 COMPLETE CBC W/AUTO DIFF WBC: CPT

## 2023-06-19 PROCEDURE — 6360000004 HC RX CONTRAST MEDICATION: Performed by: EMERGENCY MEDICINE

## 2023-06-19 PROCEDURE — 82962 GLUCOSE BLOOD TEST: CPT

## 2023-06-19 PROCEDURE — 1100000000 HC RM PRIVATE

## 2023-06-19 PROCEDURE — 80053 COMPREHEN METABOLIC PANEL: CPT

## 2023-06-19 PROCEDURE — 83735 ASSAY OF MAGNESIUM: CPT

## 2023-06-19 PROCEDURE — 83036 HEMOGLOBIN GLYCOSYLATED A1C: CPT

## 2023-06-19 PROCEDURE — 83880 ASSAY OF NATRIURETIC PEPTIDE: CPT

## 2023-06-19 PROCEDURE — 36415 COLL VENOUS BLD VENIPUNCTURE: CPT

## 2023-06-19 PROCEDURE — 99285 EMERGENCY DEPT VISIT HI MDM: CPT

## 2023-06-19 PROCEDURE — 84484 ASSAY OF TROPONIN QUANT: CPT

## 2023-06-19 PROCEDURE — 93005 ELECTROCARDIOGRAM TRACING: CPT | Performed by: EMERGENCY MEDICINE

## 2023-06-19 PROCEDURE — 94640 AIRWAY INHALATION TREATMENT: CPT

## 2023-06-19 RX ORDER — IPRATROPIUM BROMIDE AND ALBUTEROL SULFATE 2.5; .5 MG/3ML; MG/3ML
1 SOLUTION RESPIRATORY (INHALATION) EVERY 4 HOURS PRN
Status: DISCONTINUED | OUTPATIENT
Start: 2023-06-19 | End: 2023-06-21

## 2023-06-19 RX ORDER — SODIUM CHLORIDE 0.9 % (FLUSH) 0.9 %
5-40 SYRINGE (ML) INJECTION PRN
Status: DISCONTINUED | OUTPATIENT
Start: 2023-06-19 | End: 2023-06-22 | Stop reason: HOSPADM

## 2023-06-19 RX ORDER — LEVOFLOXACIN 5 MG/ML
750 INJECTION, SOLUTION INTRAVENOUS EVERY 24 HOURS
Status: DISCONTINUED | OUTPATIENT
Start: 2023-06-19 | End: 2023-06-21

## 2023-06-19 RX ORDER — FUROSEMIDE 40 MG/1
40 TABLET ORAL DAILY
Status: DISCONTINUED | OUTPATIENT
Start: 2023-06-19 | End: 2023-06-22 | Stop reason: HOSPADM

## 2023-06-19 RX ORDER — GABAPENTIN 100 MG/1
100 CAPSULE ORAL 3 TIMES DAILY
Status: DISCONTINUED | OUTPATIENT
Start: 2023-06-19 | End: 2023-06-21

## 2023-06-19 RX ORDER — LISINOPRIL 40 MG/1
40 TABLET ORAL DAILY
Status: DISCONTINUED | OUTPATIENT
Start: 2023-06-19 | End: 2023-06-22 | Stop reason: HOSPADM

## 2023-06-19 RX ORDER — ACETAMINOPHEN 325 MG/1
650 TABLET ORAL EVERY 6 HOURS PRN
Status: DISCONTINUED | OUTPATIENT
Start: 2023-06-19 | End: 2023-06-22 | Stop reason: HOSPADM

## 2023-06-19 RX ORDER — ATORVASTATIN CALCIUM 40 MG/1
40 TABLET, FILM COATED ORAL NIGHTLY
Status: DISCONTINUED | OUTPATIENT
Start: 2023-06-19 | End: 2023-06-22 | Stop reason: HOSPADM

## 2023-06-19 RX ORDER — BUDESONIDE AND FORMOTEROL FUMARATE DIHYDRATE 80; 4.5 UG/1; UG/1
2 AEROSOL RESPIRATORY (INHALATION) 2 TIMES DAILY
Status: DISCONTINUED | OUTPATIENT
Start: 2023-06-19 | End: 2023-06-22 | Stop reason: HOSPADM

## 2023-06-19 RX ORDER — DILTIAZEM HYDROCHLORIDE 60 MG/1
120 CAPSULE, EXTENDED RELEASE ORAL DAILY
Status: DISCONTINUED | OUTPATIENT
Start: 2023-06-19 | End: 2023-06-22 | Stop reason: HOSPADM

## 2023-06-19 RX ORDER — SODIUM CHLORIDE 0.9 % (FLUSH) 0.9 %
5-40 SYRINGE (ML) INJECTION EVERY 12 HOURS SCHEDULED
Status: DISCONTINUED | OUTPATIENT
Start: 2023-06-19 | End: 2023-06-22 | Stop reason: HOSPADM

## 2023-06-19 RX ORDER — ONDANSETRON 2 MG/ML
4 INJECTION INTRAMUSCULAR; INTRAVENOUS EVERY 6 HOURS PRN
Status: DISCONTINUED | OUTPATIENT
Start: 2023-06-19 | End: 2023-06-22 | Stop reason: HOSPADM

## 2023-06-19 RX ORDER — POLYETHYLENE GLYCOL 3350 17 G/17G
17 POWDER, FOR SOLUTION ORAL DAILY PRN
Status: DISCONTINUED | OUTPATIENT
Start: 2023-06-19 | End: 2023-06-22 | Stop reason: HOSPADM

## 2023-06-19 RX ORDER — ONDANSETRON 4 MG/1
4 TABLET, ORALLY DISINTEGRATING ORAL EVERY 8 HOURS PRN
Status: DISCONTINUED | OUTPATIENT
Start: 2023-06-19 | End: 2023-06-22 | Stop reason: HOSPADM

## 2023-06-19 RX ORDER — IPRATROPIUM BROMIDE AND ALBUTEROL SULFATE 2.5; .5 MG/3ML; MG/3ML
1 SOLUTION RESPIRATORY (INHALATION)
Status: DISCONTINUED | OUTPATIENT
Start: 2023-06-19 | End: 2023-06-21

## 2023-06-19 RX ORDER — FAMOTIDINE 20 MG/1
40 TABLET, FILM COATED ORAL 2 TIMES DAILY
Status: DISCONTINUED | OUTPATIENT
Start: 2023-06-19 | End: 2023-06-22 | Stop reason: HOSPADM

## 2023-06-19 RX ORDER — INSULIN LISPRO 100 [IU]/ML
0-16 INJECTION, SOLUTION INTRAVENOUS; SUBCUTANEOUS
Status: DISCONTINUED | OUTPATIENT
Start: 2023-06-19 | End: 2023-06-22 | Stop reason: HOSPADM

## 2023-06-19 RX ORDER — ACETAMINOPHEN 650 MG/1
650 SUPPOSITORY RECTAL EVERY 6 HOURS PRN
Status: DISCONTINUED | OUTPATIENT
Start: 2023-06-19 | End: 2023-06-22 | Stop reason: HOSPADM

## 2023-06-19 RX ORDER — DEXTROSE MONOHYDRATE 100 MG/ML
INJECTION, SOLUTION INTRAVENOUS CONTINUOUS PRN
Status: DISCONTINUED | OUTPATIENT
Start: 2023-06-19 | End: 2023-06-22 | Stop reason: HOSPADM

## 2023-06-19 RX ORDER — INSULIN LISPRO 100 [IU]/ML
0-4 INJECTION, SOLUTION INTRAVENOUS; SUBCUTANEOUS NIGHTLY
Status: DISCONTINUED | OUTPATIENT
Start: 2023-06-19 | End: 2023-06-22 | Stop reason: HOSPADM

## 2023-06-19 RX ORDER — FUROSEMIDE 20 MG/1
20 TABLET ORAL DAILY
COMMUNITY

## 2023-06-19 RX ORDER — SODIUM CHLORIDE 9 MG/ML
INJECTION, SOLUTION INTRAVENOUS PRN
Status: DISCONTINUED | OUTPATIENT
Start: 2023-06-19 | End: 2023-06-22 | Stop reason: HOSPADM

## 2023-06-19 RX ADMIN — METHYLPREDNISOLONE SODIUM SUCCINATE 40 MG: 40 INJECTION, POWDER, FOR SOLUTION INTRAMUSCULAR; INTRAVENOUS at 20:45

## 2023-06-19 RX ADMIN — IOPAMIDOL 100 ML: 755 INJECTION, SOLUTION INTRAVENOUS at 17:40

## 2023-06-19 RX ADMIN — FUROSEMIDE 40 MG: 40 TABLET ORAL at 20:40

## 2023-06-19 RX ADMIN — LEVOFLOXACIN 750 MG: 5 INJECTION, SOLUTION INTRAVENOUS at 22:03

## 2023-06-19 RX ADMIN — APIXABAN 5 MG: 5 TABLET, FILM COATED ORAL at 20:40

## 2023-06-19 RX ADMIN — IPRATROPIUM BROMIDE AND ALBUTEROL SULFATE 1 DOSE: 2.5; .5 SOLUTION RESPIRATORY (INHALATION) at 21:12

## 2023-06-19 RX ADMIN — ATORVASTATIN CALCIUM 40 MG: 40 TABLET, FILM COATED ORAL at 20:40

## 2023-06-19 RX ADMIN — BUDESONIDE AND FORMOTEROL FUMARATE DIHYDRATE 2 PUFF: 80; 4.5 AEROSOL RESPIRATORY (INHALATION) at 21:23

## 2023-06-19 ASSESSMENT — PAIN - FUNCTIONAL ASSESSMENT: PAIN_FUNCTIONAL_ASSESSMENT: NONE - DENIES PAIN

## 2023-06-19 ASSESSMENT — LIFESTYLE VARIABLES
HOW MANY STANDARD DRINKS CONTAINING ALCOHOL DO YOU HAVE ON A TYPICAL DAY: PATIENT DOES NOT DRINK
HOW OFTEN DO YOU HAVE A DRINK CONTAINING ALCOHOL: NEVER

## 2023-06-19 NOTE — ED TRIAGE NOTES
Recently dx with pneumonia approx 2 weeks ago was placed on oxygen during this period. Per pt shortness of breath continues. On 2 L. Low blood pressures. Hx of a fib. Also reports being \"lightheaded.

## 2023-06-19 NOTE — ED NOTES
Pt placed on continuous cardiac monitoring, pulse ox, and blood pressure monitoring at this time.         Katherin Reyes RN  06/19/23 2930

## 2023-06-19 NOTE — H&P
History and Physical    NAME:   Karel Ramirez   :  1950   MRN:  [id number]     Date/Time: 2023 7:44 PM    Patient PCP: Sydni Gelason MD  ______________________________________________________________________       Subjective:     CHIEF COMPLAINT:     Shortness of breath        HISTORY OF PRESENT ILLNESS:       Patient is a 75y.o. year old female history of diabetes hypertension hyperlipidemia chronic A-fib history of COVID in  came to emergency room because of shortness of breath advised the family since patient have a COVID patient have issue of shortness of breath off-and-on patient was recently discharged from the hospital with pneumonia, came today with chest pain shortness of breath blood pressure was low seen by the ER physician and recommended patient to be admitted for further evaluation and treatment  Blood work was showing BNP was 285 troponin was negative  CT T of the chest negative    Past Medical History:   Diagnosis Date    Diabetes ([facility] [address]     High cholesterol     Hypertension         Past Surgical History:   Procedure Laterality Date    BREAST BIOPSY Left 2023    LINDSAY 3D JOAQUIN STEREO VAC BX BREAST LT 1ST LES W/CLIP [address] 2023 [facility]    HEMORRHOID SURGERY      LINDSAY STEROTACTIC LOC BREAST BIOPSY LEFT Left 2023    LINDSAY STEROTACTIC LOC BREAST BIOPSY LEFT 2023 [facility]    TONSILLECTOMY      WISDOM TOOTH EXTRACTION         Social History     Tobacco Use    Smoking status: Never    Smokeless tobacco: Never   Substance Use Topics    Alcohol use: Never        No family history on file. Allergies   Allergen Reactions    Doxycycline      Other reaction(s): Unknown (comments)  HIVES    Metoprolol      Other reaction(s): Unknown (comments)  HIVES        Prior to Admission medications    Medication Sig Start Date End Date Taking? Authorizing Provider   apixaban (ELIQUIS) 5 MG TABS tablet Eliquis 5 mg tablet   Take 1 tablet twice a day by oral route.     Ar [employer]

## 2023-06-19 NOTE — ED PROVIDER NOTES
[facility]  EMERGENCY DEPARTMENT HISTORY AND PHYSICAL EXAM      Date: 6/19/2023  Patient Name: Erica Humphreys  MRN: [id number]  [place] 1950  Date of evaluation: 6/19/2023  Provider: Reny Olivo MD   Note Started: 4:42 PM EDT 6/19/23    HISTORY OF PRESENT ILLNESS     Chief Complaint   Patient presents with    Shortness of Breath    Hypotension       History Provided By: Patient    HPI: Erica Humphreys is a 75 y.o. female past medical history of diabetes, hypertension and high cholesterol presents for evaluation of shortness of breath. Has also been taking her blood pressure at home and this is lower than normal however within normal range. She was discharged home on 2 L of oxygen which she is requiring all the time now. She was able to wean it off for several hours at a time prior to the last 2 days. She has not had any fevers. She does have a history of A-fib so she is on Eliquis. No history of VTE. PAST MEDICAL HISTORY   Past Medical History:  Past Medical History:   Diagnosis Date    Diabetes ([address]     High cholesterol     Hypertension        Past Surgical History:  Past Surgical History:   Procedure Laterality Date    BREAST BIOPSY Left 4/6/2023    LINDSAY 3D JOAQUIN STEREO VAC BX BREAST LT 1ST LES W/[facility] 4/6/2023 [facility] RAD [facility]MO    HEMORRHOID SURGERY      LINDSAY STEROTACTIC LOC BREAST BIOPSY LEFT Left 4/6/2023    LINDSAY STEROTACTIC LOC BREAST BIOPSY LEFT 4/6/2023 [facility] RAD [facility]AMMO    TONSILLECTOMY      WISDOM TOOTH EXTRACTION         Family History:  No family history on file. Social History:  Social History     Tobacco Use    Smoking status: Never    Smokeless tobacco: Never   Substance Use Topics    Alcohol use: Never    Drug use: Never       Allergies:   Allergies   Allergen Reactions    Doxycycline      Other reaction(s): Unknown (comments)  HIVES    Metoprolol      Other reaction(s): Unknown (comments)  HIVES       PCP: Johana Linda

## 2023-06-19 NOTE — ED NOTES
Assumed care of patient. Patient resting in bed and denies any pain or shortness of breath at this time. Wearing 2L O2 nasal cannula and notes that she has been wearing oxygen at home for a few weeks.      Vazquez Mckenzie RN  [birth year] 1914

## 2023-06-20 LAB
ALBUMIN SERPL-MCNC: 3.3 G/DL (ref 3.5–5)
ALBUMIN/GLOB SERPL: 0.9 (ref 1.1–2.2)
ALP SERPL-CCNC: 92 U/L (ref 45–117)
ALT SERPL-CCNC: 17 U/L (ref 12–78)
ANION GAP SERPL CALC-SCNC: 3 MMOL/L (ref 5–15)
AST SERPL W P-5'-P-CCNC: 12 U/L (ref 15–37)
BASOPHILS # BLD: 0 K/UL (ref 0–0.1)
BASOPHILS NFR BLD: 0 % (ref 0–1)
BILIRUB SERPL-MCNC: 0.5 MG/DL (ref 0.2–1)
BUN SERPL-MCNC: 15 MG/DL (ref 6–20)
BUN/CREAT SERPL: 28 (ref 12–20)
CA-I BLD-MCNC: 10.3 MG/DL (ref 8.5–10.1)
CHLORIDE SERPL-SCNC: 103 MMOL/L (ref 97–108)
CO2 SERPL-SCNC: 35 MMOL/L (ref 21–32)
CREAT SERPL-MCNC: 0.53 MG/DL (ref 0.55–1.02)
DIFFERENTIAL METHOD BLD: ABNORMAL
EOSINOPHIL # BLD: 0 K/UL (ref 0–0.4)
EOSINOPHIL NFR BLD: 0 % (ref 0–7)
ERYTHROCYTE [DISTWIDTH] IN BLOOD BY AUTOMATED COUNT: 12.3 % (ref 11.5–14.5)
EST. AVERAGE GLUCOSE BLD GHB EST-MCNC: 117 MG/DL
GLOBULIN SER CALC-MCNC: 3.7 G/DL (ref 2–4)
GLUCOSE BLD STRIP.AUTO-MCNC: 163 MG/DL (ref 65–100)
GLUCOSE BLD STRIP.AUTO-MCNC: 207 MG/DL (ref 65–100)
GLUCOSE BLD STRIP.AUTO-MCNC: 229 MG/DL (ref 65–100)
GLUCOSE BLD STRIP.AUTO-MCNC: 240 MG/DL (ref 65–100)
GLUCOSE BLD STRIP.AUTO-MCNC: 279 MG/DL (ref 65–100)
GLUCOSE SERPL-MCNC: 177 MG/DL (ref 65–100)
HBA1C MFR BLD: 5.7 % (ref 4–5.6)
HCT VFR BLD AUTO: 38.3 % (ref 35–47)
HGB BLD-MCNC: 11.6 G/DL (ref 11.5–16)
IMM GRANULOCYTES # BLD AUTO: 0 K/UL
IMM GRANULOCYTES NFR BLD AUTO: 0 %
LYMPHOCYTES # BLD: 0.6 K/UL (ref 0.8–3.5)
LYMPHOCYTES NFR BLD: 20 % (ref 12–49)
MCH RBC QN AUTO: 27.8 PG (ref 26–34)
MCHC RBC AUTO-ENTMCNC: 30.3 G/DL (ref 30–36.5)
MCV RBC AUTO: 91.6 FL (ref 80–99)
MONOCYTES # BLD: 0 K/UL (ref 0–1)
MONOCYTES NFR BLD: 0 % (ref 5–13)
NEUTS SEG # BLD: 2.3 K/UL (ref 1.8–8)
NEUTS SEG NFR BLD: 78 % (ref 32–75)
NRBC # BLD: 0 K/UL (ref 0–0.01)
NRBC BLD-RTO: 0 PER 100 WBC
OTHER CELLS NFR BLD MANUAL: 2 %
PERFORMED BY:: ABNORMAL
PLATELET # BLD AUTO: 170 K/UL (ref 150–400)
PMV BLD AUTO: 11.9 FL (ref 8.9–12.9)
POTASSIUM SERPL-SCNC: 3.8 MMOL/L (ref 3.5–5.1)
PROT SERPL-MCNC: 7 G/DL (ref 6.4–8.2)
RBC # BLD AUTO: 4.18 M/UL (ref 3.8–5.2)
RBC MORPH BLD: ABNORMAL
SODIUM SERPL-SCNC: 141 MMOL/L (ref 136–145)
WBC # BLD AUTO: 2.9 K/UL (ref 3.6–11)

## 2023-06-20 PROCEDURE — 6370000000 HC RX 637 (ALT 250 FOR IP): Performed by: FAMILY MEDICINE

## 2023-06-20 PROCEDURE — 94640 AIRWAY INHALATION TREATMENT: CPT

## 2023-06-20 PROCEDURE — 80053 COMPREHEN METABOLIC PANEL: CPT

## 2023-06-20 PROCEDURE — 94761 N-INVAS EAR/PLS OXIMETRY MLT: CPT

## 2023-06-20 PROCEDURE — 85025 COMPLETE CBC W/AUTO DIFF WBC: CPT

## 2023-06-20 PROCEDURE — 2700000000 HC OXYGEN THERAPY PER DAY

## 2023-06-20 PROCEDURE — 97161 PT EVAL LOW COMPLEX 20 MIN: CPT

## 2023-06-20 PROCEDURE — 1100000000 HC RM PRIVATE

## 2023-06-20 PROCEDURE — 82962 GLUCOSE BLOOD TEST: CPT

## 2023-06-20 PROCEDURE — 2580000003 HC RX 258: Performed by: FAMILY MEDICINE

## 2023-06-20 PROCEDURE — 97116 GAIT TRAINING THERAPY: CPT

## 2023-06-20 PROCEDURE — 36415 COLL VENOUS BLD VENIPUNCTURE: CPT

## 2023-06-20 PROCEDURE — 6360000002 HC RX W HCPCS: Performed by: FAMILY MEDICINE

## 2023-06-20 RX ADMIN — IPRATROPIUM BROMIDE AND ALBUTEROL SULFATE 1 DOSE: 2.5; .5 SOLUTION RESPIRATORY (INHALATION) at 17:26

## 2023-06-20 RX ADMIN — APIXABAN 5 MG: 5 TABLET, FILM COATED ORAL at 08:39

## 2023-06-20 RX ADMIN — IPRATROPIUM BROMIDE AND ALBUTEROL SULFATE 1 DOSE: 2.5; .5 SOLUTION RESPIRATORY (INHALATION) at 08:59

## 2023-06-20 RX ADMIN — METHYLPREDNISOLONE SODIUM SUCCINATE 40 MG: 40 INJECTION, POWDER, FOR SOLUTION INTRAMUSCULAR; INTRAVENOUS at 22:21

## 2023-06-20 RX ADMIN — METHYLPREDNISOLONE SODIUM SUCCINATE 40 MG: 40 INJECTION, POWDER, FOR SOLUTION INTRAMUSCULAR; INTRAVENOUS at 08:37

## 2023-06-20 RX ADMIN — FUROSEMIDE 40 MG: 40 TABLET ORAL at 08:39

## 2023-06-20 RX ADMIN — LISINOPRIL 40 MG: 40 TABLET ORAL at 08:38

## 2023-06-20 RX ADMIN — APIXABAN 5 MG: 5 TABLET, FILM COATED ORAL at 20:28

## 2023-06-20 RX ADMIN — IPRATROPIUM BROMIDE AND ALBUTEROL SULFATE 1 DOSE: 2.5; .5 SOLUTION RESPIRATORY (INHALATION) at 19:10

## 2023-06-20 RX ADMIN — METHYLPREDNISOLONE SODIUM SUCCINATE 40 MG: 40 INJECTION, POWDER, FOR SOLUTION INTRAMUSCULAR; INTRAVENOUS at 16:52

## 2023-06-20 RX ADMIN — LEVOFLOXACIN 750 MG: 5 INJECTION, SOLUTION INTRAVENOUS at 20:28

## 2023-06-20 RX ADMIN — BUDESONIDE AND FORMOTEROL FUMARATE DIHYDRATE 2 PUFF: 80; 4.5 AEROSOL RESPIRATORY (INHALATION) at 09:00

## 2023-06-20 RX ADMIN — DILTIAZEM HYDROCHLORIDE 120 MG: 60 CAPSULE, EXTENDED RELEASE ORAL at 08:38

## 2023-06-20 RX ADMIN — INSULIN LISPRO 4 UNITS: 100 INJECTION, SOLUTION INTRAVENOUS; SUBCUTANEOUS at 16:52

## 2023-06-20 RX ADMIN — ATORVASTATIN CALCIUM 40 MG: 40 TABLET, FILM COATED ORAL at 20:28

## 2023-06-20 RX ADMIN — INSULIN LISPRO 4 UNITS: 100 INJECTION, SOLUTION INTRAVENOUS; SUBCUTANEOUS at 12:02

## 2023-06-20 RX ADMIN — FAMOTIDINE 40 MG: 20 TABLET ORAL at 20:28

## 2023-06-20 RX ADMIN — METHYLPREDNISOLONE SODIUM SUCCINATE 40 MG: 40 INJECTION, POWDER, FOR SOLUTION INTRAMUSCULAR; INTRAVENOUS at 01:15

## 2023-06-20 RX ADMIN — IPRATROPIUM BROMIDE AND ALBUTEROL SULFATE 1 DOSE: 2.5; .5 SOLUTION RESPIRATORY (INHALATION) at 11:34

## 2023-06-20 RX ADMIN — SODIUM CHLORIDE, PRESERVATIVE FREE 10 ML: 5 INJECTION INTRAVENOUS at 08:42

## 2023-06-20 RX ADMIN — SODIUM CHLORIDE, PRESERVATIVE FREE 10 ML: 5 INJECTION INTRAVENOUS at 20:30

## 2023-06-20 RX ADMIN — BUDESONIDE AND FORMOTEROL FUMARATE DIHYDRATE 2 PUFF: 80; 4.5 AEROSOL RESPIRATORY (INHALATION) at 19:10

## 2023-06-20 NOTE — ED NOTES
Patient reports that all of her blood pressure medications were recently discontinued by her doctor and states that she does not want to take them if she does not have to. Blood pressure medications held at this time. Provider notified.      [facility]  37/38/58 [id number]

## 2023-06-20 NOTE — CONSULTS
PULMONARY CONSULT  [facility]    Name: Marita Tolentino MRN: [id number]   : 1950 [facility]   Date: 2023  Admission date: 2023 Hospital Day: 2       HPI:     Patient Active Problem List   Diagnosis    SOB (shortness of breath)    Dyspnea    Partial small bowel obstruction (HCC)    SBO (small bowel obstruction) (HCC)    Shortness of breath             [x] High complexity decision making was performed  [x] See my orders for details      Subjective/Initial History:     I was asked by Fiona Griffith MD to see Marita Tolentino  a 75 y.o.    female in consultation     Excerpts from admission 2023 or consult notes as follows:   70-year lady came in because of shortness of breath dyspnea and chest pain she has past medical history of chronic A-fib hypertension hyperlipidemia diabetes mellitus personal history of COVID-2021 she came in because shortness of breath and dyspnea, she is a lifetime non-smoker so admitted and pulm consult was called, CAT scan of the chest was done was negative for embolism but shows chronic scarring most likely secondary to COVID       Allergies   Allergen Reactions    Doxycycline      Other reaction(s): Unknown (comments)  HIVES    Metoprolol      Other reaction(s): Unknown (comments)  HIVES        MAR reviewed and pertinent medications noted or modified as needed     Current Facility-Administered Medications   Medication Dose Route Frequency Provider Last Rate Last Admin    apixaban (ELIQUIS) tablet 5 mg  5 mg Oral BID Chichi Núñez MD   5 mg at 23 0839    dilTIAZem (CARDIZEM 12 HR) extended release capsule 120 mg  120 mg Oral Daily Chichi Núñez MD   120 mg at 23 0838    famotidine (PEPCID) tablet 40 mg  40 mg Oral BID Chichi Núñez MD        lisinopril (PRINIVIL;ZESTRIL) tablet 40 mg  40 mg Oral Daily Chichi Núñez MD   40 mg at 23 0838    atorvastatin (LIPITOR) tablet 40 mg  40 mg Oral

## 2023-06-21 ENCOUNTER — APPOINTMENT (OUTPATIENT)
Facility: HOSPITAL | Age: 73
DRG: 189 | End: 2023-06-21
Attending: FAMILY MEDICINE
Payer: MEDICARE

## 2023-06-21 LAB
ALBUMIN SERPL-MCNC: 2.9 G/DL (ref 3.5–5)
ALBUMIN/GLOB SERPL: 0.8 (ref 1.1–2.2)
ALP SERPL-CCNC: 88 U/L (ref 45–117)
ALT SERPL-CCNC: 17 U/L (ref 12–78)
ANION GAP SERPL CALC-SCNC: 3 MMOL/L (ref 5–15)
AST SERPL W P-5'-P-CCNC: 11 U/L (ref 15–37)
BILIRUB SERPL-MCNC: 0.4 MG/DL (ref 0.2–1)
BNP SERPL-MCNC: 877 PG/ML
BUN SERPL-MCNC: 18 MG/DL (ref 6–20)
BUN/CREAT SERPL: 33 (ref 12–20)
CA-I BLD-MCNC: 10.4 MG/DL (ref 8.5–10.1)
CHLORIDE SERPL-SCNC: 101 MMOL/L (ref 97–108)
CO2 SERPL-SCNC: 36 MMOL/L (ref 21–32)
CREAT SERPL-MCNC: 0.54 MG/DL (ref 0.55–1.02)
ERYTHROCYTE [DISTWIDTH] IN BLOOD BY AUTOMATED COUNT: 12.4 % (ref 11.5–14.5)
GLOBULIN SER CALC-MCNC: 3.6 G/DL (ref 2–4)
GLUCOSE BLD STRIP.AUTO-MCNC: 189 MG/DL (ref 65–100)
GLUCOSE BLD STRIP.AUTO-MCNC: 190 MG/DL (ref 65–100)
GLUCOSE BLD STRIP.AUTO-MCNC: 244 MG/DL (ref 65–100)
GLUCOSE BLD STRIP.AUTO-MCNC: 257 MG/DL (ref 65–100)
GLUCOSE SERPL-MCNC: 223 MG/DL (ref 65–100)
HCT VFR BLD AUTO: 36 % (ref 35–47)
HGB BLD-MCNC: 11.1 G/DL (ref 11.5–16)
MCH RBC QN AUTO: 27.8 PG (ref 26–34)
MCHC RBC AUTO-ENTMCNC: 30.8 G/DL (ref 30–36.5)
MCV RBC AUTO: 90.2 FL (ref 80–99)
NRBC # BLD: 0 K/UL (ref 0–0.01)
NRBC BLD-RTO: 0 PER 100 WBC
PERFORMED BY:: ABNORMAL
PLATELET # BLD AUTO: 177 K/UL (ref 150–400)
PMV BLD AUTO: 11.9 FL (ref 8.9–12.9)
POTASSIUM SERPL-SCNC: 4.3 MMOL/L (ref 3.5–5.1)
PROT SERPL-MCNC: 6.5 G/DL (ref 6.4–8.2)
RBC # BLD AUTO: 3.99 M/UL (ref 3.8–5.2)
SODIUM SERPL-SCNC: 140 MMOL/L (ref 136–145)
WBC # BLD AUTO: 6.3 K/UL (ref 3.6–11)

## 2023-06-21 PROCEDURE — 6370000000 HC RX 637 (ALT 250 FOR IP): Performed by: FAMILY MEDICINE

## 2023-06-21 PROCEDURE — 93306 TTE W/DOPPLER COMPLETE: CPT

## 2023-06-21 PROCEDURE — 2700000000 HC OXYGEN THERAPY PER DAY

## 2023-06-21 PROCEDURE — 1100000000 HC RM PRIVATE

## 2023-06-21 PROCEDURE — 97530 THERAPEUTIC ACTIVITIES: CPT

## 2023-06-21 PROCEDURE — 6360000002 HC RX W HCPCS: Performed by: FAMILY MEDICINE

## 2023-06-21 PROCEDURE — 80053 COMPREHEN METABOLIC PANEL: CPT

## 2023-06-21 PROCEDURE — 82962 GLUCOSE BLOOD TEST: CPT

## 2023-06-21 PROCEDURE — 83880 ASSAY OF NATRIURETIC PEPTIDE: CPT

## 2023-06-21 PROCEDURE — 94761 N-INVAS EAR/PLS OXIMETRY MLT: CPT

## 2023-06-21 PROCEDURE — 94640 AIRWAY INHALATION TREATMENT: CPT

## 2023-06-21 PROCEDURE — 85027 COMPLETE CBC AUTOMATED: CPT

## 2023-06-21 PROCEDURE — 36415 COLL VENOUS BLD VENIPUNCTURE: CPT

## 2023-06-21 PROCEDURE — 2580000003 HC RX 258: Performed by: FAMILY MEDICINE

## 2023-06-21 RX ORDER — PREDNISONE 20 MG/1
20 TABLET ORAL DAILY
Status: DISCONTINUED | OUTPATIENT
Start: 2023-06-21 | End: 2023-06-22 | Stop reason: HOSPADM

## 2023-06-21 RX ORDER — FUROSEMIDE 10 MG/ML
40 INJECTION INTRAMUSCULAR; INTRAVENOUS ONCE
Status: DISCONTINUED | OUTPATIENT
Start: 2023-06-21 | End: 2023-06-22 | Stop reason: HOSPADM

## 2023-06-21 RX ORDER — IPRATROPIUM BROMIDE AND ALBUTEROL SULFATE 2.5; .5 MG/3ML; MG/3ML
1 SOLUTION RESPIRATORY (INHALATION) EVERY 6 HOURS PRN
Status: DISCONTINUED | OUTPATIENT
Start: 2023-06-21 | End: 2023-06-22 | Stop reason: HOSPADM

## 2023-06-21 RX ORDER — FUROSEMIDE 40 MG/1
40 TABLET ORAL ONCE
Status: COMPLETED | OUTPATIENT
Start: 2023-06-21 | End: 2023-06-21

## 2023-06-21 RX ADMIN — FAMOTIDINE 40 MG: 20 TABLET ORAL at 10:30

## 2023-06-21 RX ADMIN — APIXABAN 5 MG: 5 TABLET, FILM COATED ORAL at 20:33

## 2023-06-21 RX ADMIN — FUROSEMIDE 40 MG: 40 TABLET ORAL at 17:30

## 2023-06-21 RX ADMIN — INSULIN LISPRO 4 UNITS: 100 INJECTION, SOLUTION INTRAVENOUS; SUBCUTANEOUS at 13:34

## 2023-06-21 RX ADMIN — APIXABAN 5 MG: 5 TABLET, FILM COATED ORAL at 10:31

## 2023-06-21 RX ADMIN — BUDESONIDE AND FORMOTEROL FUMARATE DIHYDRATE 2 PUFF: 80; 4.5 AEROSOL RESPIRATORY (INHALATION) at 20:18

## 2023-06-21 RX ADMIN — FAMOTIDINE 40 MG: 20 TABLET ORAL at 20:33

## 2023-06-21 RX ADMIN — SODIUM CHLORIDE, PRESERVATIVE FREE 10 ML: 5 INJECTION INTRAVENOUS at 10:31

## 2023-06-21 RX ADMIN — METHYLPREDNISOLONE SODIUM SUCCINATE 40 MG: 40 INJECTION, POWDER, FOR SOLUTION INTRAMUSCULAR; INTRAVENOUS at 03:16

## 2023-06-21 RX ADMIN — ACETAMINOPHEN 650 MG: 325 TABLET ORAL at 10:29

## 2023-06-21 RX ADMIN — ATORVASTATIN CALCIUM 40 MG: 40 TABLET, FILM COATED ORAL at 20:33

## 2023-06-21 RX ADMIN — FUROSEMIDE 40 MG: 40 TABLET ORAL at 10:31

## 2023-06-21 RX ADMIN — BUDESONIDE AND FORMOTEROL FUMARATE DIHYDRATE 2 PUFF: 80; 4.5 AEROSOL RESPIRATORY (INHALATION) at 09:27

## 2023-06-21 RX ADMIN — PREDNISONE 20 MG: 20 TABLET ORAL at 17:30

## 2023-06-21 RX ADMIN — LEVOFLOXACIN 750 MG: 500 TABLET, FILM COATED ORAL at 21:22

## 2023-06-21 RX ADMIN — METHYLPREDNISOLONE SODIUM SUCCINATE 40 MG: 40 INJECTION, POWDER, FOR SOLUTION INTRAMUSCULAR; INTRAVENOUS at 10:30

## 2023-06-21 ASSESSMENT — PAIN DESCRIPTION - LOCATION
LOCATION: HEAD

## 2023-06-21 ASSESSMENT — PAIN SCALES - GENERAL
PAINLEVEL_OUTOF10: 0
PAINLEVEL_OUTOF10: 1
PAINLEVEL_OUTOF10: 1
PAINLEVEL_OUTOF10: 3
PAINLEVEL_OUTOF10: 1

## 2023-06-21 ASSESSMENT — PAIN DESCRIPTION - DESCRIPTORS
DESCRIPTORS: ACHING

## 2023-06-21 ASSESSMENT — PAIN - FUNCTIONAL ASSESSMENT: PAIN_FUNCTIONAL_ASSESSMENT: ACTIVITIES ARE NOT PREVENTED

## 2023-06-21 NOTE — CARE COORDINATION
06/20/23 1155   Service Assessment   Patient Orientation Alert and Oriented   Cognition Alert   History Provided By Patient   Primary Caregiver Self   Support Systems Family Members   PCP Verified by CM Yes   Last Visit to PCP Within last 3 months   Prior Functional Level Assistance with the following:;Bathing;Dressing   Current Functional Level Bathing;Dressing;Assistance with the following:   Can patient return to prior living arrangement Yes   Ability to make needs known: Good   Family able to assist with home care needs: Yes   Would you like for me to discuss the discharge plan with any other family members/significant others, and if so, who? Yes   Financial Resources Medicare   Community Resources None   Social/Functional History   Lives With Family   Type of [address] One level   [facility] to enter with rails   Entrance Stairs - Number of Steps [address] [facility] [address] Independent   Transfer Assistance Independent   Active  Yes   Mode of Transportation Car   Discharge Planning   Type of Residence House   Current Services Prior To Admission Oxygen Therapy   Potential Assistance Needed N/A   DME Ordered? No   Potential Assistance Purchasing Medications No   History of falls? 0   Services At/After Discharge   Transition of Care Consult (CM Consult) Home Health  (Current with [facility])   Internal Home Health No   Services At/After Discharge None    Resource Information Provided? No   Mode of Transport at Discharge Other (see comment)   Confirm Follow Up Transport Family   Condition of Participation: Discharge Planning   Freedom of Choice list was provided with basic dialogue that supports the patient's individualized plan of care/goals, treatment preferences, and shares the quality data associated with the providers? No     CM discussed discharge planning with patient at bedside.

## 2023-06-21 NOTE — CARE COORDINATION
Patient will discharge home with [facility] services when medically stable. Patient daughter will  transport at discharge.

## 2023-06-22 VITALS
OXYGEN SATURATION: 98 % | RESPIRATION RATE: 16 BRPM | TEMPERATURE: 98 F | SYSTOLIC BLOOD PRESSURE: 139 MMHG | WEIGHT: 201.28 LBS | HEART RATE: 85 BPM | BODY MASS INDEX: 34.36 KG/M2 | HEIGHT: 64 IN | DIASTOLIC BLOOD PRESSURE: 70 MMHG

## 2023-06-22 PROBLEM — R06.02 SHORTNESS OF BREATH: Status: RESOLVED | Noted: 2023-06-19 | Resolved: 2023-06-22

## 2023-06-22 LAB
ECHO AO ASC DIAM: 3.2 CM
ECHO AO ASCENDING AORTA INDEX: 1.63 CM/M2
ECHO AO ROOT DIAM: 2.7 CM
ECHO AO ROOT INDEX: 1.38 CM/M2
ECHO AV AREA PEAK VELOCITY: 2.1 CM2
ECHO AV AREA VTI: 2.3 CM2
ECHO AV AREA/BSA PEAK VELOCITY: 1.1 CM2/M2
ECHO AV AREA/BSA VTI: 1.2 CM2/M2
ECHO AV CUSP MM: 2 CM
ECHO AV MEAN GRADIENT: 6 MMHG
ECHO AV MEAN VELOCITY: 1.2 M/S
ECHO AV PEAK GRADIENT: 12 MMHG
ECHO AV PEAK VELOCITY: 1.8 M/S
ECHO AV VELOCITY RATIO: 0.61
ECHO AV VTI: 36.3 CM
ECHO BSA: 2.03 M2
ECHO LA AREA 2C: 26.9 CM2
ECHO LA AREA 4C: 23.1 CM2
ECHO LA DIAMETER INDEX: 2.04 CM/M2
ECHO LA DIAMETER: 4 CM
ECHO LA MAJOR AXIS: 6.2 CM
ECHO LA MINOR AXIS: 7.3 CM
ECHO LA TO AORTIC ROOT RATIO: 1.48
ECHO LA VOL 2C: 81 ML (ref 22–52)
ECHO LA VOL 4C: 71 ML (ref 22–52)
ECHO LA VOL BP: 80 ML (ref 22–52)
ECHO LA VOL/BSA BIPLANE: 41 ML/M2 (ref 16–34)
ECHO LA VOLUME INDEX A2C: 41 ML/M2 (ref 16–34)
ECHO LA VOLUME INDEX A4C: 36 ML/M2 (ref 16–34)
ECHO LV E' LATERAL VELOCITY: 10 CM/S
ECHO LV E' SEPTAL VELOCITY: 8 CM/S
ECHO LV EDV A2C: 62 ML
ECHO LV EDV A4C: 83 ML
ECHO LV EDV INDEX A4C: 42 ML/M2
ECHO LV EDV NDEX A2C: 32 ML/M2
ECHO LV EJECTION FRACTION A2C: 73 %
ECHO LV EJECTION FRACTION A4C: 67 %
ECHO LV EJECTION FRACTION BIPLANE: 69 % (ref 55–100)
ECHO LV ESV A2C: 17 ML
ECHO LV ESV A4C: 28 ML
ECHO LV ESV INDEX A2C: 9 ML/M2
ECHO LV ESV INDEX A4C: 14 ML/M2
ECHO LV FRACTIONAL SHORTENING: 35 % (ref 28–44)
ECHO LV INTERNAL DIMENSION DIASTOLE INDEX: 2.19 CM/M2
ECHO LV INTERNAL DIMENSION DIASTOLIC MMODE: 4.8 CM (ref 3.9–5.3)
ECHO LV INTERNAL DIMENSION DIASTOLIC: 4.3 CM (ref 3.9–5.3)
ECHO LV INTERNAL DIMENSION SYSTOLIC INDEX: 1.43 CM/M2
ECHO LV INTERNAL DIMENSION SYSTOLIC MMODE: 2.9 CM
ECHO LV INTERNAL DIMENSION SYSTOLIC: 2.8 CM
ECHO LV IVSD: 1 CM (ref 0.6–0.9)
ECHO LV MASS 2D: 162.9 G (ref 67–162)
ECHO LV MASS INDEX 2D: 83.1 G/M2 (ref 43–95)
ECHO LV POSTERIOR WALL DIASTOLIC MMODE: 0.9 CM (ref 0.6–0.9)
ECHO LV POSTERIOR WALL DIASTOLIC: 1.2 CM (ref 0.6–0.9)
ECHO LV RELATIVE WALL THICKNESS RATIO: 0.56
ECHO LVOT AREA: 3.1 CM2
ECHO LVOT AV VTI INDEX: 0.72
ECHO LVOT DIAM: 2 CM
ECHO LVOT MEAN GRADIENT: 3 MMHG
ECHO LVOT PEAK GRADIENT: 5 MMHG
ECHO LVOT PEAK VELOCITY: 1.1 M/S
ECHO LVOT STROKE VOLUME INDEX: 42.1 ML/M2
ECHO LVOT SV: 82.6 ML
ECHO LVOT VTI: 26.3 CM
ECHO MV A VELOCITY: 0.99 M/S
ECHO MV E DECELERATION TIME (DT): 214 MS
ECHO MV E VELOCITY: 0.8 M/S
ECHO MV E/A RATIO: 0.81
ECHO MV E/E' LATERAL: 8
ECHO MV E/E' RATIO (AVERAGED): 9
ECHO MV E/E' SEPTAL: 10
ECHO PV MAX VELOCITY: 1.4 M/S
ECHO PV PEAK GRADIENT: 7 MMHG
ECHO RA AREA 4C: 15.2 CM2
ECHO RA END SYSTOLIC VOLUME APICAL 4 CHAMBER INDEX BSA: 18 ML/M2
ECHO RA VOLUME: 35 ML
ECHO RV BASAL DIMENSION: 4.1 CM
ECHO RV LONGITUDINAL DIMENSION: 8.5 CM
ECHO RV MID DIMENSION: 3.2 CM
ECHO RV TAPSE: 3.1 CM (ref 1.7–?)
ECHO RVOT MEAN GRADIENT: 2 MMHG
ECHO RVOT PEAK GRADIENT: 4 MMHG
ECHO RVOT PEAK VELOCITY: 0.9 M/S
ECHO RVOT VTI: 19.3 CM
ECHO TV REGURGITANT MAX VELOCITY: 2.84 M/S
ECHO TV REGURGITANT PEAK GRADIENT: 32 MMHG
GLUCOSE BLD STRIP.AUTO-MCNC: 180 MG/DL (ref 65–100)
GLUCOSE BLD STRIP.AUTO-MCNC: 235 MG/DL (ref 65–100)
PERFORMED BY:: ABNORMAL
PERFORMED BY:: ABNORMAL

## 2023-06-22 PROCEDURE — 2700000000 HC OXYGEN THERAPY PER DAY

## 2023-06-22 PROCEDURE — 82962 GLUCOSE BLOOD TEST: CPT

## 2023-06-22 PROCEDURE — 94761 N-INVAS EAR/PLS OXIMETRY MLT: CPT

## 2023-06-22 PROCEDURE — 94640 AIRWAY INHALATION TREATMENT: CPT

## 2023-06-22 PROCEDURE — 6370000000 HC RX 637 (ALT 250 FOR IP): Performed by: FAMILY MEDICINE

## 2023-06-22 RX ORDER — LEVOFLOXACIN 750 MG/1
750 TABLET ORAL DAILY
Qty: 5 TABLET | Refills: 0 | Status: SHIPPED | OUTPATIENT
Start: 2023-06-23 | End: 2023-06-28

## 2023-06-22 RX ORDER — IPRATROPIUM BROMIDE AND ALBUTEROL SULFATE 2.5; .5 MG/3ML; MG/3ML
3 SOLUTION RESPIRATORY (INHALATION) EVERY 6 HOURS PRN
Qty: 360 ML | Refills: 1 | Status: SHIPPED | OUTPATIENT
Start: 2023-06-22

## 2023-06-22 RX ORDER — HYDRALAZINE HYDROCHLORIDE 25 MG/1
25 TABLET, FILM COATED ORAL EVERY 8 HOURS SCHEDULED
Status: DISCONTINUED | OUTPATIENT
Start: 2023-06-22 | End: 2023-06-22

## 2023-06-22 RX ORDER — PREDNISONE 20 MG/1
20 TABLET ORAL DAILY
Qty: 10 TABLET | Refills: 0 | Status: SHIPPED | OUTPATIENT
Start: 2023-06-23 | End: 2023-07-03

## 2023-06-22 RX ORDER — HYDRALAZINE HYDROCHLORIDE 25 MG/1
25 TABLET, FILM COATED ORAL ONCE
Status: COMPLETED | OUTPATIENT
Start: 2023-06-22 | End: 2023-06-22

## 2023-06-22 RX ORDER — BUDESONIDE AND FORMOTEROL FUMARATE DIHYDRATE 80; 4.5 UG/1; UG/1
2 AEROSOL RESPIRATORY (INHALATION) 2 TIMES DAILY
Qty: 10.2 G | Refills: 3 | Status: SHIPPED | OUTPATIENT
Start: 2023-06-22

## 2023-06-22 RX ADMIN — HYDRALAZINE HYDROCHLORIDE 25 MG: 25 TABLET, FILM COATED ORAL at 15:24

## 2023-06-22 RX ADMIN — DILTIAZEM HYDROCHLORIDE 120 MG: 60 CAPSULE, EXTENDED RELEASE ORAL at 10:40

## 2023-06-22 RX ADMIN — FAMOTIDINE 40 MG: 20 TABLET ORAL at 10:40

## 2023-06-22 RX ADMIN — BUDESONIDE AND FORMOTEROL FUMARATE DIHYDRATE 2 PUFF: 80; 4.5 AEROSOL RESPIRATORY (INHALATION) at 09:33

## 2023-06-22 RX ADMIN — APIXABAN 5 MG: 5 TABLET, FILM COATED ORAL at 10:41

## 2023-06-22 RX ADMIN — LEVOFLOXACIN 750 MG: 500 TABLET, FILM COATED ORAL at 10:40

## 2023-06-22 RX ADMIN — LISINOPRIL 40 MG: 40 TABLET ORAL at 10:41

## 2023-06-22 RX ADMIN — PREDNISONE 20 MG: 20 TABLET ORAL at 10:41

## 2023-06-22 RX ADMIN — FUROSEMIDE 40 MG: 40 TABLET ORAL at 10:41

## 2023-06-22 NOTE — CARE COORDINATION
Patient will discharge home today with [facility] services. Anticipated SOC date 06-. Primary nurse is aware. Daughter will  at 3pm for transport. Transition of Care Plan:    RUR: 13%  Prior Level of Functioning: HH Services  Disposition: HH services  If SNF or IPR: Date FOC offered: N/A  Date FOC received:   Accepting facility:   Date authorization started with reference number:   Date authorization received and expires: Follow up appointments: Set up by  as needed  DME needed: None  Transportation at discharge: Family  IM/IMM Medicare/ letter given: Yes  Is patient a  and connected with VA? No If yes, was [employer] transfer form completed and VA notified? Caregiver Contact: Pepe Pereira  Discharge Caregiver contacted prior to discharge? Yes  Care Conference needed?  No  Barriers to discharge: None

## 2023-06-22 NOTE — CONSULTS
Cardiology Consult    NAME: Lola Manning   :  1950   MRN:  [id number]     Date/Time:  2023 9:45 AM    Patient PCP: Morro Shahid MD  ________________________________________________________________________     Assessment:   Paroxysmal atrial fibrillation  Acute respiratory failure on IV antibiotics and oxygen  Essential hypertension  Hyperlipidemia  Chronic hypoxemia on 2 L oxygen continuously  Recent pneumonia      Plan:   Paroxysmal atrial fibrillation is a chronic diagnosis for which the patient is on diltiazem and Eliquis at home, no need for additional work-up    Reviewed echocardiogram-->normal EF    Outpatient evaluation with her primary cardiologist, Dr. Brandie Lyons      []        High complexity decision making was performed        Subjective:   CHIEF COMPLAINT: SOB      REASON FOR CONSULT: Atrial Fibrillation      HISTORY OF PRESENT ILLNESS:     Lola Manning is a 75 y.o. Black / [place] American female who has a history of paroxysmal atrial fibrillation on Eliquis and followed in the office by Dr. Brandie Lyons. She has hyperlipidemia on statin therapy, obesity, and chronic hypertension. She also suffers from diabetes, dysphagia, GERD, and was admitted on 2023 due to shortness of breath and hypotension. Yesterday, she had an episode of paroxysmal atrial fibrillation. She is now back in sinus rhythm. No associated chest pain, no dizziness, patient says she gets these episodes frequently at home. She has a scheduled appointment to see her cardiologist today but will need to move the appointment.         Past Medical History:   Diagnosis Date    Diabetes ([address]     High cholesterol     Hypertension       Past Surgical History:   Procedure Laterality Date    BREAST BIOPSY Left 2023    LINDSAY 3D JOAQUIN STEREO VAC BX BREAST LT 1ST LES W/CLIP SPEC 2023 [facility] RAD [facility]    HEMORRHOID SURGERY      LINDSAY STEROTACTIC LOC BREAST BIOPSY LEFT Left 2023    LINDSAY STEROTACTIC LOC BREAST

## 2023-06-22 NOTE — PLAN OF CARE
Problem: Discharge Planning  Goal: Discharge to home or other facility with appropriate resources  6/20/2023 1950 by Oleg Zavala RN  Outcome: Progressing  Flowsheets (Taken 6/20/2023 1947)  Discharge to home or other facility with appropriate resources: Identify barriers to discharge with patient and caregiver  6/20/2023 1504 by Sandrita Dunne LPN  Outcome: Progressing  Flowsheets (Taken 6/20/2023 4242)  Discharge to home or other facility with appropriate resources:   Identify barriers to discharge with patient and caregiver   Arrange for needed discharge resources and transportation as appropriate   Identify discharge learning needs (meds, wound care, etc)     Problem: Safety - Adult  Goal: Free from fall injury  6/20/2023 1950 by Oleg Zavala RN  Outcome: Progressing  6/20/2023 1504 by Sandrita Dunne LPN  Outcome: Progressing  Flowsheets (Taken 6/20/2023 0839)  Free From Fall Injury: Instruct family/caregiver on patient safety     Problem: Chronic Conditions and Co-morbidities  Goal: Patient's chronic conditions and co-morbidity symptoms are monitored and maintained or improved  6/20/2023 1950 by Oleg Zavala RN  Outcome: Progressing  6/20/2023 1504 by Sandrita Dunne LPN  Outcome: Progressing  Flowsheets (Taken 6/20/2023 7297)  Care Plan - Patient's Chronic Conditions and Co-Morbidity Symptoms are Monitored and Maintained or Improved:   Monitor and assess patient's chronic conditions and comorbid symptoms for stability, deterioration, or improvement   Collaborate with multidisciplinary team to address chronic and comorbid conditions and prevent exacerbation or deterioration   Update acute care plan with appropriate goals if chronic or comorbid symptoms are exacerbated and prevent overall improvement and discharge     Problem: Physical Therapy - Adult  Goal: By Discharge: Performs mobility at highest level of function for planned discharge setting.   See evaluation for
Problem: Discharge Planning  Goal: Discharge to home or other facility with appropriate resources  6/22/2023 1545 by Lauren Olmos LPN  Outcome: Completed  6/22/2023 1530 by Lauren Olmos LPN  Outcome: Progressing     Problem: Safety - Adult  Goal: Free from fall injury  6/22/2023 1545 by Lauren Olmos LPN  Outcome: Completed  6/22/2023 1530 by Lauren Olmos LPN  Outcome: Progressing     Problem: Chronic Conditions and Co-morbidities  Goal: Patient's chronic conditions and co-morbidity symptoms are monitored and maintained or improved  6/22/2023 1545 by Lauren Olmos LPN  Outcome: Completed  6/22/2023 1530 by Lauren Olmos LPN  Outcome: Progressing     Problem: Pain  Goal: Verbalizes/displays adequate comfort level or baseline comfort level  6/22/2023 1545 by Lauren Olmos LPN  Outcome: Completed  6/22/2023 1530 by Lauren Olmos LPN  Outcome: Progressing     Problem: Skin/Tissue Integrity  Goal: Absence of new skin breakdown  Description: 1. Monitor for areas of redness and/or skin breakdown  2. Assess vascular access sites hourly  3. Every 4-6 hours minimum:  Change oxygen saturation probe site  4. Every 4-6 hours:  If on nasal continuous positive airway pressure, respiratory therapy assess nares and determine need for appliance change or resting period.   6/22/2023 1545 by Lauren Olmos LPN  Outcome: Completed  6/22/2023 1530 by Lauren Olmos LPN  Outcome: Progressing
Problem: Discharge Planning  Goal: Discharge to home or other facility with appropriate resources  Outcome: Progressing     Problem: Safety - Adult  Goal: Free from fall injury  Outcome: Progressing
Problem: Discharge Planning  Goal: Discharge to home or other facility with appropriate resources  Outcome: Progressing     Problem: Safety - Adult  Goal: Free from fall injury  Outcome: Progressing     Problem: Chronic Conditions and Co-morbidities  Goal: Patient's chronic conditions and co-morbidity symptoms are monitored and maintained or improved  Outcome: Progressing     Problem: Pain  Goal: Verbalizes/displays adequate comfort level or baseline comfort level  Outcome: Progressing     Problem: Skin/Tissue Integrity  Goal: Absence of new skin breakdown  Description: 1. Monitor for areas of redness and/or skin breakdown  2. Assess vascular access sites hourly  3. Every 4-6 hours minimum:  Change oxygen saturation probe site  4. Every 4-6 hours:  If on nasal continuous positive airway pressure, respiratory therapy assess nares and determine need for appliance change or resting period.   Outcome: Progressing
Problem: Discharge Planning  Goal: Discharge to home or other facility with appropriate resources  Outcome: Progressing  Flowsheets (Taken 6/20/2023 5690)  Discharge to home or other facility with appropriate resources:   Identify barriers to discharge with patient and caregiver   Arrange for needed discharge resources and transportation as appropriate   Identify discharge learning needs (meds, wound care, etc)     Problem: Safety - Adult  Goal: Free from fall injury  Outcome: Progressing  Flowsheets (Taken 6/20/2023 0839)  Free From Fall Injury: Instruct family/caregiver on patient safety     Problem: Chronic Conditions and Co-morbidities  Goal: Patient's chronic conditions and co-morbidity symptoms are monitored and maintained or improved  Outcome: Progressing  Flowsheets (Taken 6/20/2023 0839)  Care Plan - Patient's Chronic Conditions and Co-Morbidity Symptoms are Monitored and Maintained or Improved:   Monitor and assess patient's chronic conditions and comorbid symptoms for stability, deterioration, or improvement   Collaborate with multidisciplinary team to address chronic and comorbid conditions and prevent exacerbation or deterioration   Update acute care plan with appropriate goals if chronic or comorbid symptoms are exacerbated and prevent overall improvement and discharge     Problem: Physical Therapy - Adult  Goal: By Discharge: Performs mobility at highest level of function for planned discharge setting. See evaluation for individualized goals. Description: FUNCTIONAL STATUS PRIOR TO ADMISSION: Patient was modified independent using a single point cane and walker for functional mobility. HOME SUPPORT PRIOR TO ADMISSION: The patient lived with dtr but did not require assistance.     Physical Therapy Goals  Initiated 6/20/2023  Pt stated goal: to go home    I with LE HEP x7 days  Mod I with bed mob x7 days  SBA with all transfers x7 days  Amb 25-75ft with RW and SBAx1 x7 days        6/20/2023 9829
Problem: Discharge Planning  Goal: Discharge to home or other facility with appropriate resources  Outcome: Progressing  Flowsheets (Taken 6/21/2023 1029 by Mark Alcantar LPN)  Discharge to home or other facility with appropriate resources:   Identify barriers to discharge with patient and caregiver   Arrange for needed discharge resources and transportation as appropriate   Identify discharge learning needs (meds, wound care, etc)     Problem: Safety - Adult  Goal: Free from fall injury  Outcome: Progressing  Flowsheets (Taken 6/21/2023 1029 by Mark Alcantar LPN)  Free From Fall Injury: Instruct family/caregiver on patient safety     Problem: Chronic Conditions and Co-morbidities  Goal: Patient's chronic conditions and co-morbidity symptoms are monitored and maintained or improved  Outcome: Progressing  Flowsheets (Taken 6/21/2023 1029 by Mark Alcantar LPN)  Care Plan - Patient's Chronic Conditions and Co-Morbidity Symptoms are Monitored and Maintained or Improved:   Monitor and assess patient's chronic conditions and comorbid symptoms for stability, deterioration, or improvement   Collaborate with multidisciplinary team to address chronic and comorbid conditions and prevent exacerbation or deterioration   Update acute care plan with appropriate goals if chronic or comorbid symptoms are exacerbated and prevent overall improvement and discharge     Problem: Physical Therapy - Adult  Goal: By Discharge: Performs mobility at highest level of function for planned discharge setting. See evaluation for individualized goals. Description: FUNCTIONAL STATUS PRIOR TO ADMISSION: Patient was modified independent using a single point cane and walker for functional mobility. HOME SUPPORT PRIOR TO ADMISSION: The patient lived with dtr but did not require assistance.     Physical Therapy Goals  Initiated 6/20/2023  Pt stated goal: to go home    I with LE HEP x7 days  Mod I with bed mob x7 days  SBA with all
Problem: Physical Therapy - Adult  Goal: By Discharge: Performs mobility at highest level of function for planned discharge setting. See evaluation for individualized goals. Description: FUNCTIONAL STATUS PRIOR TO ADMISSION: Patient was modified independent using a single point cane and walker for functional mobility. HOME SUPPORT PRIOR TO ADMISSION: The patient lived with dtr but did not require assistance. Physical Therapy Goals  Initiated 6/20/2023  Pt stated goal: to go home    I with LE HEP x7 days  Mod I with bed mob x7 days  SBA with all transfers x7 days  Amb 25-75ft with RW and SBAx1 x7 days        Outcome: Progressing            PHYSICAL THERAPY TREATMENT     Patient: Doug Solis (89 y.o. female)  Date: 6/21/2023  Diagnosis: Shortness of breath [R06.02]  ELAM (dyspnea on exertion) [R06.09]  Hypoxia [R09.02] Shortness of breath      Precautions: In place during session: Nasal Cannula 2L and EKG/telemetry   Chart, physical therapy assessment, plan of care and goals were reviewed. ASSESSMENT  Patient continues with skilled PT services and is progressing towards goals. Pt semi supine upon PTA arrival, agreeable to session. Pt A&O x 4. (See below for objective details and assist levels). Overall pt tolerated session fair today with bed mobility, transfers, seated therex and ambulation. Pt demo's steady progression towards all functional goals requiring less A to date. Pt able to tolerate increase in ambulation distances and seated therex. Pt initially demo's step to gait pattern progressed to step thru with no overt LOB or knee buckling noted with activity. VC for turning with RW and maintaining on floor. Pt demo's increased WOB with exertion however SA02 >96% throughout. Pt educated on energy conservation and pursed lip breathing. Will continue to benefit from skilled PT services, and will continue to progress as tolerated.      Current Level of Function Impacting
functional  Grossly 4-/5 B LE    Range Of Motion:  AROM: Generally decreased, functional   B LE      Tone & Sensation:      Sensation: Intact  Intact to LT B LE       Functional Mobility:  Bed Mobility:     Bed Mobility Training  Bed Mobility Training: Yes  Overall Level of Assistance: Contact-guard assistance  Rolling: Contact-guard assistance  Supine to Sit: Contact-guard assistance  Sit to Supine: Contact-guard assistance  Scooting: Contact-guard assistance    Transfers:     Transfer Training  Transfer Training: Yes  Overall Level of Assistance: Contact-guard assistance  Sit to Stand: Contact-guard assistance  Stand Pivot Transfers: Contact-guard assistance    Balance:     Balance  Sitting: Intact  Standing: Impaired  Standing - Static: Constant support; Fair  Standing - Dynamic: Constant support; Fair    Ambulation/Gait Training:    Gait  Overall Level of Assistance: Contact-guard assistance  Speed/[place]: Slow  Distance (ft): 12 Feet  Assistive Device: Walker, rolling         Functional Measure:  [facility] Mobility Inpatient Short Form  How much difficulty does the patient currently have. .. Unable A Lot A Little None   1. Turning over in bed (including adjusting bedclothes, sheets and blankets)? [] 1   [] 2   [x] 3   [] 4   2. Sitting down on and standing up from a chair with arms ( e.g., wheelchair, bedside commode, etc.)   [] 1   [] 2   [x] 3   [] 4   3. Moving from lying on back to sitting on the side of the bed? [] 1   [] 2   [x] 3   [] 4          How much help from another person does the patient currently need. .. Total A Lot A Little None   4. Moving to and from a bed to a chair (including a wheelchair)? [] 1   [] 2   [x] 3   [] 4   5. Need to walk in hospital room? [] 1   [] 2   [x] 3   [] 4   6. Climbing 3-5 steps with a railing? [] 1   [] 2   [x] 3   [] 4   © 2007, Trustees of [address] [place] [address] [address] [id number], under license to [employer].  All rights reserved

## 2023-06-22 NOTE — PROGRESS NOTES
1334:  Patient's IV was burning upon flush and was removed. Writer attempted to place and IV x 2 without success. Patient refuses to have another IV placed or attempted. 1520:  Primary MD notified of patient refusing to have IV access placed. New orders received for Prednisone 20mg PO daily and Lasix 40mg PO once.
2302- patient is complaining that she is still having shortness of breath, presently on 2L by nasal cannula, saturating well at 100%. Called attending physician got an order for duonebs every 4 hours PRN. Informed RT on duty, RT assess the patient and she said she's already okay, RT will re-assess the patient again later.
Cardiologist notified of consult.
General Daily Progress Note      Patient Name:   Edmund Galeano       YOB: 1950       Age:  75 y.o. Admit Date: 6/19/2023      Subjective:     CHIEF COMPLAINT:      Shortness of breath           HISTORY OF PRESENT ILLNESS:        Patient is a 75y.o. year old female history of diabetes hypertension hyperlipidemia chronic A-fib history of COVID in 2021 came to emergency room because of shortness of breath advised the family since patient have a COVID patient have issue of shortness of breath off-and-on patient was recently discharged from the hospital with pneumonia, came today with chest pain shortness of breath blood pressure was low seen by the ER physician and recommended patient to be admitted for further evaluation and treatment  Blood work was showing BNP was 285 troponin was negative  CT T of the chest negative     6/20  Patient sitting comfortably in bed. Not currently wearing oxygen. Denies SOB, chest pain, fevers, N/V. Pt states the SOB comes and goes since she had COVID in 2021 and was recently discharged with pneumonia. 6/21  Patient sitting in bed wearing 2L oxygen via NC. Admits headache and shortness of breath with exertion, denies chest pain, fevers, N/V. Neutropenia resolved from yesterday. Objective:     Vitals:    06/21/23 0752   BP: (!) 141/72   Pulse: 72   Resp: 20   Temp: 98 °F (36.7 °C)   SpO2: 100%        Recent Results (from the past 24 hour(s))   POCT Glucose    Collection Time: 06/20/23 11:30 AM   Result Value Ref Range    POC Glucose 207 (H) 65 - 100 mg/dL    Performed by:  Sonido Frausto (Float Pool)    POCT Glucose    Collection Time: 06/20/23  4:34 PM   Result Value Ref Range    POC Glucose 229 (H) 65 - 100 mg/dL    Performed by: Ashley Rodriguez    POCT Glucose    Collection Time: 06/20/23  7:47 PM   Result Value Ref Range    POC Glucose 279 (H) 65 - 100 mg/dL    Performed by: Yang Cordoba    POCT Glucose    Collection Time: 06/20/23 11:49 PM
General Daily Progress Note      Patient Name:   Shellie Arias       YOB: 1950       Age:  75 y.o. Admit Date: 6/19/2023      Subjective:     CHIEF COMPLAINT:      Shortness of breath           HISTORY OF PRESENT ILLNESS:        Patient is a 75y.o. year old female history of diabetes hypertension hyperlipidemia chronic A-fib history of COVID in 2021 came to emergency room because of shortness of breath advised the family since patient have a COVID patient have issue of shortness of breath off-and-on patient was recently discharged from the hospital with pneumonia, came today with chest pain shortness of breath blood pressure was low seen by the ER physician and recommended patient to be admitted for further evaluation and treatment  Blood work was showing BNP was 285 troponin was negative  CT T of the chest negative     6/20  Patient sitting comfortably in bed. Not currently wearing oxygen. Denies SOB, chest pain, fevers, N/V. Pt states the SOB comes and goes since she had COVID in 2021 and was recently discharged with pneumonia. 6/21  Patient sitting in bed wearing 2L oxygen via NC. Admits headache and shortness of breath with exertion, denies chest pain, fevers, N/V. Neutropenia resolved from yesterday. 6/22  Pt sitting in bed wearing 3L oxygen via NC. Denies headache, SOB, chest pain. Echocardiogram from yesterday pending, cardiology will see pt today.         Objective:     Vitals:    06/22/23 0734   BP: (!) 140/69   Pulse: 55   Resp: 19   Temp: 97.9 °F (36.6 °C)   SpO2: 100%        Recent Results (from the past 24 hour(s))   POCT Glucose    Collection Time: 06/21/23 10:52 AM   Result Value Ref Range    POC Glucose 244 (H) 65 - 100 mg/dL    Performed by: Valeriano Leonard    Transthoracic echocardiogram (TTE) complete with contrast, bubble, strain, and 3D PRN    Collection Time: 06/21/23  3:36 PM   Result Value Ref Range    LV EDV A2C 62 mL    LV EDV A4C 83 mL    LV ESV A2C 17 mL    LV
General Daily Progress Note      Patient Name:   Sherren Sleigh       YOB: 1950       Age:  75 y.o. Admit Date: 6/19/2023      Subjective:     CHIEF COMPLAINT:      Shortness of breath           HISTORY OF PRESENT ILLNESS:        Patient is a 75y.o. year old female history of diabetes hypertension hyperlipidemia chronic A-fib history of COVID in 2021 came to emergency room because of shortness of breath advised the family since patient have a COVID patient have issue of shortness of breath off-and-on patient was recently discharged from the hospital with pneumonia, came today with chest pain shortness of breath blood pressure was low seen by the ER physician and recommended patient to be admitted for further evaluation and treatment  Blood work was showing BNP was 285 troponin was negative  CT T of the chest negative     6/20  Patient sitting comfortably in bed. Not currently wearing oxygen. Denies SOB, chest pain, fevers, N/V. Pt states the SOB comes and goes since she had COVID in 2021 and was recently discharged with pneumonia.              Objective:     Vitals:    06/20/23 0901   BP:    Pulse: 85   Resp: 18   Temp:    SpO2: 98%        Recent Results (from the past 24 hour(s))   EKG 12 Lead    Collection Time: 06/19/23  1:06 PM   Result Value Ref Range    Ventricular Rate 60 BPM    Atrial Rate 60 BPM    P-R Interval 174 ms    QRS Duration 116 ms    Q-T Interval 426 ms    QTc Calculation (Bazett) 426 ms    P Axis 45 degrees    R Axis -50 degrees    T Axis 52 degrees    Diagnosis       Normal sinus rhythm  Left anterior fascicular block  Left ventricular hypertrophy with QRS widening and repolarization abnormality  Possible Lateral infarct (cited on or before 05-OCT-2021)  Abnormal ECG  When compared with ECG of 09-JAN-2023 20:08,  MA interval has decreased  T wave inversion now evident in Inferior leads  T wave inversion no longer evident in Lateral leads  Confirmed by Vielka Escobar
Nurse aware of patient BP orders in place nurse will follow
PULMONARY NOTE  [facility]    Name: Nina Marie MRN: [id number]   : 1950 [facility]   Date: 2023  Admission date: 2023 Hospital Day: 4       HPI:     Patient Active Problem List   Diagnosis    SOB (shortness of breath)    Dyspnea    Partial small bowel obstruction (HCC)    SBO (small bowel obstruction) (HCC)    Shortness of breath             [x] High complexity decision making was performed  [x] See my orders for details      Subjective/Initial History:     I was asked by Jolie Rayo MD to see Nina Marie  a 75 y.o.    female in consultation     Excerpts from admission 2023 or consult notes as follows:   70-year lady came in because of shortness of breath dyspnea and chest pain she has past medical history of chronic A-fib hypertension hyperlipidemia diabetes mellitus personal history of COVID-2021 she came in because shortness of breath and dyspnea, she is a lifetime non-smoker so admitted and pulm consult was called, CAT scan of the chest was done was negative for embolism but shows chronic scarring most likely secondary to COVID       Allergies   Allergen Reactions    Doxycycline      Other reaction(s): Unknown (comments)  HIVES    Metoprolol      Other reaction(s): Unknown (comments)  HIVES        MAR reviewed and pertinent medications noted or modified as needed     Current Facility-Administered Medications   Medication Dose Route Frequency Provider Last Rate Last Admin    ipratropium 0.5 mg-albuterol 2.5 mg (DUONEB) nebulizer solution 1 Dose  1 Dose Inhalation Q6H PRN Soco Núñez MD        furosemide (LASIX) injection 40 mg  40 mg IntraVENous Once Jolie Rayo MD        predniSONE (DELTASONE) tablet 20 mg  20 mg Oral Daily Chichi Núñez MD   20 mg at 23 1041    levoFLOXacin (LEVAQUIN) tablet 750 mg  750 mg Oral Daily Chichi Núñez MD   750 mg at 23 1040    apixaban (ELIQUIS) tablet 5 mg  5 mg Oral
PULMONARY NOTE  [facility]    Name: Terry Bal MRN: [id number]   : 1950 Hospital: [facility]   Date: 2023  Admission date: 2023 Hospital Day: 4       HPI:     Patient Active Problem List   Diagnosis    SOB (shortness of breath)    Dyspnea    Partial small bowel obstruction (HCC)    SBO (small bowel obstruction) (HCC)    Shortness of breath             [x] High complexity decision making was performed  [x] See my orders for details      Subjective/Initial History:     I was asked by Inessa Cox MD to see Terry Bal  a 75 y.o.    female in consultation     Excerpts from admission 2023 or consult notes as follows:   70-year lady came in because of shortness of breath dyspnea and chest pain she has past medical history of chronic A-fib hypertension hyperlipidemia diabetes mellitus personal history of COVID-2021 she came in because shortness of breath and dyspnea, she is a lifetime non-smoker so admitted and pulm consult was called, CAT scan of the chest was done was negative for embolism but shows chronic scarring most likely secondary to COVID       Allergies   Allergen Reactions    Doxycycline      Other reaction(s): Unknown (comments)  HIVES    Metoprolol      Other reaction(s): Unknown (comments)  HIVES        MAR reviewed and pertinent medications noted or modified as needed     Current Facility-Administered Medications   Medication Dose Route Frequency Provider Last Rate Last Admin    ipratropium 0.5 mg-albuterol 2.5 mg (DUONEB) nebulizer solution 1 Dose  1 Dose Inhalation Q6H PRN Ina Núñez MD        furosemide (LASIX) injection 40 mg  40 mg IntraVENous Once Inessa Cox MD        predniSONE (DELTASONE) tablet 20 mg  20 mg Oral Daily Chichi Núñez MD   20 mg at 23 1041    levoFLOXacin (LEVAQUIN) tablet 750 mg  750 mg Oral Daily Chichi Núñez MD   750 mg at 23 1040    apixaban (ELIQUIS) tablet 5 mg  5 mg Oral
Patient is discharge home with home health. This nurse went over all discharge instructions with patient. Patient verbalized understanding of all information given with no questions at this time. patient is now taken out of hospital By RN to personal vehicle.
Physician Progress Note      PATIENT:               Megan Flores  CSN #:                  [id number]  :                       1950  ADMIT DATE:       2023 1:06 PM  [address] [place] DATE:  RESPONDING  PROVIDER #:        Jackie Swan MD          QUERY TEXT:    Patient admitted with respiratory failure, noted to have  atrial fibrillation   and is maintained on eliquis medication specified). If possible, please   document in progress notes and discharge summary if you are evaluating and/or   treating any of the following: The medical record reflects the following:  Risk Factors: A fib  Clinical Indicators:  H/P  Chronic A-fib    Treatment: eliquis    Thank you,  Christina Barajas RN, CCDS, [facility]  Certified Clinical Documentation   [phone]  you can also reach me by perfect serve. Options provided:  -- Secondary hypercoagulable state in a patient with atrial fibrillation  -- Other - I will add my own diagnosis  -- Disagree - Not applicable / Not valid  -- Disagree - Clinically unable to determine / Unknown  -- Refer to Clinical Documentation Reviewer    PROVIDER RESPONSE TEXT:    This patient has secondary hypercoagulable state in a patient with atrial   fibrillation. Query created by: Usman Martinez on 2023 2:13 PM      QUERY TEXT:    Pt admitted with acute respiratory failure and uses oxygen at home . If   possible, please document in progress notes and discharge summary further   specificity regarding the type and acuity of respiratory failure: The medical record reflects the following:  Risk Factors: resp failure  Clinical Indicators:  Acute hypoxic respiratory failure      ED  She was discharged home on 2 L of oxygen which she is requiring all the time   now.     Treatment: supplemental oxygen at 2 lts as at home    Thank you,  Christina Barajas RN, CCDS, [facility]  Certified Clinical Documentation   [phone]  you can also reach me by perfect
Primary MD notified of patient declining PO Gabapentin, PO Lisinopril and PO Diltiazem. Order received to discontinue Gabapentin.
This nurse notified attending of patient BP being elevated before discharge this nurse receive verbal order VIA telephone with read back this nurse will check BP again before discharge
This nurse received verbal orders Via telephone from attending for cardiology consult for patient.  This nurse also notified attend of blood sugar orders are in place and this nurse will continue to follow up with patient
1031    dilTIAZem (CARDIZEM 12 HR) extended release capsule 120 mg, 120 mg, Oral, Daily, [place] MD Sunny, 120 mg at 06/20/23 0838    famotidine (PEPCID) tablet 40 mg, 40 mg, Oral, BID, Chichi Núñez MD, 40 mg at 06/21/23 1030    lisinopril (PRINIVIL;ZESTRIL) tablet 40 mg, 40 mg, Oral, Daily, Zachary Núñez MD, 40 mg at 06/20/23 6145    atorvastatin (LIPITOR) tablet 40 mg, 40 mg, Oral, Nightly, Chichi Núñez MD, 40 mg at 06/20/23 2028    furosemide (LASIX) tablet 40 mg, 40 mg, Oral, Daily, Chichi Núñez MD, 40 mg at 06/21/23 1031    insulin lispro (HUMALOG) injection vial 0-16 Units, 0-16 Units, SubCUTAneous, TID WC, Chichi Núñez MD, 4 Units at 06/20/23 1652    insulin lispro (HUMALOG) injection vial 0-4 Units, 0-4 Units, SubCUTAneous, Nightly, Chichi Núñez MD    glucose chewable tablet 16 g, 4 tablet, Oral, PRN, Zachary Núñez MD    dextrose bolus 10% 125 mL, 125 mL, IntraVENous, PRN **OR** dextrose bolus 10% 250 mL, 250 mL, IntraVENous, PRN, Zachary Núñez MD    glucagon injection 1 mg, 1 mg, SubCUTAneous, PRN, Zachary Núñez MD    dextrose 10 % infusion, , IntraVENous, Continuous PRN, Zachary Núñez MD    sodium chloride flush 0.9 % injection 5-40 mL, 5-40 mL, IntraVENous, 2 times per day, [place] MD Sunny, 10 mL at 06/21/23 1031    sodium chloride flush 0.9 % injection 5-40 mL, 5-40 mL, IntraVENous, PRN, Zachary Núñez MD    0.9 % sodium chloride infusion, , IntraVENous, PRN, Zachary Núñez MD    ondansetron (ZOFRAN-ODT) disintegrating tablet 4 mg, 4 mg, Oral, Q8H PRN **OR** ondansetron (ZOFRAN) injection 4 mg, 4 mg, IntraVENous, Q6H PRN, Zachary Núñez MD    polyethylene glycol (GLYCOLAX) packet 17 g, 17 g, Oral, Daily PRN, Zachary Núñez MD    acetaminophen (TYLENOL) tablet 650 mg, 650 mg, Oral, Q6H PRN, 650 mg at 06/21/23 1029 **OR** acetaminophen (TYLENOL) suppository 650 mg, 650 mg, Rectal, Q6H PRN, Zachary Núñez MD    levoFLOXacin (LEVAQUIN) 750 MG/150ML infusion
12.9 FL    Nucleated RBCs 0.0 0.0  WBC    nRBC 0.00 0.00 - 0.01 K/uL    Neutrophils % 78 (H) 32 - 75 %    Lymphocytes % 20 12 - 49 %    Monocytes % 0 (L) 5 - 13 %    Eosinophils % 0 0 - 7 %    Basophils % 0 0 - 1 %    Other cells, fluid 2 %    Immature Granulocytes 0 %    Neutrophils Absolute 2.3 1.8 - 8.0 K/UL    Lymphocytes Absolute 0.6 (L) 0.8 - 3.5 K/UL    Monocytes Absolute 0.0 0.0 - 1.0 K/UL    Eosinophils Absolute 0.0 0.0 - 0.4 K/UL    Basophils Absolute 0.0 0.0 - 0.1 K/UL    Absolute Immature Granulocyte 0.0 K/UL    Differential Type Manual      RBC Comment Normocytic, Normochromic     POCT Glucose    Collection Time: 06/20/23  7:36 AM   Result Value Ref Range    POC Glucose 163 (H) 65 - 100 mg/dL    Performed by: Tung Kennedy (Float Pool)    POCT Glucose    Collection Time: 06/20/23 11:30 AM   Result Value Ref Range    POC Glucose 207 (H) 65 - 100 mg/dL    Performed by: Tung Kennedy [facility])        Results       ** No results found for the last 336 hours. **             CTA CHEST W WO CONTRAST    Result Date: 6/19/2023  1. No acute pulmonary embolism. 2. Mild bilateral lower lung atelectasis or scarring, similar to 10/5/2021. XR CHEST PORTABLE    Result Date: 6/19/2023  No evidence of acute cardiopulmonary process. Labs:     Recent Labs     06/19/23  1417 06/20/23  0532   WBC 3.3* 2.9*   HGB 10.5* 11.6   HCT 34.6* 38.3    170       Recent Labs     06/19/23  1417 06/20/23  0532    141   K 4.8 3.8    103   CO2 37* 35*   BUN 19 15   CREATININE 0.59 0.53*   GLUCOSE 113* 177*   CALCIUM 10.0 10.3*   MG 2.0  --        Recent Labs     06/19/23  1417 06/20/23  0532   AST 19 12*   ALT 19 17   ALKPHOS 102 92   BILITOT 0.5 0.5   LABALBU 3.0* 3.3*   GLOB 3.1 3.7       No results for input(s): INR, PROTIME, APTT in the last 72 hours. No results for input(s): IRON, TIBC, FERRITIN in the last 72 hours.     Invalid input(s): PSAT   No results found for: Carmela Springer, RBCF

## 2023-06-22 NOTE — DISCHARGE SUMMARY
today with chest pain shortness of breath blood pressure was low seen by the ER physician and recommended patient to be admitted for further evaluation and treatment  Blood work was showing BNP was 285 troponin was negative  CT T of the chest negative     6/20  Patient sitting comfortably in bed. Not currently wearing oxygen. Denies SOB, chest pain, fevers, N/V. Pt states the SOB comes and goes since she had COVID in 2021 and was recently discharged with pneumonia. 6/21  Patient sitting in bed wearing 2L oxygen via NC. Admits headache and shortness of breath with exertion, denies chest pain, fevers, N/V. Neutropenia resolved from yesterday. 6/22  Pt sitting in bed wearing 3L oxygen via NC. Denies headache, SOB, chest pain. Echocardiogram from yesterday pending, cardiology will see pt today.        Patient seen by the cardiology today echo was normal cleared for discharge      Medication reconciliation done discussed with the patient daughter on the phone    Updated patient condition and discharge planning      Time discharge 35 minutes      Signed:   Mavis Mcclelladn MD  6/22/2023  11:58 AM

## 2024-01-01 ENCOUNTER — APPOINTMENT (OUTPATIENT)
Facility: HOSPITAL | Age: 74
End: 2024-01-01
Payer: MEDICARE

## 2024-01-01 ENCOUNTER — HOSPITAL ENCOUNTER (EMERGENCY)
Facility: HOSPITAL | Age: 74
End: 2024-03-19
Attending: EMERGENCY MEDICINE
Payer: MEDICARE

## 2024-01-01 VITALS
WEIGHT: 185 LBS | OXYGEN SATURATION: 90 % | HEIGHT: 64 IN | HEART RATE: 93 BPM | TEMPERATURE: 97.9 F | RESPIRATION RATE: 22 BRPM | SYSTOLIC BLOOD PRESSURE: 57 MMHG | DIASTOLIC BLOOD PRESSURE: 22 MMHG | BODY MASS INDEX: 31.58 KG/M2

## 2024-01-01 DIAGNOSIS — A41.9 SEPTIC SHOCK (HCC): ICD-10-CM

## 2024-01-01 DIAGNOSIS — K56.609 SBO (SMALL BOWEL OBSTRUCTION) (HCC): Primary | ICD-10-CM

## 2024-01-01 DIAGNOSIS — E87.20 METABOLIC ACIDOSIS: ICD-10-CM

## 2024-01-01 DIAGNOSIS — J96.02 ACUTE RESPIRATORY FAILURE WITH HYPERCAPNIA (HCC): ICD-10-CM

## 2024-01-01 DIAGNOSIS — R65.21 SEPTIC SHOCK (HCC): ICD-10-CM

## 2024-01-01 LAB
ALBUMIN SERPL-MCNC: 4.2 G/DL (ref 3.5–5)
ALBUMIN/GLOB SERPL: 1 (ref 1.1–2.2)
ALP SERPL-CCNC: 95 U/L (ref 45–117)
ALT SERPL-CCNC: 32 U/L (ref 12–78)
ANION GAP SERPL CALC-SCNC: 4 MMOL/L (ref 5–15)
AST SERPL W P-5'-P-CCNC: 43 U/L (ref 15–37)
BASE EXCESS BLDV CALC-SCNC: 2.8 MMOL/L
BASOPHILS # BLD: 0 K/UL (ref 0–0.1)
BASOPHILS NFR BLD: 0 % (ref 0–1)
BILIRUB SERPL-MCNC: 0.8 MG/DL (ref 0.2–1)
BNP SERPL-MCNC: 230 PG/ML
BUN SERPL-MCNC: 22 MG/DL (ref 6–20)
BUN/CREAT SERPL: 25 (ref 12–20)
CA-I BLD-MCNC: 1.32 MMOL/L (ref 1.12–1.32)
CA-I BLD-MCNC: 11.3 MG/DL (ref 8.5–10.1)
CHLORIDE BLD-SCNC: 100 MMOL/L (ref 98–107)
CHLORIDE SERPL-SCNC: 98 MMOL/L (ref 97–108)
CO2 SERPL-SCNC: 35 MMOL/L (ref 21–32)
CREAT SERPL-MCNC: 0.88 MG/DL (ref 0.55–1.02)
CREAT UR-MCNC: 0.88 MG/DL (ref 0.6–1.3)
DIFFERENTIAL METHOD BLD: ABNORMAL
EKG ATRIAL RATE: 103 BPM
EKG DIAGNOSIS: NORMAL
EKG P AXIS: 60 DEGREES
EKG P-R INTERVAL: 174 MS
EKG Q-T INTERVAL: 366 MS
EKG QRS DURATION: 110 MS
EKG QTC CALCULATION (BAZETT): 479 MS
EKG R AXIS: -47 DEGREES
EKG T AXIS: 99 DEGREES
EKG VENTRICULAR RATE: 103 BPM
EOSINOPHIL # BLD: 0 K/UL (ref 0–0.4)
EOSINOPHIL NFR BLD: 0 % (ref 0–7)
ERYTHROCYTE [DISTWIDTH] IN BLOOD BY AUTOMATED COUNT: 12.8 % (ref 11.5–14.5)
GLOBULIN SER CALC-MCNC: 4.4 G/DL (ref 2–4)
GLUCOSE BLD STRIP.AUTO-MCNC: 143 MG/DL (ref 65–100)
GLUCOSE BLD STRIP.AUTO-MCNC: 186 MG/DL (ref 65–100)
GLUCOSE SERPL-MCNC: 219 MG/DL (ref 65–100)
HCO3 BLDV-SCNC: 35.8 MMOL/L (ref 22–26)
HCT VFR BLD AUTO: 41.8 % (ref 35–47)
HCT VFR BLD AUTO: 45.4 % (ref 35–47)
HGB BLD-MCNC: 12.6 G/DL (ref 11.5–16)
HGB BLD-MCNC: 14.2 G/DL (ref 11.5–16)
IMM GRANULOCYTES # BLD AUTO: 0 K/UL (ref 0–0.04)
IMM GRANULOCYTES NFR BLD AUTO: 0 % (ref 0–0.5)
LACTATE BLD-SCNC: 10.19 MMOL/L (ref 0.4–2)
LIPASE SERPL-CCNC: 724 U/L (ref 13–75)
LYMPHOCYTES # BLD: 0.8 K/UL (ref 0.8–3.5)
LYMPHOCYTES NFR BLD: 10 % (ref 12–49)
MCH RBC QN AUTO: 28.3 PG (ref 26–34)
MCHC RBC AUTO-ENTMCNC: 31.3 G/DL (ref 30–36.5)
MCV RBC AUTO: 90.6 FL (ref 80–99)
MONOCYTES # BLD: 0.4 K/UL (ref 0–1)
MONOCYTES NFR BLD: 5 % (ref 5–13)
NEUTS SEG # BLD: 7.2 K/UL (ref 1.8–8)
NEUTS SEG NFR BLD: 85 % (ref 32–75)
NRBC # BLD: 0 K/UL (ref 0–0.01)
NRBC BLD-RTO: 0 PER 100 WBC
PCO2 BLD: >110 MMHG (ref 35–45)
PCO2 BLDV: 103.9 MMHG (ref 41–52)
PCO2 BLDV: >110 MMHG (ref 41–52)
PERFORMED BY:: ABNORMAL
PH BLD: 7.08 (ref 7.35–7.45)
PH BLDV: 7.08 (ref 7.23–7.44)
PH BLDV: 7.15 (ref 7.23–7.44)
PLATELET # BLD AUTO: 190 K/UL (ref 150–400)
PMV BLD AUTO: 11.9 FL (ref 8.9–12.9)
PO2 BLD: <27 MMHG (ref 75–100)
PO2 BLDV: 32 MMHG (ref 25–40)
PO2 BLDV: 75 MMHG (ref 25–40)
POTASSIUM BLD-SCNC: 3.1 MMOL/L (ref 3.5–5.5)
POTASSIUM SERPL-SCNC: 4.7 MMOL/L (ref 3.5–5.1)
PROT SERPL-MCNC: 8.6 G/DL (ref 6.4–8.2)
RBC # BLD AUTO: 5.01 M/UL (ref 3.8–5.2)
SAO2 % BLDV: 41.7 %
SERVICE CMNT-IMP: ABNORMAL
SODIUM BLD-SCNC: 145 MMOL/L (ref 136–145)
SODIUM SERPL-SCNC: 137 MMOL/L (ref 136–145)
SPECIMEN SITE: ABNORMAL
SPECIMEN TYPE: ABNORMAL
SPECIMEN TYPE: ABNORMAL
WBC # BLD AUTO: 8.4 K/UL (ref 3.6–11)

## 2024-01-01 PROCEDURE — 99285 EMERGENCY DEPT VISIT HI MDM: CPT

## 2024-01-01 PROCEDURE — 76705 ECHO EXAM OF ABDOMEN: CPT

## 2024-01-01 PROCEDURE — 6360000002 HC RX W HCPCS

## 2024-01-01 PROCEDURE — 84295 ASSAY OF SERUM SODIUM: CPT

## 2024-01-01 PROCEDURE — 82330 ASSAY OF CALCIUM: CPT

## 2024-01-01 PROCEDURE — 96376 TX/PRO/DX INJ SAME DRUG ADON: CPT

## 2024-01-01 PROCEDURE — 74022 RADEX COMPL AQT ABD SERIES: CPT

## 2024-01-01 PROCEDURE — 80053 COMPREHEN METABOLIC PANEL: CPT

## 2024-01-01 PROCEDURE — 82803 BLOOD GASES ANY COMBINATION: CPT

## 2024-01-01 PROCEDURE — 86900 BLOOD TYPING SEROLOGIC ABO: CPT

## 2024-01-01 PROCEDURE — 2500000003 HC RX 250 WO HCPCS: Performed by: STUDENT IN AN ORGANIZED HEALTH CARE EDUCATION/TRAINING PROGRAM

## 2024-01-01 PROCEDURE — 6360000002 HC RX W HCPCS: Performed by: EMERGENCY MEDICINE

## 2024-01-01 PROCEDURE — 82947 ASSAY GLUCOSE BLOOD QUANT: CPT

## 2024-01-01 PROCEDURE — 86901 BLOOD TYPING SEROLOGIC RH(D): CPT

## 2024-01-01 PROCEDURE — 6360000002 HC RX W HCPCS: Performed by: STUDENT IN AN ORGANIZED HEALTH CARE EDUCATION/TRAINING PROGRAM

## 2024-01-01 PROCEDURE — 2580000003 HC RX 258: Performed by: STUDENT IN AN ORGANIZED HEALTH CARE EDUCATION/TRAINING PROGRAM

## 2024-01-01 PROCEDURE — 82962 GLUCOSE BLOOD TEST: CPT

## 2024-01-01 PROCEDURE — 86850 RBC ANTIBODY SCREEN: CPT

## 2024-01-01 PROCEDURE — 83605 ASSAY OF LACTIC ACID: CPT

## 2024-01-01 PROCEDURE — 96361 HYDRATE IV INFUSION ADD-ON: CPT

## 2024-01-01 PROCEDURE — 87040 BLOOD CULTURE FOR BACTERIA: CPT

## 2024-01-01 PROCEDURE — 71045 X-RAY EXAM CHEST 1 VIEW: CPT

## 2024-01-01 PROCEDURE — 85018 HEMOGLOBIN: CPT

## 2024-01-01 PROCEDURE — 36430 TRANSFUSION BLD/BLD COMPNT: CPT

## 2024-01-01 PROCEDURE — 74177 CT ABD & PELVIS W/CONTRAST: CPT

## 2024-01-01 PROCEDURE — 96366 THER/PROPH/DIAG IV INF ADDON: CPT

## 2024-01-01 PROCEDURE — 2500000003 HC RX 250 WO HCPCS

## 2024-01-01 PROCEDURE — 86920 COMPATIBILITY TEST SPIN: CPT

## 2024-01-01 PROCEDURE — 85025 COMPLETE CBC W/AUTO DIFF WBC: CPT

## 2024-01-01 PROCEDURE — 6360000004 HC RX CONTRAST MEDICATION: Performed by: EMERGENCY MEDICINE

## 2024-01-01 PROCEDURE — 83690 ASSAY OF LIPASE: CPT

## 2024-01-01 PROCEDURE — 2580000003 HC RX 258: Performed by: INTERNAL MEDICINE

## 2024-01-01 PROCEDURE — 74018 RADEX ABDOMEN 1 VIEW: CPT

## 2024-01-01 PROCEDURE — 2580000003 HC RX 258: Performed by: EMERGENCY MEDICINE

## 2024-01-01 PROCEDURE — 92950 HEART/LUNG RESUSCITATION CPR: CPT

## 2024-01-01 PROCEDURE — 36415 COLL VENOUS BLD VENIPUNCTURE: CPT

## 2024-01-01 PROCEDURE — 96368 THER/DIAG CONCURRENT INF: CPT

## 2024-01-01 PROCEDURE — 96375 TX/PRO/DX INJ NEW DRUG ADDON: CPT

## 2024-01-01 PROCEDURE — 93005 ELECTROCARDIOGRAM TRACING: CPT | Performed by: EMERGENCY MEDICINE

## 2024-01-01 PROCEDURE — 2500000003 HC RX 250 WO HCPCS: Performed by: EMERGENCY MEDICINE

## 2024-01-01 PROCEDURE — 85014 HEMATOCRIT: CPT

## 2024-01-01 PROCEDURE — 83880 ASSAY OF NATRIURETIC PEPTIDE: CPT

## 2024-01-01 PROCEDURE — 96365 THER/PROPH/DIAG IV INF INIT: CPT

## 2024-01-01 PROCEDURE — 84132 ASSAY OF SERUM POTASSIUM: CPT

## 2024-01-01 PROCEDURE — P9016 RBC LEUKOCYTES REDUCED: HCPCS

## 2024-01-01 PROCEDURE — 31500 INSERT EMERGENCY AIRWAY: CPT

## 2024-01-01 PROCEDURE — 87154 CUL TYP ID BLD PTHGN 6+ TRGT: CPT

## 2024-01-01 RX ORDER — SODIUM CHLORIDE 9 MG/ML
INJECTION, SOLUTION INTRAVENOUS PRN
Status: DISCONTINUED | OUTPATIENT
Start: 2024-01-01 | End: 2024-01-01 | Stop reason: HOSPADM

## 2024-01-01 RX ORDER — VASOPRESSIN 20 U/ML
INJECTION PARENTERAL
Status: DISCONTINUED
Start: 2024-01-01 | End: 2024-01-01 | Stop reason: HOSPADM

## 2024-01-01 RX ORDER — 0.9 % SODIUM CHLORIDE 0.9 %
1000 INTRAVENOUS SOLUTION INTRAVENOUS ONCE
Status: COMPLETED | OUTPATIENT
Start: 2024-01-01 | End: 2024-01-01

## 2024-01-01 RX ORDER — ONDANSETRON 2 MG/ML
4 INJECTION INTRAMUSCULAR; INTRAVENOUS ONCE
Status: COMPLETED | OUTPATIENT
Start: 2024-01-01 | End: 2024-01-01

## 2024-01-01 RX ORDER — ETOMIDATE 2 MG/ML
10 INJECTION INTRAVENOUS
Status: COMPLETED | OUTPATIENT
Start: 2024-01-01 | End: 2024-01-01

## 2024-01-01 RX ORDER — EPINEPHRINE IN SOD CHLOR,ISO 1 MG/10 ML
SYRINGE (ML) INTRAVENOUS DAILY PRN
Status: COMPLETED | OUTPATIENT
Start: 2024-01-01 | End: 2024-01-01

## 2024-01-01 RX ORDER — SUCCINYLCHOLINE CHLORIDE 20 MG/ML
100 INJECTION INTRAMUSCULAR; INTRAVENOUS
Status: COMPLETED | OUTPATIENT
Start: 2024-01-01 | End: 2024-01-01

## 2024-01-01 RX ORDER — METOCLOPRAMIDE HYDROCHLORIDE 5 MG/ML
5 INJECTION INTRAMUSCULAR; INTRAVENOUS
Status: COMPLETED | OUTPATIENT
Start: 2024-01-01 | End: 2024-01-01

## 2024-01-01 RX ORDER — FENTANYL CITRATE-0.9 % NACL/PF 10 MCG/ML
25-200 PLASTIC BAG, INJECTION (ML) INTRAVENOUS CONTINUOUS
Status: DISCONTINUED | OUTPATIENT
Start: 2024-01-01 | End: 2024-01-01 | Stop reason: HOSPADM

## 2024-01-01 RX ORDER — ETOMIDATE 2 MG/ML
INJECTION INTRAVENOUS
Status: COMPLETED
Start: 2024-01-01 | End: 2024-01-01

## 2024-01-01 RX ORDER — FENTANYL CITRATE-0.9 % NACL/PF 10 MCG/ML
PLASTIC BAG, INJECTION (ML) INTRAVENOUS
Status: COMPLETED
Start: 2024-01-01 | End: 2024-01-01

## 2024-01-01 RX ORDER — PHENYLEPHRINE HCL IN 0.9% NACL 50MG/250ML
10-300 PLASTIC BAG, INJECTION (ML) INTRAVENOUS CONTINUOUS
Status: DISCONTINUED | OUTPATIENT
Start: 2024-01-01 | End: 2024-01-01 | Stop reason: HOSPADM

## 2024-01-01 RX ORDER — NOREPINEPHRINE BITARTRATE 0.06 MG/ML
1-100 INJECTION, SOLUTION INTRAVENOUS CONTINUOUS
Status: DISCONTINUED | OUTPATIENT
Start: 2024-01-01 | End: 2024-01-01 | Stop reason: HOSPADM

## 2024-01-01 RX ORDER — SODIUM CHLORIDE, SODIUM LACTATE, POTASSIUM CHLORIDE, AND CALCIUM CHLORIDE .6; .31; .03; .02 G/100ML; G/100ML; G/100ML; G/100ML
1000 INJECTION, SOLUTION INTRAVENOUS ONCE
Status: COMPLETED | OUTPATIENT
Start: 2024-01-01 | End: 2024-01-01

## 2024-01-01 RX ORDER — MORPHINE SULFATE 4 MG/ML
4 INJECTION, SOLUTION INTRAMUSCULAR; INTRAVENOUS
Status: COMPLETED | OUTPATIENT
Start: 2024-01-01 | End: 2024-01-01

## 2024-01-01 RX ORDER — FENTANYL CITRATE 50 UG/ML
50 INJECTION, SOLUTION INTRAMUSCULAR; INTRAVENOUS
Status: COMPLETED | OUTPATIENT
Start: 2024-01-01 | End: 2024-01-01

## 2024-01-01 RX ORDER — SUCCINYLCHOLINE CHLORIDE 20 MG/ML
INJECTION INTRAMUSCULAR; INTRAVENOUS
Status: COMPLETED
Start: 2024-01-01 | End: 2024-01-01

## 2024-01-01 RX ADMIN — CEFEPIME 2000 MG: 2 INJECTION, POWDER, FOR SOLUTION INTRAVENOUS at 20:44

## 2024-01-01 RX ADMIN — SODIUM CHLORIDE 1000 ML: 9 INJECTION, SOLUTION INTRAVENOUS at 16:45

## 2024-01-01 RX ADMIN — EPINEPHRINE 5 MCG/MIN: 1 INJECTION INTRAMUSCULAR; INTRAVENOUS; SUBCUTANEOUS at 22:34

## 2024-01-01 RX ADMIN — ETOMIDATE 10 MG: 2 INJECTION, SOLUTION INTRAVENOUS at 20:27

## 2024-01-01 RX ADMIN — SODIUM BICARBONATE 50 MEQ: 84 INJECTION, SOLUTION INTRAVENOUS at 23:03

## 2024-01-01 RX ADMIN — Medication 30 MCG/MIN: at 23:08

## 2024-01-01 RX ADMIN — EPINEPHRINE 20 MCG/MIN: 1 INJECTION INTRAMUSCULAR; INTRAVENOUS; SUBCUTANEOUS at 22:41

## 2024-01-01 RX ADMIN — Medication 30 MCG/MIN: at 21:00

## 2024-01-01 RX ADMIN — EPINEPHRINE 1 MG: 0.1 INJECTION INTRACARDIAC; INTRAVENOUS at 00:30

## 2024-01-01 RX ADMIN — SUCCINYLCHOLINE CHLORIDE 100 MG: 20 INJECTION INTRAMUSCULAR; INTRAVENOUS at 20:27

## 2024-01-01 RX ADMIN — MORPHINE SULFATE 4 MG: 4 INJECTION, SOLUTION INTRAMUSCULAR; INTRAVENOUS at 16:45

## 2024-01-01 RX ADMIN — SODIUM BICARBONATE: 84 INJECTION, SOLUTION INTRAVENOUS at 22:41

## 2024-01-01 RX ADMIN — ETOMIDATE 10 MG: 2 INJECTION INTRAVENOUS at 20:27

## 2024-01-01 RX ADMIN — EPINEPHRINE 1 MG: 0.1 INJECTION INTRACARDIAC; INTRAVENOUS at 00:38

## 2024-01-01 RX ADMIN — IOPAMIDOL 100 ML: 755 INJECTION, SOLUTION INTRAVENOUS at 19:40

## 2024-01-01 RX ADMIN — ONDANSETRON 4 MG: 2 INJECTION INTRAMUSCULAR; INTRAVENOUS at 16:45

## 2024-01-01 RX ADMIN — Medication 50 MEQ: at 23:03

## 2024-01-01 RX ADMIN — EPINEPHRINE 1 MG: 0.1 INJECTION INTRACARDIAC; INTRAVENOUS at 00:41

## 2024-01-01 RX ADMIN — ONDANSETRON 4 MG: 2 INJECTION INTRAMUSCULAR; INTRAVENOUS at 18:33

## 2024-01-01 RX ADMIN — Medication 50 MCG/HR: at 21:00

## 2024-01-01 RX ADMIN — SUCCINYLCHOLINE CHLORIDE 100 MG: 20 INJECTION, SOLUTION INTRAMUSCULAR; INTRAVENOUS at 20:27

## 2024-01-01 RX ADMIN — SODIUM CHLORIDE 1000 ML: 9 INJECTION, SOLUTION INTRAVENOUS at 21:31

## 2024-01-01 RX ADMIN — VASOPRESSIN 0.03 UNITS/MIN: 20 INJECTION INTRAVENOUS at 22:17

## 2024-01-01 RX ADMIN — FENTANYL CITRATE 50 MCG: 50 INJECTION INTRAMUSCULAR; INTRAVENOUS at 18:33

## 2024-01-01 RX ADMIN — METOCLOPRAMIDE 5 MG: 5 INJECTION, SOLUTION INTRAMUSCULAR; INTRAVENOUS at 19:22

## 2024-01-01 RX ADMIN — VASOPRESSIN 0.03 UNITS/MIN: 20 INJECTION INTRAVENOUS at 22:10

## 2024-01-01 RX ADMIN — SODIUM CHLORIDE, POTASSIUM CHLORIDE, SODIUM LACTATE AND CALCIUM CHLORIDE 1000 ML: 600; 310; 30; 20 INJECTION, SOLUTION INTRAVENOUS at 22:13

## 2024-01-01 RX ADMIN — FENTANYL CITRATE 50 MCG/HR: at 21:00

## 2024-01-01 ASSESSMENT — PAIN - FUNCTIONAL ASSESSMENT: PAIN_FUNCTIONAL_ASSESSMENT: 0-10

## 2024-01-01 ASSESSMENT — LIFESTYLE VARIABLES
HOW OFTEN DO YOU HAVE A DRINK CONTAINING ALCOHOL: NEVER
HOW MANY STANDARD DRINKS CONTAINING ALCOHOL DO YOU HAVE ON A TYPICAL DAY: PATIENT DOES NOT DRINK

## 2024-01-01 ASSESSMENT — PAIN SCALES - GENERAL
PAINLEVEL_OUTOF10: 10

## 2024-01-01 ASSESSMENT — PAIN DESCRIPTION - LOCATION
LOCATION: ABDOMEN
LOCATION: ABDOMEN

## 2024-01-01 ASSESSMENT — PULMONARY FUNCTION TESTS: PIF_VALUE: 47

## 2024-01-24 ENCOUNTER — APPOINTMENT (OUTPATIENT)
Facility: HOSPITAL | Age: 74
End: 2024-01-24
Payer: MEDICARE

## 2024-01-24 ENCOUNTER — HOSPITAL ENCOUNTER (EMERGENCY)
Facility: HOSPITAL | Age: 74
Discharge: HOME | End: 2024-01-24
Attending: EMERGENCY MEDICINE
Payer: MEDICARE

## 2024-01-24 VITALS
SYSTOLIC BLOOD PRESSURE: 132 MMHG | RESPIRATION RATE: 17 BRPM | DIASTOLIC BLOOD PRESSURE: 76 MMHG | HEIGHT: 64 IN | HEART RATE: 74 BPM | BODY MASS INDEX: 31.92 KG/M2 | OXYGEN SATURATION: 98 % | TEMPERATURE: 98.2 F | WEIGHT: 187 LBS

## 2024-01-24 DIAGNOSIS — U07.1 COVID-19: Primary | ICD-10-CM

## 2024-01-24 DIAGNOSIS — R07.9 CHEST PAIN, UNSPECIFIED TYPE: ICD-10-CM

## 2024-01-24 LAB
ANION GAP SERPL CALC-SCNC: ABNORMAL MMOL/L (ref 5–15)
BASOPHILS # BLD: 0 K/UL (ref 0–0.1)
BASOPHILS NFR BLD: 1 % (ref 0–1)
BNP SERPL-MCNC: 86 PG/ML
BUN SERPL-MCNC: 21 MG/DL (ref 6–20)
BUN/CREAT SERPL: 29 (ref 12–20)
CA-I BLD-MCNC: 9.6 MG/DL (ref 8.5–10.1)
CHLORIDE SERPL-SCNC: 104 MMOL/L (ref 97–108)
CO2 SERPL-SCNC: 36 MMOL/L (ref 21–32)
CREAT SERPL-MCNC: 0.73 MG/DL (ref 0.55–1.02)
DIFFERENTIAL METHOD BLD: ABNORMAL
EKG ATRIAL RATE: 76 BPM
EKG ATRIAL RATE: 81 BPM
EKG DIAGNOSIS: NORMAL
EKG DIAGNOSIS: NORMAL
EKG P AXIS: 61 DEGREES
EKG P AXIS: 64 DEGREES
EKG P-R INTERVAL: 190 MS
EKG P-R INTERVAL: 208 MS
EKG Q-T INTERVAL: 394 MS
EKG Q-T INTERVAL: 414 MS
EKG QRS DURATION: 102 MS
EKG QRS DURATION: 114 MS
EKG QTC CALCULATION (BAZETT): 457 MS
EKG QTC CALCULATION (BAZETT): 465 MS
EKG R AXIS: -54 DEGREES
EKG R AXIS: -56 DEGREES
EKG T AXIS: 104 DEGREES
EKG T AXIS: 95 DEGREES
EKG VENTRICULAR RATE: 76 BPM
EKG VENTRICULAR RATE: 81 BPM
EOSINOPHIL # BLD: 0 K/UL (ref 0–0.4)
EOSINOPHIL NFR BLD: 1 % (ref 0–7)
ERYTHROCYTE [DISTWIDTH] IN BLOOD BY AUTOMATED COUNT: 13.1 % (ref 11.5–14.5)
FLUAV AG NPH QL IA: NEGATIVE
FLUBV AG NOSE QL IA: NEGATIVE
GLUCOSE SERPL-MCNC: 122 MG/DL (ref 65–100)
HCT VFR BLD AUTO: 40.7 % (ref 35–47)
HGB BLD-MCNC: 12.6 G/DL (ref 11.5–16)
IMM GRANULOCYTES # BLD AUTO: 0 K/UL (ref 0–0.04)
IMM GRANULOCYTES NFR BLD AUTO: 0 % (ref 0–0.5)
LYMPHOCYTES # BLD: 1.8 K/UL (ref 0.8–3.5)
LYMPHOCYTES NFR BLD: 54 % (ref 12–49)
MCH RBC QN AUTO: 27.8 PG (ref 26–34)
MCHC RBC AUTO-ENTMCNC: 31 G/DL (ref 30–36.5)
MCV RBC AUTO: 89.8 FL (ref 80–99)
MONOCYTES # BLD: 0.4 K/UL (ref 0–1)
MONOCYTES NFR BLD: 11 % (ref 5–13)
NEUTS SEG # BLD: 1.1 K/UL (ref 1.8–8)
NEUTS SEG NFR BLD: 33 % (ref 32–75)
NRBC # BLD: 0 K/UL (ref 0–0.01)
NRBC BLD-RTO: 0 PER 100 WBC
PLATELET # BLD AUTO: 150 K/UL (ref 150–400)
PMV BLD AUTO: 12.1 FL (ref 8.9–12.9)
POTASSIUM SERPL-SCNC: ABNORMAL MMOL/L (ref 3.5–5.1)
RBC # BLD AUTO: 4.53 M/UL (ref 3.8–5.2)
SARS-COV-2 RDRP RESP QL NAA+PROBE: DETECTED
SODIUM SERPL-SCNC: 137 MMOL/L (ref 136–145)
TROPONIN I SERPL HS-MCNC: 28 NG/L (ref 0–51)
TROPONIN I SERPL HS-MCNC: 36 NG/L (ref 0–51)
WBC # BLD AUTO: 3.3 K/UL (ref 3.6–11)

## 2024-01-24 PROCEDURE — 71045 X-RAY EXAM CHEST 1 VIEW: CPT

## 2024-01-24 PROCEDURE — 99285 EMERGENCY DEPT VISIT HI MDM: CPT

## 2024-01-24 PROCEDURE — 83880 ASSAY OF NATRIURETIC PEPTIDE: CPT

## 2024-01-24 PROCEDURE — 80048 BASIC METABOLIC PNL TOTAL CA: CPT

## 2024-01-24 PROCEDURE — 85025 COMPLETE CBC W/AUTO DIFF WBC: CPT

## 2024-01-24 PROCEDURE — 87635 SARS-COV-2 COVID-19 AMP PRB: CPT

## 2024-01-24 PROCEDURE — 71275 CT ANGIOGRAPHY CHEST: CPT

## 2024-01-24 PROCEDURE — 6360000004 HC RX CONTRAST MEDICATION: Performed by: EMERGENCY MEDICINE

## 2024-01-24 PROCEDURE — 36415 COLL VENOUS BLD VENIPUNCTURE: CPT

## 2024-01-24 PROCEDURE — 87804 INFLUENZA ASSAY W/OPTIC: CPT

## 2024-01-24 PROCEDURE — 93005 ELECTROCARDIOGRAM TRACING: CPT | Performed by: EMERGENCY MEDICINE

## 2024-01-24 PROCEDURE — 84484 ASSAY OF TROPONIN QUANT: CPT

## 2024-01-24 RX ORDER — TRAMADOL HYDROCHLORIDE 50 MG/1
50 TABLET ORAL EVERY 6 HOURS PRN
Qty: 12 TABLET | Refills: 0 | Status: SHIPPED | OUTPATIENT
Start: 2024-01-24 | End: 2024-01-27

## 2024-01-24 RX ORDER — TRAMADOL HYDROCHLORIDE 50 MG/1
50 TABLET ORAL
Status: DISCONTINUED | OUTPATIENT
Start: 2024-01-24 | End: 2024-01-24 | Stop reason: HOSPADM

## 2024-01-24 RX ORDER — MORPHINE SULFATE 2 MG/ML
2 INJECTION, SOLUTION INTRAMUSCULAR; INTRAVENOUS
Status: DISCONTINUED | OUTPATIENT
Start: 2024-01-24 | End: 2024-01-24 | Stop reason: HOSPADM

## 2024-01-24 RX ADMIN — IOPAMIDOL 100 ML: 755 INJECTION, SOLUTION INTRAVENOUS at 07:27

## 2024-01-24 ASSESSMENT — PAIN DESCRIPTION - LOCATION: LOCATION: CHEST

## 2024-01-24 ASSESSMENT — LIFESTYLE VARIABLES
HOW MANY STANDARD DRINKS CONTAINING ALCOHOL DO YOU HAVE ON A TYPICAL DAY: 1 OR 2
HOW OFTEN DO YOU HAVE A DRINK CONTAINING ALCOHOL: MONTHLY OR LESS

## 2024-01-24 ASSESSMENT — PAIN - FUNCTIONAL ASSESSMENT: PAIN_FUNCTIONAL_ASSESSMENT: NONE - DENIES PAIN

## 2024-01-24 ASSESSMENT — HEART SCORE: ECG: 1

## 2024-01-24 ASSESSMENT — PAIN DESCRIPTION - ORIENTATION: ORIENTATION: MID

## 2024-01-24 ASSESSMENT — PAIN SCALES - GENERAL: PAINLEVEL_OUTOF10: 4

## 2024-01-24 ASSESSMENT — PAIN DESCRIPTION - DESCRIPTORS: DESCRIPTORS: SHARP

## 2024-01-24 NOTE — ED PROVIDER NOTES
[facility] EMERGENCY DEPT  EMERGENCY DEPARTMENT HISTORY AND PHYSICAL EXAM      Date: 1/24/2024  Patient Name: Griselda Shanks  MRN: [id number]  Birthdate 1950  Date of evaluation: 1/24/2024  Provider: Virgilio Sanford MD   Note Started: 5:40 AM EST 1/24/24    HISTORY OF PRESENT ILLNESS     Chief Complaint   Patient presents with    Chest Pain    Shortness of Breath       History Provided By: Patient and EMS    HPI: Griselda Shanks is a 73 y.o. female presents to the emergency department with complaint of chest pain and shortness of breath.  Patient ports pain started earlier this morning.  Patient ports pain is sharp and substernal.  Patient denies noted aggravating relieving factors.  Patient denies fevers or chills.  Patient reports has been having history with her breathing since prior diagnosis of COVID and was also again recently diagnosed with COVID.  Patient states she does not require oxygen at home.  EMS states they gave 324 mg aspirin and route.    PAST MEDICAL HISTORY   Past Medical History:  Past Medical History:   Diagnosis Date    Diabetes (HCC)     High cholesterol     Hypertension        Past Surgical History:  Past Surgical History:   Procedure Laterality Date    BREAST BIOPSY Left 4/6/2023    [facility] 3D JOAQUIN STEREO VAC BX BREAST LT 1ST LES W/CLIP SPEC 4/6/2023 [facility] RAD MAMMO    HEMORRHOID SURGERY      [facility] STEROTACT[facility] LOC BREAST BIOPSY LEFT Left 4/6/2023    [facility] STEROTACTIC LOC BREAST BIOPSY LEFT 4/6/2023 [facility] RAD MAMMO    TONSILLECTOMY      WISDOM TOOTH EXTRACTION         Family History:  No family history on file.    Social History:  Social History     Tobacco Use    Smoking status: Never    Smokeless tobacco: Never   Substance Use Topics    Alcohol use: Never    Drug use: Never       Allergies:  Allergies   Allergen Reactions    Doxycycline      Other reaction(s): Unknown (comments)  HIVES    Metoprolol      Other reaction(s): Unknown (comments)  HIVES       PCP: Rik Lou MD    Current

## 2024-01-24 NOTE — ED NOTES
Patient arrives via ems for CP, 2/10 non radiating. Endorses difficulty breathing. Placed 2L nc by ems, 324 asa given. Sats fine before oxygen.   , HX afib, [facility]     Wears oxygen intermittently at home, denies use in the last few days

## 2024-01-24 NOTE — DISCHARGE INSTRUCTIONS
Thank you!  Thank you for allowing me to care for you in the emergency department. It is my goal to provide you with excellent care.  Please fill out the survey that will come to you by mail or email since we listen to your feedback!     Below you will find a list of your tests from today's visit.  Should you have any questions, please do not hesitate to call the emergency department.    Labs  Recent Results (from the past 12 hour(s))   Troponin    Collection Time: 01/24/24  5:38 AM   Result Value Ref Range    Troponin, High Sensitivity 36 0 - 51 ng/L   Brain natriuretic peptide    Collection Time: 01/24/24  5:38 AM   Result Value Ref Range    NT Pro-BNP 86 <125 pg/mL   CBC with Auto Differential    Collection Time: 01/24/24  5:38 AM   Result Value Ref Range    WBC 3.3 (L) 3.6 - 11.0 K/uL    RBC 4.53 3.80 - 5.20 M/uL    Hemoglobin 12.6 11.5 - 16.0 g/dL    Hematocrit 40.7 35.0 - 47.0 %    MCV 89.8 80.0 - 99.0 FL    MCH 27.8 26.0 - 34.0 PG    MCHC 31.0 30.0 - 36.5 g/dL    RDW 13.1 11.5 - 14.5 %    Platelets 150 150 - 400 K/uL    MPV 12.1 8.9 - 12.9 FL    Nucleated RBCs 0.0 0.0  WBC    nRBC 0.00 0.00 - 0.01 K/uL    Neutrophils % 33 32 - 75 %    Lymphocytes % 54 (H) 12 - 49 %    Monocytes % 11 5 - 13 %    Eosinophils % 1 0 - 7 %    Basophils % 1 0 - 1 %    Immature Granulocytes 0 0 - 0.5 %    Neutrophils Absolute 1.1 (L) 1.8 - 8.0 K/UL    Lymphocytes Absolute 1.8 0.8 - 3.5 K/UL    Monocytes Absolute 0.4 0.0 - 1.0 K/UL    Eosinophils Absolute 0.0 0.0 - 0.4 K/UL    Basophils Absolute 0.0 0.0 - 0.1 K/UL    Absolute Immature Granulocyte 0.0 0.00 - 0.04 K/UL    Differential Type AUTOMATED     Basic Metabolic Panel w/ Reflex to MG    Collection Time: 01/24/24  5:38 AM   Result Value Ref Range    Sodium 137 136 - 145 mmol/L    Potassium Hemolyzed, Recollection Recommended 3.5 - 5.1 mmol/L    Chloride 104 97 - 108 mmol/L    CO2 36 (H) 21 - 32 mmol/L    Anion Gap NEG 3 5 - 15 mmol/L    Glucose 122 (H) 65 - 100 mg/dL

## 2024-01-24 NOTE — ED NOTES
Assumed care of patient at this time. Report received from VENUS Heller. Pt on continuous cardiac monitoring, pulse ox, and blood pressure monitoring at this time.

## 2024-03-18 NOTE — ED NOTES
CT tech came and got this RN states attempting to do CT scan but pt with vomiting while laying flat. This RN to CT medicated with zofran. Pt attempted to lay flat again but still with vomiting.       Pt brought back to ED, unable to complete CT scan. MD made aware.    Pt o2 also noted to be in low 80s. 6L oxygen applied with improvement. Md made aware and at bedside to assess pt. Will add xray.    Ultrasound called and made aware of US order.

## 2024-03-18 NOTE — ED NOTES
This RN escorted pt to CT via stretcher. Pt was able to lay flat for CT scan. Pt did have x1 episode of dark brown vomiting. MD made aware.

## 2024-03-18 NOTE — ED PROVIDER NOTES
[facility] EMERGENCY DEPT  EMERGENCY DEPARTMENT HISTORY AND PHYSICAL EXAM      Date: 3/18/2024  Patient Name: Griselda Shanks  MRN: [id number]  YOB: 1950  Date of evaluation: 3/18/2024  Provider: Ashok Ureña MD   Note Started: 4:28 PM EDT 3/18/24    HISTORY OF PRESENT ILLNESS     Chief Complaint   Patient presents with    Abdominal Pain    Emesis       History Provided By: Patient    HPI: Griselda Shanks is a 74 y.o. female with history of diabetes, hypertension, hyperlipidemia, bowel obstruction presenting with constant epigastric abdominal pain over the past 2 or 3 days.  Last bowel movement was about 4 days ago.  This is not unusual for her but she does take MiraLAX daily.  History of hysterectomy    PAST MEDICAL HISTORY   Past Medical History:  Past Medical History:   Diagnosis Date    Diabetes (HCC)     High cholesterol     Hypertension        Past Surgical History:  Past Surgical History:   Procedure Laterality Date    BREAST BIOPSY Left 4/6/2023    LINDSAY 3D JOAQUIN STEREO VAC BX BREAST LT 1ST LES W/CLIP SPEC 4/6/2023 [facility]    HEMORRHOID SURGERY      LINDSAY STEROTACTIC LOC BREAST BIOPSY LEFT Left 4/6/2023    LINDSAY STEROTACTIC LOC BREAST BIOPSY LEFT 4/6/2023 [facility]MO    TONSILLECTOMY      WISDOM TOOTH EXTRACTION         Family History:  No family history on file.    Social History:  Social History     Tobacco Use    Smoking status: Never    Smokeless tobacco: Never   Substance Use Topics    Alcohol use: Never    Drug use: Never       Allergies:  Allergies   Allergen Reactions    Doxycycline      Other reaction(s): Unknown (comments)  HIVES    Metoprolol      Other reaction(s): Unknown (comments)  HIVES       PCP: Rik Lou MD    Current Meds:   Current Facility-Administered Medications   Medication Dose Route Frequency Provider Last Rate Last Admin    ondansetron (ZOFRAN) injection 4 mg  4 mg IntraVENous Once Ashok Ureña MD        morphine (PF) injection 4 mg  4 mg IntraVENous  along. NG tube has already been placed [KK]   2133 Patient's blood pressures have been soft.  I suspect this is a inflammatory response from severe pneumonitis.  Blood cultures were obtained and she will be started on antibiotics to cover for aspiration pneumonia [KK]   2343 Patient has been escalated from chest Levophed quickly to vasopressin, epinephrine and now phenylephrine.  Her repeat pH showed persistent hypercapnia and now a metabolic acidosis.  She was given bicarb and vent settings were altered to increased rate.    Had long conversation with patient's daughters regarding her unlikely improvement.  Her blood pressures remain less than 60 systolic despite being on 4 pressors.    They had expressed in the past patient had not wanting to be in pain or uncomfortable.  Will not escalate care although there are not many options anyway is as she is on some new medications.  Will continue on her current pressors.  Discussed potentially withdrawing care but family are  not ready to make that decision yet. [KK]      ED Course User Index  [KK] Ashok Ureña MD       SEPSIS Reassessment: Sepsis reassessment not applicable    Clinical Management Tools:  Not Applicable    Patient was given the following medications:  Medications   ondansetron (ZOFRAN) injection 4 mg (4 mg IntraVENous Given 3/18/24 1645)   morphine (PF) injection 4 mg (4 mg IntraVENous Given 3/18/24 1645)   sodium chloride 0.9 % bolus 1,000 mL (0 mLs IntraVENous Stopped 3/18/24 1850)   fentaNYL (SUBLIMAZE) injection 50 mcg (50 mcg IntraVENous Given 3/18/24 1833)   ondansetron (ZOFRAN) injection 4 mg (4 mg IntraVENous Given 3/18/24 1833)   metoclopramide (REGLAN) injection 5 mg (5 mg IntraVENous Given 3/18/24 1922)   iopamidol (ISOVUE-370) 76 % injection 100 mL (100 mLs IntraVENous Given 3/18/24 1940)   ceFEPIme (MAXIPIME) 2,000 mg in sodium chloride 0.9 % 100 mL IVPB (mini-bag) (0 mg IntraVENous Stopped 3/18/24 2152)   etomidate (AMIDATE)

## 2024-03-19 NOTE — ED NOTES
Call back from medical examiner Arsenio. States patient is not an ME case and can be released to the  home.

## 2024-03-19 NOTE — ED NOTES
Pt family at bedside at bedside at this time, Dr. Ureña discussing plan of care with family.    [place] at bedside for support.

## 2024-03-19 NOTE — PROGRESS NOTES
Spiritual Care Assessment/Progress Note  [facility]    Name: Griselda Shanks MRN: [id number]    Age: 74 y.o.     Sex: female   Language: English     Date: 3/19/2024            Total Time Calculated: 195 min              Spiritual Assessment begun in [facility] EMERGENCY DEPT  Service Provided For:: Patient and family together  Referral/Consult From:: Nurse  Encounter Overview/Reason : Initial Encounter    Spiritual beliefs:      [x] Involved in a kendra tradition/spiritual practice: Latter day     [] Supported by a kendra community:      [] Claims no spiritual orientation:      [] Seeking spiritual identity:           [] Adheres to an individual form of spirituality:      [] Not able to assess:                Identified resources for coping and support system:   Support System: Children, Family members       [] Prayer                  [] Devotional reading               [] Music                  [] Guided Imagery     [] Pet visits                                        [] Other: (COMMENT)     Specific area/focus of visit   Encounter:    Crisis: Type: Code Blue  Spiritual/Emotional needs: Type: Spiritual Support, Emotional Distress  Ritual, Rites and Sacraments:    Grief, Loss, and Adjustments:    Ethics/Mediation:    Behavioral Health:    Palliative Care:    Advance Care Planning:      Plan/Referrals: Other (Comment) (No further visits required.)    Narrative: EMILY Thornton.  Intern responded to Code Blue. Patient being cared for by medical team. Consulted with nurse. Patient's daughter present. Daughter shared joyful and painful life/legacy/review including family, medical and Jew history. Daughter called patient's family members including younger child and brother. Physician in to consult with family. Other family members in to say their goodbyes to patient. Patient . Brother and family friend in to say their goodbyes. Provided time and space for reflection and sharing of life's sacred

## 2024-03-19 NOTE — ED NOTES
Due to pt VBG and decreased work of breathing decision to intubate made by MD.    RT called and at bedside.

## 2024-03-20 LAB
ABO + RH BLD: NORMAL
ACCESSION NUMBER, LLC1M: ABNORMAL
ACINETOBACTER CALCOAC BAUMANNII COMPLEX BY PCR: NOT DETECTED
BACTEROIDES FRAGILIS BY PCR: NOT DETECTED
BIOFIRE TEST COMMENT: ABNORMAL
BLD PROD TYP BPU: NORMAL
BLD PROD TYP BPU: NORMAL
BLOOD BANK BLOOD PRODUCT EXPIRATION DATE: NORMAL
BLOOD BANK DISPENSE STATUS: NORMAL
BLOOD BANK DISPENSE STATUS: NORMAL
BLOOD BANK ISBT PRODUCT BLOOD TYPE: 9500
BLOOD BANK UNIT TYPE AND RH: NORMAL
BLOOD GROUP ANTIBODIES SERPL: NEGATIVE
BPU ID: NORMAL
BPU ID: NORMAL
CANDIDA ALBICANS BY PCR: NOT DETECTED
CANDIDA AURIS BY PCR: NOT DETECTED
CANDIDA GLABRATA: NOT DETECTED
CANDIDA KRUSEI BY PCR: NOT DETECTED
CANDIDA PARAPSILOSIS BY PCR: NOT DETECTED
CANDIDA TROPICALIS BY PCR: NOT DETECTED
CROSSMATCH RESULT: NORMAL
CROSSMATCH RESULT: NORMAL
CRYPTOCOCCUS NEOFORMANS/GATTII BY PCR: NOT DETECTED
ENTEROBACTER CLOACAE COMPLEX BY PCR: NOT DETECTED
ENTEROBACTERALES BY PCR: NOT DETECTED
ENTEROCOCCUS FAECALIS BY PCR: NOT DETECTED
ENTEROCOCCUS FAECIUM BY PCR: NOT DETECTED
ESCHERICHIA COLI: NOT DETECTED
HAEMOPHILUS INFLUENZAE BY PCR: NOT DETECTED
KLEBSIELLA AEROGENES BY PCR: NOT DETECTED
KLEBSIELLA OXYTOCA BY PCR: NOT DETECTED
KLEBSIELLA PNEUMONIAE GROUP BY PCR: NOT DETECTED
LISTERIA MONOCYTOGENES BY PCR: NOT DETECTED
METHICILLIN RESISTANCE MECA/C  BY PCR: NOT DETECTED
NEISSERIA MENINGITIDIS BY PCR: NOT DETECTED
PROTEUS BY PCR: NOT DETECTED
PSEUDOMONAS AERUGINOSA, PSAEP: NOT DETECTED
RESISTANT GENE TARGETS: ABNORMAL
SALMONELLA SPECIES BY PCR: NOT DETECTED
SERRATIA MARCESCENS BY PCR: NOT DETECTED
SPECIMEN EXP DATE BLD: NORMAL
STAPHYLOCOCCUS AUREUS: NOT DETECTED
STAPHYLOCOCCUS EPIDERMIDIS BY PCR: DETECTED
STAPHYLOCOCCUS LUGDUNENSIS BY PCR: NOT DETECTED
STAPHYLOCOCCUS: DETECTED
STENOTROPHOMONAS MALTOPHILIA BY PCR: NOT DETECTED
STREPTOCOCCUS AGALACTIAE (GROUP B): NOT DETECTED
STREPTOCOCCUS PNEUMONIAE , SPNP: NOT DETECTED
STREPTOCOCCUS PYOGENES (GROUP A), SPYOP: NOT DETECTED
STREPTOCOCCUS: NOT DETECTED
TRANSFUSION STATUS PATIENT QL: NORMAL
TRANSFUSION STATUS PATIENT QL: NORMAL
UNIT DIVISION: 0
UNIT DIVISION: 0
UNIT ISSUE DATE/TIME: NORMAL
UNIT TAG COMMENT: NORMAL
UNIT TAG COMMENT: NORMAL

## 2024-03-20 NOTE — ED PROVIDER NOTES
Patient was signed out to me, with significant SBO, copious fecal matter emptying from NG tube, and significant bloody secretions in ETT. Pt was on an epi gtt, norepi gtt and vasopressin gtt; and BP was approx 50s systolic despite the high level of pressor requirements. Pt had coded once prior to my sign out, with a brief period of CPR and 1 amp of epi given at that time. A discussion was previously made with the family regarding the poor prognosis of the patient but the family wanted to continued all interventions by the time of my sign out. Pt then went into cardiopulmonary arrest again.     CPR was started by the ED team promptly and continued. High quality CPR was provided with adequate depth, rate, and frequency. Patient received from EMS ventilating via ETT. Airway managed by RT. Bilateral air entry noted. Access through peripheral IVs established and verified, parenteral fluids initiated. ACLS protocol initiated on presentation. CPR maintained throughout emergency department course between pulse checks.    - Presenting rhythm in ED: asystole  - Rhythm during pulse checks in ED: asystole  - During CPR, the patient monitored via monitors, ETCO2  - Number of shocks delivered: 1, for 1 episode of Vfib after several amps of Epi given  - Procedures done during resuscitation included: intubation  - Total CPR time per nursing leader charting  - ROSC not achieved. TOD 0044    With cardiac arrest of uncertain etiology, the list below shows the utilized medical decision making and differential (Hs & Ts):    - Hypovolemia: patient was given fluids through the established access.  - Hypoxia: Airway secured and ventilation and oxygenation were provided.  - Acidosis: based on chart review for medical problems that may suggest primary acidosis like DKA or ESRD, bicarb was given.  - Hyperkalemia: Dialysis access found to suggest potassium abnormality thus CaCl not given.  - Hypothermia:  Temperature was normal.  -  Hypoglycemia:  Glucose was normal.  - Cardiac tamponade: Clinical signs of effusion/tamponade were not present.  - Tension pneumothorax:  Bilateral breath sounds heard on auscultation while investigating for possibility of pneumothorax.  - ACS/M: given lack of suggestive history and vague, TPA is unlikely to improve outcomes and therefore was not administered.  - DVT/PE:  chart review, asymmetric LE swelling, and clinical signs are not found to suggest PE, TPA not administered.  - Trauma:  Significant external trauma was not present to suggest that as a cause of arrest.  - Toxins: Signs of self harm or findings suspicious of overdose were not present.    ACLS medications and therapies administered in the emergency department are listed below.    Family decided to make the patient DNR/DNI, comfort care after exhaustive resuscitative efforts, once pressors were stopped, the patient lost a pulse and TOD was called at 0044.      After the abovementioned interventions, ROSC was not achieved. Cardiac ultrasound was done and showed cardiac standstill with absence of cardiac activity, the patient was pronounced dead at 0044. Patient shows no signs of life, is pulseless, and has no respiratory effort. Will attempt to contact and inform next of kin or guardian.    Patient's family requesting autopsy for further investigation.     CRITICAL CARE NOTE :  12:21 AM    Critical care time is being documented due to the fulfillment of at least one of the following:    - Critical conditions: condition that acutely impairs one or more vital organ systems such that there is a high probability of imminent or life-threatening deterioration in condition. Examples are diagnoses including but not limited to Afib RVR, DKA, PE, Etc..    - Critical interventions: an action whose failure to initiate would likely allow a sudden, clinically significant decline in the patient's condition. These include  Requirement of transfer or ICU

## 2024-03-21 LAB
BACTERIA SPEC CULT: ABNORMAL
BACTERIA SPEC CULT: ABNORMAL
Lab: ABNORMAL

## 2024-03-24 LAB
BACTERIA SPEC CULT: NORMAL
Lab: NORMAL